# Patient Record
Sex: MALE | Race: WHITE | Employment: OTHER | URBAN - METROPOLITAN AREA
[De-identification: names, ages, dates, MRNs, and addresses within clinical notes are randomized per-mention and may not be internally consistent; named-entity substitution may affect disease eponyms.]

---

## 2017-01-15 DIAGNOSIS — E11.42 PERIPHERAL SENSORY NEUROPATHY DUE TO TYPE 2 DIABETES MELLITUS (HCC): ICD-10-CM

## 2017-01-17 RX ORDER — GABAPENTIN 300 MG/1
CAPSULE ORAL
Qty: 30 CAP | Refills: 0 | Status: SHIPPED | OUTPATIENT
Start: 2017-01-17 | End: 2017-02-14 | Stop reason: SDUPTHER

## 2017-01-24 ENCOUNTER — OFFICE VISIT (OUTPATIENT)
Dept: FAMILY MEDICINE CLINIC | Age: 82
End: 2017-01-24

## 2017-01-24 VITALS
TEMPERATURE: 97.6 F | RESPIRATION RATE: 14 BRPM | DIASTOLIC BLOOD PRESSURE: 61 MMHG | OXYGEN SATURATION: 94 % | HEIGHT: 71 IN | BODY MASS INDEX: 30.1 KG/M2 | WEIGHT: 215 LBS | HEART RATE: 81 BPM | SYSTOLIC BLOOD PRESSURE: 133 MMHG

## 2017-01-24 DIAGNOSIS — N18.30 CKD (CHRONIC KIDNEY DISEASE) STAGE 3, GFR 30-59 ML/MIN (HCC): ICD-10-CM

## 2017-01-24 DIAGNOSIS — Z79.899 ENCOUNTER FOR LONG-TERM (CURRENT) USE OF MEDICATIONS: ICD-10-CM

## 2017-01-24 DIAGNOSIS — E11.42 PERIPHERAL SENSORY NEUROPATHY DUE TO TYPE 2 DIABETES MELLITUS (HCC): ICD-10-CM

## 2017-01-24 RX ORDER — HYDROCODONE BITARTRATE AND ACETAMINOPHEN 5; 325 MG/1; MG/1
1 TABLET ORAL
Qty: 20 TAB | Refills: 0 | Status: SHIPPED | OUTPATIENT
Start: 2017-01-24 | End: 2017-03-22 | Stop reason: SDUPTHER

## 2017-01-24 RX ORDER — INSULIN LISPRO 100 [IU]/ML
INJECTION, SUSPENSION SUBCUTANEOUS
Qty: 10 PEN | Refills: 5 | Status: SHIPPED | OUTPATIENT
Start: 2017-01-24 | End: 2017-04-10 | Stop reason: SDUPTHER

## 2017-01-24 NOTE — PROGRESS NOTES
Chief Complaint   Patient presents with    Referral Follow Up     new diabetic doctor Dr. Yulissa Hinojosa

## 2017-01-24 NOTE — PROGRESS NOTES
Sarah Tobar is a 80 y.o. male, who's a pleasant gentleman. He still works as a night security,    HTN: BP at goal, continue as is. DM2: he haven't been f/u with Dr. Bradley Parsons. His A1C is 11 in September 2016. Very difficult to eat right, [de-identified] yo, unable to do much in ways of exercise. Have not done BG log for a while now. He denies hypoglycemic episode in the last few months, he can tell when his BG get low. He does get down to 130s-160s but this is usually first thing in the mornin after a night work. Currently 70/30 50u am and evening 45u. From limited info he gave me and base on his A1C I'll increase his morning 70/30 to 52. Will refer him to Dr. Mary Hyman instead, who's closer and easier for him to f/u. Diabetic neuropathy bilat feet. Will start gabapentin 300mg QHS. Chronic pain, norco 5/325mg #20 with a refill will last him 4 months. He only takes them seldomly and PRN for pain from his neuropathy. Chronic pedal edema have been prescribed bumex 2mg every day by previous doctor. Followed by Cardiology. Needed refill. Discussed option to use weight as a monitor. He's using his feet edema as indication to use bumex. Reviewed: active problem list, medication list, allergies, family history, social history, notes from last encounter, lab results    A comprehensive review of systems was negative except for that written in the HPI. No Known Allergies  Current Outpatient Prescriptions on File Prior to Visit   Medication Sig Dispense Refill    gabapentin (NEURONTIN) 300 mg capsule TAKE 1 CAPSULE BY MOUTH EVERY NIGHT 30 Cap 0    lisinopril (PRINIVIL, ZESTRIL) 20 mg tablet Take 1 Tab by mouth daily. 90 Tab 1    rosuvastatin (CRESTOR) 40 mg tablet Take 1 Tab by mouth nightly.  90 Tab 1    citalopram (CELEXA) 20 mg tablet TAKE 1 TABLET BY MOUTH EVERY MORNING AS NEEDED FOR ANXIETY DEPPRESSION 90 Tab 1    triamcinolone acetonide (KENALOG) 0.025 % topical cream       bumetanide (BUMEX) 2 mg tablet Take 1 Tab by mouth daily as needed. 90 Tab 1    omeprazole (PRILOSEC) 20 mg capsule TAKE ONE CAPSULE BY MOUTH EVERY DAY 90 Cap 5    Inulin-Sorbitol (FIBER) 2 gram chew Take 2 g by mouth daily. 90 Tab 3    desonide (DESOWEN) 0.05 % topical ointment Apply  to affected area two (2) times a day. 30 g 0    MYRBETRIQ 25 mg ER tablet       VESICARE 10 mg tablet       Blood-Glucose Meter monitoring kit Use as directed 1 Kit 0    albuterol (PROVENTIL HFA, VENTOLIN HFA) 90 mcg/actuation inhaler Take 2 puffs by inhalation every four (4) hours as needed for Wheezing. 1 Inhaler 0    albuterol (ACCUNEB) 1.25 mg/3 mL nebulizer solution Take 3 mL by inhalation every four (4) hours as needed for Wheezing (wheezing). 25 vial 0    tiotropium (SPIRIVA WITH HANDIHALER) 18 mcg inhalation capsule Take 1 Cap by inhalation daily.  glipiZIDE (GLUCOTROL) 5 mg tablet Take  by mouth two (2) times a day.  metolazone (ZAROXOLYN) 5 mg tablet Take 5 mg by mouth daily.  ezetimibe (ZETIA) 10 mg tablet Take 10 mg by mouth daily. No current facility-administered medications on file prior to visit. Patient Active Problem List   Diagnosis Code    Gynecomastia N62    IDDM (insulin dependent diabetes mellitus) ([facility] [address] E11.9, Z79.4    COPD (chronic obstructive pulmonary disease) ([facility]) J44.9    History of chronic CHF Z86.79    Constipation K59.00    CKD (chronic kidney disease) stage 3, GFR 30-59 ml/min N18.3    Uncontrolled type II diabetes mellitus ([facility]) E11.65    Essential hypertension with goal blood pressure less than 140/90 I10    Acute URI J06.9    Mild intermittent asthma with acute exacerbation J45.21       Visit Vitals    /61    Pulse 81    Temp 97.6 °F (36.4 °C) (Oral)    Resp 14    Ht 5' 11\" (1.803 m)    Wt 215 lb (97.5 kg)    SpO2 94%    BMI 29.99 kg/m2     General:  alert, cooperative, no distress, appears stated age, moderately obese.      Eyes:  conjunctivae/corneas clear. PERRL   Neck: supple, symmetrical, trachea midline, no carotid bruit and no JVD   Lung: clear to auscultation bilaterally   Heart:  regular rate and rhythm, S1, S2 normal, no murmur, click, rub or gallop   Abdomen: soft, non-tender. Extremities: extremities normal, atraumatic, no cyanosis or edema   Pulses: 2+ and symmetric   Neuro: normal without focal findings  mental status, speech normal, alert and oriented x iii       Assessment/Plans:    Yaneth Hutchins was seen today for referral follow up. Diagnoses and all orders for this visit:    IDDM (insulin dependent diabetes mellitus) ([facility] [address]  -     REFERRAL TO ENDOCRINOLOGY  -     Insulin Lisp & Lisp Prot, Hum, (HUMALOG 75-25 MIX) 100 unit/mL (75-25) inpn; 52 units in the morning, 45 units in the evening.  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 Encounter for long-term (current) use of medications  -     HEMOGLOBIN A1C W/O EAG  -     MICROALBUMIN, UR, RAND W/ MICROALBUMIN/CREA RATIO  -     METABOLIC PANEL, COMPREHENSIVE    Discussed plans, risk/benefits of treatments/observations. Through the use of shared decision making, above plans were agreed upon. Medication compliance advised. Patient verbalized understanding. Follow-up Disposition:  Return in about 4 months (around 5/24/2017) for chronic care.       Todd Maher MD  1/24/2017

## 2017-01-25 LAB
ALBUMIN SERPL-MCNC: 4 G/DL (ref 3.5–4.7)
ALBUMIN/CREAT UR: 543.6 MG/G CREAT (ref 0–30)
ALBUMIN/GLOB SERPL: 1.5 {RATIO} (ref 1.1–2.5)
ALP SERPL-CCNC: 100 IU/L (ref 39–117)
ALT SERPL-CCNC: 7 IU/L (ref 0–44)
AST SERPL-CCNC: 11 IU/L (ref 0–40)
BILIRUB SERPL-MCNC: 0.5 MG/DL (ref 0–1.2)
BUN SERPL-MCNC: 21 MG/DL (ref 8–27)
BUN/CREAT SERPL: 15 (ref 10–22)
CALCIUM SERPL-MCNC: 9.6 MG/DL (ref 8.6–10.2)
CHLORIDE SERPL-SCNC: 90 MMOL/L (ref 96–106)
CO2 SERPL-SCNC: 25 MMOL/L (ref 18–29)
CREAT SERPL-MCNC: 1.44 MG/DL (ref 0.76–1.27)
CREAT UR-MCNC: 69.5 MG/DL
GLOBULIN SER CALC-MCNC: 2.6 G/DL (ref 1.5–4.5)
GLUCOSE SERPL-MCNC: 403 MG/DL (ref 65–99)
HBA1C MFR BLD: 13.7 % (ref 4.8–5.6)
MICROALBUMIN UR-MCNC: 377.8 UG/ML
POTASSIUM SERPL-SCNC: 5.1 MMOL/L (ref 3.5–5.2)
PROT SERPL-MCNC: 6.6 G/DL (ref 6–8.5)
SODIUM SERPL-SCNC: 135 MMOL/L (ref 134–144)

## 2017-02-14 DIAGNOSIS — E11.42 PERIPHERAL SENSORY NEUROPATHY DUE TO TYPE 2 DIABETES MELLITUS (HCC): ICD-10-CM

## 2017-02-14 RX ORDER — GABAPENTIN 300 MG/1
CAPSULE ORAL
Qty: 90 CAP | Refills: 1 | Status: SHIPPED | OUTPATIENT
Start: 2017-02-14 | End: 2017-08-21 | Stop reason: SDUPTHER

## 2017-02-20 ENCOUNTER — OFFICE VISIT (OUTPATIENT)
Dept: ENDOCRINOLOGY | Age: 82
End: 2017-02-20

## 2017-02-20 VITALS
WEIGHT: 214.6 LBS | HEART RATE: 75 BPM | SYSTOLIC BLOOD PRESSURE: 122 MMHG | DIASTOLIC BLOOD PRESSURE: 68 MMHG | HEIGHT: 71 IN | BODY MASS INDEX: 30.04 KG/M2
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
## 2017-02-21 ENCOUNTER — TELEPHONE (OUTPATIENT)
Dept: FAMILY MEDICINE CLINIC | Age: 82
End: 2017-02-21

## 2017-02-21 RX ORDER — BLOOD SUGAR DIAGNOSTIC
STRIP MISCELLANEOUS
Qty: 150 STRIP | Refills: 10 | Status: SHIPPED | OUTPATIENT
Start: 2017-02-21 | End: 2020-01-01

## 2017-02-21 RX ORDER — INSULIN PUMP SYRINGE, 3 ML
EACH MISCELLANEOUS
Qty: 1 KIT | Refills: 0 | Status: SHIPPED | OUTPATIENT
Start: 2017-02-21 | End: 2020-01-01

## 2017-02-21 NOTE — TELEPHONE ENCOUNTER
----- Message from Dl Guerrero sent at 2/21/2017  1:09 PM EST -----  Regarding: Patient call  Patient is requesting a call back to discuss his meter. He can be reached at [phone]. Thank you.        Nimo Schaeffer

## 2017-02-21 NOTE — TELEPHONE ENCOUNTER
Called and spoke with patient. Patient states that he was seen yesterday 2/20/17 and was told that a new meter and strips would be sent to his pharmacy. Advised patient that Dr. Prosper Blanca sent over a meter and strips to his pharmacy this morning at 8:07 am. Patient verbalized understanding.

## 2017-02-23 RX ORDER — BLOOD-GLUCOSE METER
EACH MISCELLANEOUS
Qty: 1 EACH | Refills: 0 | Status: SHIPPED | OUTPATIENT
Start: 2017-02-23 | End: 2020-01-01

## 2017-03-16 DIAGNOSIS — R60.0 PEDAL EDEMA: ICD-10-CM

## 2017-03-16 RX ORDER — BUMETANIDE 2 MG/1
2 TABLET ORAL
Qty: 90 TAB | Refills: 1 | Status: SHIPPED | OUTPATIENT
Start: 2017-03-16 | End: 2017-09-22 | Stop reason: SDUPTHER

## 2017-03-22 DIAGNOSIS — E11.42 PERIPHERAL SENSORY NEUROPATHY DUE TO TYPE 2 DIABETES MELLITUS (HCC): ICD-10-CM

## 2017-03-22 NOTE — TELEPHONE ENCOUNTER
Pt would like a refill for:   HYDROcodone-acetaminophen (NORCO) 5-325 mg per tablet       Best number to reach him is [phone]

## 2017-03-23 ENCOUNTER — TELEPHONE (OUTPATIENT)
Dept: FAMILY MEDICINE CLINIC | Age: 82
End: 2017-03-23

## 2017-03-23 RX ORDER — HYDROCODONE BITARTRATE AND ACETAMINOPHEN 5; 325 MG/1; MG/1
1 TABLET ORAL
Qty: 20 TAB | Refills: 0 | Status: SHIPPED | OUTPATIENT
Start: 2017-03-23 | End: 2017-04-17 | Stop reason: SDUPTHER

## 2017-03-23 NOTE — TELEPHONE ENCOUNTER
Called pt to tell him his norco is ready for pickup. Ask him to call me back if he wants it faxed to pharmacy.

## 2017-04-10 ENCOUNTER — HOSPITAL ENCOUNTER (EMERGENCY)
Age: 82
Discharge: HOME | End: 2017-04-10
Attending: EMERGENCY MEDICINE | Admitting: EMERGENCY MEDICINE
Payer: MEDICARE

## 2017-04-10 VITALS
HEART RATE: 100 BPM | SYSTOLIC BLOOD PRESSURE: 163 MMHG | HEIGHT: 71 IN | WEIGHT: 228.18 LBS | OXYGEN SATURATION: 98 % | DIASTOLIC BLOOD PRESSURE: 71 MMHG | BODY MASS INDEX: 31.94 KG/M2 | RESPIRATION RATE: 16 BRPM | TEMPERATURE: 97.4 F

## 2017-04-10 DIAGNOSIS — I10 ESSENTIAL HYPERTENSION: ICD-10-CM

## 2017-04-10 DIAGNOSIS — R04.0 RIGHT-SIDED EPISTAXIS: Primary | ICD-10-CM

## 2017-04-10 LAB
BASOPHILS # BLD AUTO: 0 K/UL (ref 0–0.1)
BASOPHILS # BLD: 0 % (ref 0–1)
EOSINOPHIL # BLD: 0.2 K/UL (ref 0–0.4)
EOSINOPHIL NFR BLD: 3 % (ref 0–7)
ERYTHROCYTE [DISTWIDTH] IN BLOOD BY AUTOMATED COUNT: 13.2 % (ref 11.5–14.5)
HCT VFR BLD AUTO: 42.6 % (ref 36.6–50.3)
HGB BLD-MCNC: 14.3 G/DL (ref 12.1–17)
INR PPP: 0.9 (ref 0.9–1.1)
LYMPHOCYTES # BLD AUTO: 18 % (ref 12–49)
LYMPHOCYTES # BLD: 1.2 K/UL (ref 0.8–3.5)
MCH RBC QN AUTO: 31.1 PG (ref 26–34)
MCHC RBC AUTO-ENTMCNC: 33.6 G/DL (ref 30–36.5)
MCV RBC AUTO: 92.6 FL (ref 80–99)
MONOCYTES # BLD: 0.7 K/UL (ref 0–1)
MONOCYTES NFR BLD AUTO: 11 % (ref 5–13)
NEUTS SEG # BLD: 4.5 K/UL (ref 1.8–8)
NEUTS SEG NFR BLD AUTO: 68 % (ref 32–75)
PLATELET # BLD AUTO: 187 K/UL (ref 150–400)
PROTHROMBIN TIME: 9.4 SEC (ref 9–11.1)
RBC # BLD AUTO: 4.6 M/UL (ref 4.1–5.7)
WBC # BLD AUTO: 6.5 K/UL (ref 4.1–11.1)

## 2017-04-10 PROCEDURE — 77030011647 HC PK NSL MEROCL MEDT -A

## 2017-04-10 PROCEDURE — 85610 PROTHROMBIN TIME: CPT | Performed by: EMERGENCY MEDICINE

## 2017-04-10 PROCEDURE — 36415 COLL VENOUS BLD VENIPUNCTURE: CPT | Performed by: EMERGENCY MEDICINE

## 2017-04-10 PROCEDURE — 75810000284 HC CNTRL NASAL HEMORHRAGE SIMPLE

## 2017-04-10 PROCEDURE — 99282 EMERGENCY DEPT VISIT SF MDM: CPT

## 2017-04-10 PROCEDURE — 85025 COMPLETE CBC W/AUTO DIFF WBC: CPT | Performed by: EMERGENCY MEDICINE

## 2017-04-10 PROCEDURE — 74011250637 HC RX REV CODE- 250/637: Performed by: EMERGENCY MEDICINE

## 2017-04-10 RX ORDER — INSULIN LISPRO 100 [IU]/ML
INJECTION, SUSPENSION SUBCUTANEOUS
Qty: 10 PEN | Refills: 5 | Status: SHIPPED | OUTPATIENT
Start: 2017-04-10 | End: 2017-08-02 | Stop reason: SDUPTHER

## 2017-04-10 RX ORDER — OXYMETAZOLINE HCL 0.05 %
2 SPRAY, NON-AEROSOL (ML) NASAL
Status: COMPLETED | OUTPATIENT
Start: 2017-04-10 | End: 2017-04-10

## 2017-04-10 RX ADMIN — OXYMETAZOLINE HYDROCHLORIDE 2 SPRAY: 5 SPRAY NASAL at 07:46
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
## 2017-04-10 NOTE — DISCHARGE INSTRUCTIONS
KEEP NOSE PACKING IN PLACE. GO TO YOUR PLANNED APPOINTMENT WITH DR. Gita Barboza ON Thursday April 13TH AT 10AM. PLEASE ARRIVE AT 9:45AM.    RETURN FOR RECURRENT BLEEDING, FEVER, DIFFICULTY BREATHING OR OTHER NEW CONCERNING SYMPTOMS. FOLLOW-UP WITH YOUR REGULAR DOCTOR ABOUT YOUR ELEVATED BLOOD PRESSURE. High Blood Pressure: Care Instructions  Your Care Instructions  If your blood pressure is usually above 140/90, you have high blood pressure, or hypertension. That means the top number is 140 or higher or the bottom number is 90 or higher, or both. Despite what a lot of people think, high blood pressure usually doesn't cause headaches or make you feel dizzy or lightheaded. It usually has no symptoms. But it does increase your risk for heart attack, stroke, and kidney or eye damage. The higher your blood pressure, the more your risk increases. Your doctor will give you a goal for your blood pressure. Your goal will be based on your health and your age. An example of a goal is to keep your blood pressure below 140/90. Lifestyle changes, such as eating healthy and being active, are always important to help lower blood pressure. You might also take medicine to reach your blood pressure goal.  Follow-up care is a key part of your treatment and safety. Be sure to make and go to all appointments, and call your doctor if you are having problems. It's also a good idea to know your test results and keep a list of the medicines you take. How can you care for yourself at home? Medical treatment  · If you stop taking your medicine, your blood pressure will go back up. You may take one or more types of medicine to lower your blood pressure. Be safe with medicines. Take your medicine exactly as prescribed. Call your doctor if you think you are having a problem with your medicine. · Talk to your doctor before you start taking aspirin every day.  Aspirin can help certain people lower their risk of a heart attack or stroke. But taking aspirin isn't right for everyone, because it can cause serious bleeding. · See your doctor regularly. You may need to see the doctor more often at first or until your blood pressure comes down. · If you are taking blood pressure medicine, talk to your doctor before you take decongestants or anti-inflammatory medicine, such as ibuprofen. Some of these medicines can raise blood pressure. · Learn how to check your blood pressure at home. Lifestyle changes  · Stay at a healthy weight. This is especially important if you put on weight around the waist. Losing even 10 pounds can help you lower your blood pressure. · If your doctor recommends it, get more exercise. Walking is a good choice. Bit by bit, increase the amount you walk every day. Try for at least 30 minutes on most days of the week. You also may want to swim, bike, or do other activities. · Avoid or limit alcohol. Talk to your doctor about whether you can drink any alcohol. · Try to limit how much sodium you eat to less than 2,300 milligrams (mg) a day. Your doctor may ask you to try to eat less than 1,500 mg a day. · Eat plenty of fruits (such as bananas and oranges), vegetables, legumes, whole grains, and low-fat dairy products. · Lower the amount of saturated fat in your diet. Saturated fat is found in animal products such as milk, cheese, and meat. Limiting these foods may help you lose weight and also lower your risk for heart disease. · Do not smoke. Smoking increases your risk for heart attack and stroke. If you need help quitting, talk to your doctor about stop-smoking programs and medicines. These can increase your chances of quitting for good. When should you call for help? Call 911 anytime you think you may need emergency care. This may mean having symptoms that suggest that your blood pressure is causing a serious heart or blood vessel problem. Your blood pressure may be over 180/110.   For example, call 911 if:  · You have symptoms of a heart attack. These may include:  ¨ Chest pain or pressure, or a strange feeling in the chest.  ¨ Sweating. ¨ Shortness of breath. ¨ Nausea or vomiting. ¨ Pain, pressure, or a strange feeling in the back, neck, jaw, or upper belly or in one or both shoulders or arms. ¨ Lightheadedness or sudden weakness. ¨ A fast or irregular heartbeat. · You have symptoms of a stroke. These may include:  ¨ Sudden numbness, tingling, weakness, or loss of movement in your face, arm, or leg, especially on only one side of your body. ¨ Sudden vision changes. ¨ Sudden trouble speaking. ¨ Sudden confusion or trouble understanding simple statements. ¨ Sudden problems with walking or balance. ¨ A sudden, severe headache that is different from past headaches. · You have severe back or belly pain. Do not wait until your blood pressure comes down on its own. Get help right away. Call your doctor now or seek immediate care if:  · Your blood pressure is much higher than normal (such as 180/110 or higher), but you don't have symptoms. · You think high blood pressure is causing symptoms, such as:  ¨ Severe headache. ¨ Blurry vision. Watch closely for changes in your health, and be sure to contact your doctor if:  · Your blood pressure measures 140/90 or higher at least 2 times. That means the top number is 140 or higher or the bottom number is 90 or higher, or both. · You think you may be having side effects from your blood pressure medicine. · Your blood pressure is usually normal, but it goes above normal at least 2 times. Where can you learn more? Go to http://agnes-ca.info/. Enter J012 in the search box to learn more about \"High Blood Pressure: Care Instructions. \"  Current as of: August 8, 2016  Content Version: 11.2  © 9980-6131 [employer].  Care instructions adapted under license by [employer] (which disclaims liability or warranty for this information). If you have questions about a medical condition or this instruction, always ask your healthcare professional. [address] any warranty or liability for your use of this information. Nosebleeds: Care Instructions  Your Care Instructions    Nosebleeds are common, especially if you have colds or allergies. Many things can cause a nosebleed. Some nosebleeds stop on their own with pressure. Others need packing. Some get cauterized (sealed). If you have gauze or other packing materials in your nose, you will need to follow up with your doctor to have the packing removed. You may need more treatment if you get nosebleeds a lot. The doctor has checked you carefully, but problems can develop later. If you notice any problems or new symptoms, get medical treatment right away. Follow-up care is a key part of your treatment and safety. Be sure to make and go to all appointments, and call your doctor if you are having problems. It's also a good idea to know your test results and keep a list of the medicines you take. How can you care for yourself at home? · If you get another nosebleed:  ¨ Sit up and tilt your head slightly forward. This keeps blood from going down your throat. ¨ Use your thumb and index finger to pinch your nose shut for 10 minutes. Use a clock. Do not check to see if the bleeding has stopped before the 10 minutes are up. If the bleeding has not stopped, pinch your nose shut for another 10 minutes. ¨ When the bleeding has stopped, try not to pick, rub, or blow your nose for 12 hours. Avoiding these things helps keep your nose from bleeding again. · If your doctor prescribed antibiotics, take them as directed. Do not stop taking them just because you feel better. You need to take the full course of antibiotics. To prevent nosebleeds  · Do not blow your nose too hard. · Try not to lift or strain after a nosebleed.   · Raise your head on a pillow while you sleep.  · Put a thin layer of a saline- or water-based nasal gel, such as NasoGel, inside your nose. Put it on the septum, which divides your nostrils. This will prevent dryness that can cause nosebleeds. · Use a vaporizer or humidifier to add moisture to your bedroom. Follow the directions for cleaning the machine. · Do not use aspirin, ibuprofen (Advil, Motrin), or naproxen (Aleve) for 36 to 48 hours after a nosebleed unless your doctor tells you to. You can use acetaminophen (Tylenol) for pain relief. · Talk to your doctor about stopping any other medicines you are taking. Some medicines may make you more likely to get a nosebleed. · Do not use cold medicines or nasal sprays without first talking to your doctor. They can make your nose dry. When should you call for help? Call 911 anytime you think you may need emergency care. For example, call if:  · You passed out (lost consciousness). Call your doctor now or seek immediate medical care if:  · You get another nosebleed and your nose is still bleeding after you have applied pressure 3 times for 10 minutes each time (30 minutes total). · There is a lot of blood running down the back of your throat even after you pinch your nose and tilt your head forward. · You have a fever. · You have sinus pain. Watch closely for changes in your health, and be sure to contact your doctor if:  · You get nosebleeds often, even if they stop. · You do not get better as expected. Where can you learn more? Go to http://agnes-ca.info/. Enter S156 in the search box to learn more about \"Nosebleeds: Care Instructions. \"  Current as of: May 27, 2016  Content Version: 11.2  © 4031-5001 [employer]. Care instructions adapted under license by American Board of Addiction Medicine (ABAM) (which disclaims liability or warranty for this information).  If you have questions about a medical condition or this instruction, always ask your healthcare professional. [employer] disclaims any warranty or liability for your use of this information.

## 2017-04-10 NOTE — ED NOTES
Dr Mila Sandra bedside evaluating pt. ENT cart set up bedside along with nasal packing tray. Pt complains of nose bleed x 1.5 hours Pt reports hx of nose bleeds as child Pt states he noted headache x this am and reported feeling his BP rise. Pt denies any other complaints. Pt alert and oriented x 4 Skin warm dry intact.

## 2017-04-10 NOTE — ED NOTES
Pt discharged by Dr Na Jon. Pt provided with discharge instructions Rx and instructions on follow up care. Pt out of ED under own power with steady gait accompanied by self.

## 2017-04-10 NOTE — TELEPHONE ENCOUNTER
Patient called and asked for a prescription be called into his pharmacy please for [facility]. Patient can be reached at:  [phone].     Gina   [phone]  [facility]

## 2017-04-17 ENCOUNTER — OFFICE VISIT (OUTPATIENT)
Dept: ENDOCRINOLOGY | Age: 82
End: 2017-04-17

## 2017-04-17 ENCOUNTER — OFFICE VISIT (OUTPATIENT)
Dept: FAMILY MEDICINE CLINIC | Age: 82
End: 2017-04-17

## 2017-04-17 VITALS
TEMPERATURE: 97.7 F | DIASTOLIC BLOOD PRESSURE: 69 MMHG | SYSTOLIC BLOOD PRESSURE: 153 MMHG | OXYGEN SATURATION: 94 % | HEART RATE: 88 BPM | HEIGHT: 71 IN | BODY MASS INDEX: 31.22 KG/M2 | RESPIRATION RATE: 16 BRPM | WEIGHT: 223 LBS

## 2017-04-17 DIAGNOSIS — R04.0 BLEEDING NOSE: Primary | ICD-10-CM

## 2017-04-17 DIAGNOSIS — N17.9 AKI (ACUTE KIDNEY INJURY) (HCC): ICD-10-CM

## 2017-04-17 DIAGNOSIS — E11.42 PERIPHERAL SENSORY NEUROPATHY DUE TO TYPE 2 DIABETES MELLITUS (HCC): ICD-10-CM

## 2017-04-17 RX ORDER — HYDROCODONE BITARTRATE AND ACETAMINOPHEN 5; 325 MG/1; MG/1
1 TABLET ORAL
Qty: 20 TAB | Refills: 0 | Status: SHIPPED | OUTPATIENT
Start: 2017-04-17 | End: 2017-05-25 | Stop reason: SDUPTHER

## 2017-04-17 NOTE — MR AVS SNAPSHOT
Visit Information Date & Time Provider Department Dept. Phone Encounter #  
 4/17/2017 12:00 PM Baudilio Price MD [facility] at [address] [id number] Follow-up Instructions Return in about 3 months (around 7/17/2017) for annual exam.  
  
Your Appointments 5/1/2017  8:50 AM  
Follow Up with Scooter Koenig MD  
[facility] [address] [facility]) Appt Note: f/u           dm       morgan from        4/17/2017  
 [address] [facility] [address] [phone] [address]  
  
    
 5/25/2017  8:45 AM  
ROUTINE CARE with Baudilio Price MD  
[facility] at [facility] [address]) Appt Note: 4m fu; 4m fu  
 [facility] [address] [phone]  
[facility] Upcoming Health Maintenance Date Due  
 FOOT EXAM Q1 9/20/1945 EYE EXAM RETINAL OR DILATED Q1 9/20/1945 DTaP/Tdap/Td series (1 - Tdap) 9/20/1956 ZOSTER VACCINE AGE 60> 9/20/1995 MEDICARE YEARLY EXAM 9/20/2000 LIPID PANEL Q1 5/13/2017 HEMOGLOBIN A1C Q6M 7/24/2017 MICROALBUMIN Q1 1/24/2018 GLAUCOMA SCREENING Q2Y 3/16/2018 Allergies as of 4/17/2017  Review Complete On: 4/17/2017 By: Baudilio Price MD  
 No Known Allergies Current Immunizations  Reviewed on 5/4/2016 Name Date Influenza High Dose Vaccine PF 9/14/2016 Influenza Vaccine Whole 9/28/2010 Pneumococcal Vaccine (Unspecified Type) 11/28/2010  9:00 PM  
  
 Not reviewed this visit You Were Diagnosed With   
  
 Codes Comments Bleeding nose    -  Primary ICD-10-CM: R04.0 ICD-9-CM: 500. 7 Vitals BP Pulse Temp Resp Height(growth percentile) Weight(growth percentile) 153/69 88 97.7 °F (36.5 °C) (Oral) 16 5' 11\" (1.803 m) 223 lb (101.2 kg) SpO2 BMI Smoking Status 94% 31.1 kg/m2 Former Smoker Vitals History BMI and BSA Data Body Mass Index Body Surface Area  
 31.1 kg/m 2 2.25 m 2 Preferred Pharmacy Pharmacy Name Phone [facility] [address] [place] [address], [address] [place] [address] Drive [phone] Your Updated Medication List  
  
   
This list is accurate as of: 4/17/17 12:45 PM.  Always use your most recent med list.  
  
  
  
  
 * albuterol 90 mcg/actuation inhaler Commonly known as:  PROVENTIL HFA, VENTOLIN HFA, PROAIR HFA Take 2 puffs by inhalation every four (4) hours as needed for Wheezing. * albuterol 1.25 mg/3 mL Nebu Commonly known as:  Randolph Levy Take 3 mL by inhalation every four (4) hours as needed for Wheezing (wheezing). * Ascensia CONTOUR strip Generic drug:  glucose blood VI test strips MONITOR BLOOD SUGAR TWICE DAILY * glucose blood VI test strips strip Commonly known as:  ACCU-CHEK AGATHA Monitor blood sugar twice daily. ICD 10 Code: E11.65 * Blood-Glucose Meter monitoring kit Commonly known as:  CONTOUR METER Monitor BS twice daily * Blood-Glucose Meter Misc Commonly known as:  ACCU-CHEK AGATHA PLUS METER Monitor blood sugar twice daily. ICD 10 Code: E11.65  
  
 bumetanide 2 mg tablet Commonly known as:  Monique [place] Take 1 Tab by mouth daily as needed. desonide 0.05 % topical ointment Commonly known as:  Sergio Canon Apply  to affected area two (2) times a day.  
  
 gabapentin 300 mg capsule Commonly known as:  NEURONTIN  
TAKE 1 CAPSULE BY MOUTH EVERY NIGHT  
  
 glipiZIDE 5 mg tablet Commonly known as:  Janine  Take  by mouth two (2) times a day. HYDROcodone-acetaminophen 5-325 mg per tablet Commonly known as:  Yenny Rook Take 1 Tab by mouth daily as needed for Pain. Insulin Lisp & Lisp Prot (Hum) 100 unit/mL (75-25) Inpn Commonly known as:  HUMALOG 75-25 MIX 52 units in the morning, 45 units in the evening. Inulin-Sorbitol 2 gram Chew Commonly known as:  Fiber Take 2 g by mouth daily. lisinopril 20 mg tablet Commonly known as:  Barbarann Plunk Take 1 Tab by mouth daily. metOLazone 5 mg tablet Commonly known as:  Loetta Epp Take 5 mg by mouth daily. MYRBETRIQ 25 mg ER tablet Generic drug:  mirabegron ER Take 25 mg by mouth daily. omeprazole 20 mg capsule Commonly known as:  PRILOSEC  
TAKE ONE CAPSULE BY MOUTH EVERY DAY  
  
 rosuvastatin 40 mg tablet Commonly known as:  CRESTOR Take 1 Tab by mouth nightly. SPIRIVA WITH HANDIHALER 18 mcg inhalation capsule Generic drug:  tiotropium Take 1 Cap by inhalation daily. triamcinolone acetonide 0.025 % topical cream  
Commonly known as:  KENALOG  
  
 VESIcare 10 mg tablet Generic drug:  solifenacin Take 10 mg by mouth daily. ZETIA 10 mg tablet Generic drug:  ezetimibe Take 10 mg by mouth daily. * Notice: This list has 6 medication(s) that are the same as other medications prescribed for you. Read the directions carefully, and ask your doctor or other care provider to review them with you. Follow-up Instructions Return in about 3 months (around 7/17/2017) for annual exam.  
  
  
Introducing [facility]! [facility] introduces [facility] patient portal. Now you can access parts of your medical record, email your doctor's office, and request medication refills online. 1. In your internet browser, go to [email or web address] 2. Click on the First Time User? Click Here link in the Sign In box. You will see the New Member Sign Up page. 3. Enter your [facility] Access Code exactly as it appears below. You will not need to use this code after youve completed the sign-up process. If you do not sign up before the expiration date, you must request a new code. · [facility] Access Code: [email or web address] Expires: 4/24/2017  3:29 PM 
 
4. Enter the last four digits of your Social Security Number (xxxx) and Date of Birth (mm/dd/yyyy) as indicated and click Submit. You will be taken to the next sign-up page. 5. Create a Coub ID. This will be your Coub login ID and cannot be changed, so think of one that is secure and easy to remember. 6. Create a Coub password. You can change your password at any time. 7. Enter your Password Reset Question and Answer. This can be used at a later time if you forget your password. 8. Enter your e-mail address. You will receive e-mail notification when new information is available in [address] 9. Click Sign Up. You can now view and download portions of your medical record. 10. Click the Download Summary menu link to download a portable copy of your medical information. If you have questions, please visit the Frequently Asked Questions section of the Coub website. Remember, [facility]b is NOT to be used for urgent needs. For medical emergencies, dial 911. Now available from your iPhone and Android! Please provide this summary of care documentation to your next provider. Your primary care clinician is listed as Morales Osborne. If you have any questions after today's visit, please call [phone].

## 2017-04-17 NOTE — PROGRESS NOTES
Ketty Blanc is a 80 y.o. male, who's a pleasant gentleman. He still works as a night security,    [employer]. Last week had nosebleed \"alot\". It was packed at ED. There [employer], will recheck today. Hx of CKD stage 3B. HTN: BP at goal, continue as is. DM2: he now sees Dr. Elizabeth Frey, was there this morning, but Dr. Elizabeth Frey came in office late and he couldn't wait. He report 3 episodes of hypoglycemic b/c he misses meal when come home from night shift. I advice to try not miss meal or drink his wife's ensure or it's OK to cut down his 70/30 by half the morning before going to bed. Chronic pain and Diabetic neuropathy bilat feet, norco 5/325mg #20 with a refill will last him 4 months. He only takes them seldomly and PRN for pain from his neuropathy. Reviewed: active problem list, medication list, allergies, family history, social history, notes from last encounter, lab results    A comprehensive review of systems was negative except for that written in the HPI. No Known Allergies  Current Outpatient Prescriptions on File Prior to Visit   Medication Sig Dispense Refill    Insulin Lisp & Lisp Prot, Hum, (HUMALOG 75-25 MIX) 100 unit/mL (75-25) inpn 52 units in the morning, 45 units in the evening. 10 Pen 5    Blood-Glucose Meter (ACCU-CHEK AGATHA PLUS METER) misc Monitor blood sugar twice daily. ICD 10 Code: E11.65 1 Each 0    glucose blood VI test strips (ACCU-CHEK AGATHA) strip Monitor blood sugar twice daily. ICD 10 Code: E11.65 200 Strip 3    Blood-Glucose Meter (CONTOUR METER) monitoring kit Monitor BS twice daily 1 Kit 0    ASCENSIA CONTOUR strip MONITOR BLOOD SUGAR TWICE DAILY 150 Strip 10    gabapentin (NEURONTIN) 300 mg capsule TAKE 1 CAPSULE BY MOUTH EVERY NIGHT 90 Cap 1    lisinopril (PRINIVIL, ZESTRIL) 20 mg tablet Take 1 Tab by mouth daily. 90 Tab 1    rosuvastatin (CRESTOR) 40 mg tablet Take 1 Tab by mouth nightly.  90 Tab 1    triamcinolone acetonide (KENALOG) 0.025 % topical cream       omeprazole (PRILOSEC) 20 mg capsule TAKE ONE CAPSULE BY MOUTH EVERY DAY 90 Cap 5    VESICARE 10 mg tablet Take 10 mg by mouth daily.  albuterol (PROVENTIL HFA, VENTOLIN HFA) 90 mcg/actuation inhaler Take 2 puffs by inhalation every four (4) hours as needed for Wheezing. 1 Inhaler 0    albuterol (ACCUNEB) 1.25 mg/3 mL nebulizer solution Take 3 mL by inhalation every four (4) hours as needed for Wheezing (wheezing). 25 vial 0    glipiZIDE (GLUCOTROL) 5 mg tablet Take  by mouth two (2) times a day.  metolazone (ZAROXOLYN) 5 mg tablet Take 5 mg by mouth daily.  ezetimibe (ZETIA) 10 mg tablet Take 10 mg by mouth daily.  bumetanide (BUMEX) 2 mg tablet Take 1 Tab by mouth daily as needed. 90 Tab 1    Inulin-Sorbitol (FIBER) 2 gram chew Take 2 g by mouth daily. 90 Tab 3    desonide (DESOWEN) 0.05 % topical ointment Apply  to affected area two (2) times a day. 30 g 0    MYRBETRIQ 25 mg ER tablet Take 25 mg by mouth daily.  tiotropium (SPIRIVA WITH HANDIHALER) 18 mcg inhalation capsule Take 1 Cap by inhalation daily. No current facility-administered medications on file prior to visit. Patient Active Problem List   Diagnosis Code    Gynecomastia N62    IDDM (insulin dependent diabetes mellitus) ([facility] [address] E11.9, Z79.4    COPD (chronic obstructive pulmonary disease) ([facility]) J44.9    History of chronic CHF Z86.79    Constipation K59.00    CKD (chronic kidney disease) stage 3, GFR 30-59 ml/min N18.3    Uncontrolled type II diabetes mellitus ([facility]) E11.65    Essential hypertension with goal blood pressure less than 140/90 I10    Acute URI J06.9    Mild intermittent asthma with acute exacerbation J45.21       Visit Vitals    /69    Pulse 88    Temp 97.7 °F (36.5 °C) (Oral)    Resp 16    Ht 5' 11\" (1.803 m)    Wt 223 lb (101.2 kg)    SpO2 94%    BMI 31.1 kg/m2     General:  alert, cooperative, no distress, appears stated age, moderately obese.      Eyes: conjunctivae/corneas clear. PERRL   Neck: supple, symmetrical, trachea midline, no carotid bruit and no JVD   Lung: clear to auscultation bilaterally   Heart:  regular rate and rhythm, S1, S2 normal, no murmur, click, rub or gallop   Abdomen: soft, non-tender. Extremities: extremities normal, atraumatic, no cyanosis or edema   Pulses: 2+ and symmetric   Neuro: normal without focal findings  mental status, speech normal, alert and oriented x iii       Assessment/Plans:    Zayra Mello was seen today for bleeding/bruising. 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 Discussed plans, risk/benefits of treatments/observations. Through the use of shared decision making, above plans were agreed upon. Medication compliance advised. Patient verbalized understanding.      Follow-up Disposition:  Return in about 3 months (around 7/17/2017) for annual exam.      Pebbles Horton MD  4/17/2017

## 2017-04-17 NOTE — PROGRESS NOTES
Chief Complaint   Patient presents with    Bleeding/Bruising     nose bleeding went to ED at [facility] packed nose BP elevated   wants refill of norco.

## 2017-04-18 LAB
BUN SERPL-MCNC: 25 MG/DL (ref 8–27)
BUN/CREAT SERPL: 18 (ref 10–24)
CALCIUM SERPL-MCNC: 9 MG/DL (ref 8.6–10.2)
CHLORIDE SERPL-SCNC: 93 MMOL/L (ref 96–106)
CO2 SERPL-SCNC: 28 MMOL/L (ref 18–29)
CREAT SERPL-MCNC: 1.38 MG/DL (ref 0.76–1.27)
GLUCOSE SERPL-MCNC: 375 MG/DL (ref 65–99)
INTERPRETATION: NORMAL
POTASSIUM SERPL-SCNC: 4.7 MMOL/L (ref 3.5–5.2)
SODIUM SERPL-SCNC: 136 MMOL/L (ref 134–144)

## 2017-05-03 ENCOUNTER — TELEPHONE (OUTPATIENT)
Dept: FAMILY MEDICINE CLINIC | Age: 82
End: 2017-05-03

## 2017-05-25 ENCOUNTER — OFFICE VISIT (OUTPATIENT)
Dept: FAMILY MEDICINE CLINIC | Age: 82
End: 2017-05-25

## 2017-05-25 VITALS
RESPIRATION RATE: 18 BRPM | TEMPERATURE: 95.6 F | SYSTOLIC BLOOD PRESSURE: 148 MMHG | HEIGHT: 71 IN | BODY MASS INDEX: 30.3 KG/M2 | WEIGHT: 216.4 LBS | OXYGEN SATURATION: 94 % | HEART RATE: 92 BPM | DIASTOLIC BLOOD PRESSURE: 88 MMHG

## 2017-05-25 DIAGNOSIS — Z12.5 PROSTATE CANCER SCREENING: ICD-10-CM

## 2017-05-25 DIAGNOSIS — Z79.899 ENCOUNTER FOR LONG-TERM (CURRENT) USE OF MEDICATIONS: ICD-10-CM

## 2017-05-25 DIAGNOSIS — J41.8 MIXED SIMPLE AND MUCOPURULENT CHRONIC BRONCHITIS (HCC): ICD-10-CM

## 2017-05-25 DIAGNOSIS — E55.9 VITAMIN D DEFICIENCY: ICD-10-CM

## 2017-05-25 DIAGNOSIS — Z12.11 COLON CANCER SCREENING: ICD-10-CM

## 2017-05-25 DIAGNOSIS — Z71.89 ACP (ADVANCE CARE PLANNING): ICD-10-CM

## 2017-05-25 DIAGNOSIS — N18.30 CKD (CHRONIC KIDNEY DISEASE) STAGE 3, GFR 30-59 ML/MIN (HCC): ICD-10-CM

## 2017-05-25 DIAGNOSIS — Z79.899 ENCOUNTER FOR LONG-TERM (CURRENT) USE OF OTHER MEDICATIONS: ICD-10-CM

## 2017-05-25 DIAGNOSIS — N40.1 BENIGN PROSTATIC HYPERPLASIA WITH WEAK URINARY STREAM: ICD-10-CM

## 2017-05-25 DIAGNOSIS — R39.12 BENIGN PROSTATIC HYPERPLASIA WITH WEAK URINARY STREAM: ICD-10-CM

## 2017-05-25 DIAGNOSIS — E78.5 HYPERLIPIDEMIA, UNSPECIFIED HYPERLIPIDEMIA TYPE: ICD-10-CM

## 2017-05-25 DIAGNOSIS — Z00.00 MEDICARE ANNUAL WELLNESS VISIT, SUBSEQUENT: Primary | ICD-10-CM

## 2017-05-25 DIAGNOSIS — I10 ESSENTIAL HYPERTENSION WITH GOAL BLOOD PRESSURE LESS THAN 140/90: ICD-10-CM

## 2017-05-25 DIAGNOSIS — E11.42 PERIPHERAL SENSORY NEUROPATHY DUE TO TYPE 2 DIABETES MELLITUS (HCC): ICD-10-CM

## 2017-05-25 RX ORDER — GUAIFENESIN 100 MG/5ML
81 LIQUID (ML) ORAL DAILY
COMMUNITY
End: 2019-01-01 | Stop reason: CLARIF

## 2017-05-25 RX ORDER — HYDROCODONE BITARTRATE AND ACETAMINOPHEN 5; 325 MG/1; MG/1
1 TABLET ORAL
Qty: 20 TAB | Refills: 0 | Status: SHIPPED | OUTPATIENT
Start: 2017-05-25 | End: 2017-06-26 | Stop reason: SDUPTHER

## 2017-05-25 NOTE — MR AVS SNAPSHOT
Visit Information Date & Time Provider Department Dept. Phone Encounter #  
 5/25/2017  8:45 AM Zia Carter MD [facility] at [address] [id number] Follow-up Instructions Return in about 4 months (around 9/25/2017) for chronic care. Your Appointments 7/18/2017 10:15 AM  
COMPLETE PHYSICAL with Zia Carter MD  
[facility] at [facility]) Appt Note: [phone]  
[facility] Upcoming Health Maintenance Date Due  
 FOOT EXAM Q1 9/20/1945 EYE EXAM RETINAL OR DILATED Q1 9/20/1945 DTaP/Tdap/Td series (1 - Tdap) 9/20/1956 ZOSTER VACCINE AGE 60> 9/20/1995 HEMOGLOBIN A1C Q6M 7/24/2017 INFLUENZA AGE 9 TO ADULT 8/1/2017 MICROALBUMIN Q1 1/24/2018 GLAUCOMA SCREENING Q2Y 3/16/2018 LIPID PANEL Q1 5/25/2018 MEDICARE YEARLY EXAM 5/26/2018 Allergies as of 5/25/2017  Review Complete On: 5/25/2017 By: Zia Carter MD  
 No Known Allergies Current Immunizations  Reviewed on 5/4/2016 Name Date Influenza High Dose Vaccine PF 9/14/2016 Influenza Vaccine Whole 9/28/2010 Pneumococcal Vaccine (Unspecified Type) 11/28/2010  9:00 PM  
  
 Not reviewed this visit You Were Diagnosed With   
  
 Codes Comments Medicare annual wellness visit, subsequent    -  Primary ICD-10-CM: Z00.00 ICD-9-CM: V70.0 Uncontrolled type 2 diabetes mellitus with diabetic nephropathy, with long-term current use of insulin (HCC)     ICD-10-CM: E11.21, E11.65, Z79.4 ICD-9-CM: 250.42, 583.81, V58.67 CKD (chronic kidney disease) stage 3, GFR 30-59 ml/min     ICD-10-CM: N18.3 ICD-9-CM: 967. 3 Essential hypertension with goal blood pressure less than 140/90     ICD-10-CM: I10 
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 
ICD-9-CM: 250.60, 356.2 ACP (advance care planning)     ICD-10-CM: Z71.89 ICD-9-CM: V65.49 Vitals BP Pulse Temp Resp Height(growth percentile) Weight(growth percentile) 148/88 (BP 1 Location: Left arm, BP Patient Position: Sitting) 92 95.6 °F (35.3 °C) (Oral) 18 5' 11\" (1.803 m) 216 lb 6.4 oz (98.2 kg) SpO2 BMI Smoking Status 94% 30.18 kg/m2 Former Smoker Vitals History BMI and BSA Data Body Mass Index Body Surface Area  
 30.18 kg/m 2 2.22 m 2 Preferred Pharmacy Pharmacy Name Phone [facility] [address] [phone] Your Updated Medication List  
  
   
This list is accurate as of: 5/25/17  9:30 AM.  Always use your most recent med list.  
  
  
  
  
 * albuterol 90 mcg/actuation inhaler Commonly known as:  PROVENTIL HFA, VENTOLIN HFA, PROAIR HFA Take 2 puffs by inhalation every four (4) hours as needed for Wheezing. * albuterol 1.25 mg/3 mL Nebu Commonly known as:  Dell Euler Take 3 mL by inhalation every four (4) hours as needed for Wheezing (wheezing). * [employer] CONTOUR strip Generic drug:  glucose blood VI test strips MONITOR BLOOD SUGAR TWICE DAILY * glucose blood VI test strips strip Commonly known as:  ACCU-CHEK AGATHA Monitor blood sugar twice daily. ICD 10 Code: E11.65  
  
 aspirin 81 mg chewable tablet Take 81 mg by mouth daily. * Blood-Glucose Meter monitoring kit Commonly known as:  CONTOUR METER Monitor BS twice daily * Blood-Glucose Meter Misc Commonly known as:  ACCU-CHEK AGATHA PLUS METER Monitor blood sugar twice daily. ICD 10 Code: E11.65  
  
 bumetanide 2 mg tablet Commonly known as:  More Abide Take 1 Tab by mouth daily as needed. citalopram 20 mg tablet Commonly known as:  Jermain Moody TAKE 1 TABLET BY MOUTH EVERY MORNING AS NEEDED FOR ANXIETY DEPPRESSION  
  
 desonide 0.05 % topical ointment Commonly known as:  Julia Labrum Apply  to affected area two (2) times a day. FISH -160-1,000 mg Cap Generic drug:  omega 3-dha-epa-fish oil Take  by mouth.  
  
 gabapentin 300 mg capsule Commonly known as:  NEURONTIN  
TAKE 1 CAPSULE BY MOUTH EVERY NIGHT  
  
 glipiZIDE 5 mg tablet Commonly known as:  Merla Yasmeen Take  by mouth two (2) times a day. HYDROcodone-acetaminophen 5-325 mg per tablet Commonly known as:  Gillie Breed Take 1 Tab by mouth daily as needed for Pain. Insulin Lisp & Lisp Prot (Hum) 100 unit/mL (75-25) Inpn Commonly known as:  HUMALOG 75-25 MIX 52 units in the morning, 45 units in the evening. Inulin-Sorbitol 2 gram Chew Commonly known as:  Fiber Take 2 g by mouth daily. lisinopril 20 mg tablet Commonly known as:  Alex Leaver Take 1 Tab by mouth daily. metOLazone 5 mg tablet Commonly known as:  Norina Alamin Take 5 mg by mouth daily. MYRBETRIQ 25 mg ER tablet Generic drug:  mirabegron ER Take 25 mg by mouth daily. omeprazole 20 mg capsule Commonly known as:  PRILOSEC  
TAKE ONE CAPSULE BY MOUTH EVERY DAY  
  
 rosuvastatin 40 mg tablet Commonly known as:  CRESTOR Take 1 Tab by mouth nightly. SPIRIVA WITH HANDIHALER 18 mcg inhalation capsule Generic drug:  tiotropium Take 1 Cap by inhalation daily. triamcinolone acetonide 0.025 % topical cream  
Commonly known as:  KENALOG  
  
 VESIcare 10 mg tablet Generic drug:  solifenacin Take 10 mg by mouth daily. ZETIA 10 mg tablet Generic drug:  ezetimibe Take 10 mg by mouth daily. * Notice: This list has 6 medication(s) that are the same as other medications prescribed for you. Read the directions carefully, and ask your doctor or other care provider to review them with you. Prescriptions Printed Refills HYDROcodone-acetaminophen (NORCO) 5-325 mg per tablet 0 Sig: Take 1 Tab by mouth daily as needed for Pain. Class: Print Route: Oral  
  
We Performed the Following HEMOGLOBIN A1C W/O EAG [79510 CPT(R)] LIPID PANEL [29524 CPT(R)] MICROALBUMIN, UR, RAND W/ MICROALBUMIN/CREA RATIO [id number] CPT(R)] PSA W/ REFLX FREE PSA [40667 CPT(R)] REFERRAL TO GASTROENTEROLOGY [FBN84 Custom] Comments:  
 Please evaluate patient for colonoscopy evaluation. TSH RFX ON ABNORMAL TO FREE T4 [[id number] Custom] VITAMIN D, 25 HYDROXY [id number] CPT(R)] Follow-up Instructions Return in about 4 months (around 9/25/2017) for chronic care. Referral Information Referral ID Referred By Referred To  
  
 [id number] SHAKA, [address]   
   [id number] [place] [address], [address] [facility] Visits Status Start Date End Date 1 New Request 5/25/17 5/25/18 If your referral has a status of pending review or denied, additional information will be sent to support the outcome of this decision. Introducing [facility]! Mireille Mccrary introduces [employer] patient portal. Now you can access parts of your medical record, email your doctor's office, and request medication refills online. 1. In your internet browser, go to [email or web address]"[email or web address]. Practice Ignition/"[employer] 2. Click on the First Time User? Click Here link in the Sign In box. You will see the New Member Sign Up page. 3. Enter your [employer] Access Code exactly as it appears below. You will not need to use this code after youve completed the sign-up process. If you do not sign up before the expiration date, you must request a new code. · [employer] Access Code: [email or web address] Expires: 8/5/2017  4:21 PM 
 
4. Enter the last four digits of your Social Security Number (xxxx) and Date of Birth (mm/dd/yyyy) as indicated and click Submit. You will be taken to the next sign-up page. 5. Create a [employer] ID. This will be your [employer] login ID and cannot be changed, so think of one that is secure and easy to remember. 6. Create a [employer] password. You can change your password at any time. 7. Enter your Password Reset Question and Answer. This can be used at a later time if you forget your password. 8. Enter your e-mail address. You will receive e-mail notification when new information is available in [address] 9. Click Sign Up. You can now view and download portions of your medical record. 10. Click the Download Summary menu link to download a portable copy of your medical information. If you have questions, please visit the Frequently Asked Questions section of the [employer] website. Remember, [employer] is NOT to be used for urgent needs. For medical emergencies, dial 911. Now available from your iPhone and Android! Please provide this summary of care documentation to your next provider. Your primary care clinician is listed as Oz Santos. If you have any questions after today's visit, please call [phone].

## 2017-05-25 NOTE — PROGRESS NOTES
Chief Complaint   Patient presents with   [facility]     [address]
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
## 2017-05-26 LAB
25(OH)D3+25(OH)D2 SERPL-MCNC: 11.5 NG/ML (ref 30–100)
ALBUMIN/CREAT UR: 898.9 MG/G CREAT (ref 0–30)
CHOLEST SERPL-MCNC: 214 MG/DL (ref 100–199)
CREAT UR-MCNC: 62.2 MG/DL
HBA1C MFR BLD: 13.1 % (ref 4.8–5.6)
HDLC SERPL-MCNC: 46 MG/DL
INTERPRETATION: NORMAL
LDLC SERPL CALC-MCNC: 145 MG/DL (ref 0–99)
Lab: NORMAL
MICROALBUMIN UR-MCNC: 559.1 UG/ML
PSA SERPL-MCNC: 2.1 NG/ML (ref 0–4)
REFLEX CRITERIA: NORMAL
TRIGL SERPL-MCNC: 117 MG/DL (ref 0–149)
TSH SERPL DL<=0.005 MIU/L-ACNC: 2.99 UIU/ML (ref 0.45–4.5)
VLDLC SERPL CALC-MCNC: 23 MG/DL (ref 5–40)

## 2017-06-07 ENCOUNTER — TELEPHONE (OUTPATIENT)
Dept: FAMILY MEDICINE CLINIC | Age: 82
End: 2017-06-07

## 2017-06-07 NOTE — TELEPHONE ENCOUNTER
----- Message from Joel Emanuel sent at 6/7/2017 11:37 AM EDT -----  Regarding: Dr. Thomas Moreno pt's  with [employer] is requesting pt's most recent A1c result and Blood Pressure readings. Best contact is [phone] ext [phone]. Please leave voice mail per José Miguel Punch it is a secured voicemail.

## 2017-06-12 DIAGNOSIS — E55.9 VITAMIN D DEFICIENCY: Primary | ICD-10-CM

## 2017-06-12 RX ORDER — ERGOCALCIFEROL 1.25 MG/1
50000 CAPSULE ORAL
Qty: 20 CAP | Refills: 1 | Status: SHIPPED | OUTPATIENT
Start: 2017-06-12 | End: 2019-01-01 | Stop reason: SDUPTHER

## 2017-06-26 DIAGNOSIS — E11.42 PERIPHERAL SENSORY NEUROPATHY DUE TO TYPE 2 DIABETES MELLITUS (HCC): ICD-10-CM

## 2017-06-26 RX ORDER — HYDROCODONE BITARTRATE AND ACETAMINOPHEN 5; 325 MG/1; MG/1
1 TABLET ORAL
Qty: 20 TAB | Refills: 0 | Status: SHIPPED | OUTPATIENT
Start: 2017-06-26 | End: 2017-08-02 | Stop reason: SDUPTHER

## 2017-07-05 ENCOUNTER — TELEPHONE (OUTPATIENT)
Dept: FAMILY MEDICINE CLINIC | Age: 82
End: 2017-07-05

## 2017-07-05 NOTE — TELEPHONE ENCOUNTER
----- Message from Maggie Clemons sent at 7/5/2017  9:49 AM EDT -----  Regarding: Dr. Opal CHIANG [phone]     Pt stated, he does not wish to change his scheduled CPE on Tuesday, 07/18/17 at 10:15AM due to his work schedule. Please confirm if this appt can stand.

## 2017-08-02 ENCOUNTER — OFFICE VISIT (OUTPATIENT)
Dept: FAMILY MEDICINE CLINIC | Age: 82
End: 2017-08-02

## 2017-08-02 ENCOUNTER — HOSPITAL ENCOUNTER (OUTPATIENT)
Dept: GENERAL RADIOLOGY | Age: 82
Discharge: HOME | End: 2017-08-02
Payer: MEDICARE

## 2017-08-02 VITALS
RESPIRATION RATE: 16 BRPM | BODY MASS INDEX: 30.02 KG/M2 | HEIGHT: 71 IN | SYSTOLIC BLOOD PRESSURE: 152 MMHG | WEIGHT: 214.4 LBS | HEART RATE: 80 BPM | TEMPERATURE: 97.3 F | DIASTOLIC BLOOD PRESSURE: 68 MMHG | OXYGEN SATURATION: 93 %

## 2017-08-02 DIAGNOSIS — R05.9 COUGHING: ICD-10-CM

## 2017-08-02 DIAGNOSIS — J41.8 MIXED SIMPLE AND MUCOPURULENT CHRONIC BRONCHITIS (HCC): Primary | ICD-10-CM

## 2017-08-02 DIAGNOSIS — E11.42 PERIPHERAL SENSORY NEUROPATHY DUE TO TYPE 2 DIABETES MELLITUS (HCC): ICD-10-CM

## 2017-08-02 DIAGNOSIS — J41.8 MIXED SIMPLE AND MUCOPURULENT CHRONIC BRONCHITIS (HCC): ICD-10-CM

## 2017-08-02 PROCEDURE — 71020 XR CHEST PA LAT: CPT

## 2017-08-02 RX ORDER — INSULIN LISPRO 100 [IU]/ML
INJECTION, SUSPENSION SUBCUTANEOUS
Qty: 10 PEN | Refills: 5
Start: 2017-08-02 | End: 2018-01-30 | Stop reason: SDUPTHER

## 2017-08-02 RX ORDER — HYDROCODONE BITARTRATE AND ACETAMINOPHEN 5; 325 MG/1; MG/1
1 TABLET ORAL
Qty: 30 TAB | Refills: 0 | Status: SHIPPED | OUTPATIENT
Start: 2017-08-02 | End: 2017-09-26 | Stop reason: SDUPTHER

## 2017-08-02 RX ORDER — AZITHROMYCIN 250 MG/1
TABLET, FILM COATED ORAL
Qty: 6 TAB | Refills: 0 | Status: SHIPPED | OUTPATIENT
Start: 2017-08-02 | End: 2017-08-07

## 2017-08-02 NOTE — MR AVS SNAPSHOT
Visit Information Date & Time Provider Department Dept. Phone Encounter #  
 8/2/2017 10:30 AM Karen Vega MD [facility] at [address] [id number] Follow-up Instructions Return in about 5 days (around 8/7/2017), or if symptoms worsen or fail to improve. Your Appointments 8/2/2017 10:30 AM  
COMPLETE PHYSICAL with Karen Vega MD  
[facility] at [facility] Appt Note: CPE; r/s  
 [address] [address]  
[facility] Upcoming Health Maintenance Date Due  
 FOOT EXAM Q1 9/20/1945 EYE EXAM RETINAL OR DILATED Q1 9/20/1945 DTaP/Tdap/Td series (1 - Tdap) 9/20/1956 ZOSTER VACCINE AGE 60> 7/20/1995 INFLUENZA AGE 9 TO ADULT 8/1/2017 HEMOGLOBIN A1C Q6M 11/25/2017 GLAUCOMA SCREENING Q2Y 3/16/2018 MICROALBUMIN Q1 5/25/2018 LIPID PANEL Q1 5/25/2018 MEDICARE YEARLY EXAM 5/26/2018 Allergies as of 8/2/2017  Review Complete On: 8/2/2017 By: Gloria Mayer LPN No Known Allergies Current Immunizations  Reviewed on 5/4/2016 Name Date Influenza High Dose Vaccine PF 9/14/2016 Influenza Vaccine Whole 9/28/2010 ZZZ-RETIRED (DO NOT USE) Pneumococcal Vaccine (Unspecified Type) 11/28/2010  9:00 PM  
  
 Not reviewed this visit You Were Diagnosed With   
  
 Codes Comments Mixed simple and mucopurulent chronic bronchitis ([facility]    -  Primary ICD-10-CM: J41.8 ICD-9-CM: 491.1 Coughing     ICD-10-CM: W34 ICD-9-CM: 786.2 IDDM (insulin dependent diabetes mellitus) ([facility]     ICD-10-CM: E11.9, Z79.4 ICD-9-CM: 250.00, V58.67 Vitals BP Pulse Temp Resp Height(growth percentile) Weight(growth percentile) 152/68 80 97.3 °F (36.3 °C) (Oral) 16 5' 11\" (1.803 m) 214 lb 6.4 oz (97.3 kg) SpO2 BMI Smoking Status 93% 29.9 kg/m2 Former Smoker Vitals History BMI and BSA Data Body Mass Index Body Surface Area  
 29.9 kg/m 2 2.21 m 2 Preferred Pharmacy Pharmacy Name Phone [facility] [address], [address] [place] [address] Drive [phone] Your Updated Medication List  
  
   
This list is accurate as of: 8/2/17  9:52 AM.  Always use your most recent med list.  
  
  
  
  
 * albuterol 90 mcg/actuation inhaler Commonly known as:  PROVENTIL HFA, VENTOLIN HFA, PROAIR HFA Take 2 puffs by inhalation every four (4) hours as needed for Wheezing. * albuterol 1.25 mg/3 mL Nebu Commonly known as:  Ginny Lowing Take 3 mL by inhalation every four (4) hours as needed for Wheezing (wheezing). * Ascensia CONTOUR strip Generic drug:  glucose blood VI test strips MONITOR BLOOD SUGAR TWICE DAILY * glucose blood VI test strips strip Commonly known as:  ACCU-CHEK AGATHA Monitor blood sugar twice daily. ICD 10 Code: E11.65  
  
 aspirin 81 mg chewable tablet Take 81 mg by mouth daily. azithromycin 250 mg tablet Commonly known as:  Rhonda Ream Take 2 tablets today, then take 1 tablet daily * Blood-Glucose Meter monitoring kit Commonly known as:  CONTOUR METER Monitor BS twice daily * Blood-Glucose Meter Misc Commonly known as:  ACCU-CHEK AGATHA PLUS METER Monitor blood sugar twice daily. ICD 10 Code: E11.65  
  
 bumetanide 2 mg tablet Commonly known as:  Rashad Patron Take 1 Tab by mouth daily as needed. citalopram 20 mg tablet Commonly known as:  CELEXA  
TAKE 1 TABLET BY MOUTH EVERY MORNING AS NEEDED FOR ANXIETY DEPPRESSION  
  
 desonide 0.05 % topical ointment Commonly known as:  Panda Wilder Apply  to affected area two (2) times a day. ergocalciferol 50,000 unit capsule Commonly known as:  ERGOCALCIFEROL Take 1 Cap by mouth every seven (7) days. FISH -160-1,000 mg Cap Generic drug:  omega 3-dha-epa-fish oil Take  by mouth. fluticasone furoate 200 mcg/actuation Dsdv inhaler Commonly known as:  ARNUITY ELLIPTA Take 1 Puff by inhalation daily. gabapentin 300 mg capsule Commonly known as:  NEURONTIN  
TAKE 1 CAPSULE BY MOUTH EVERY NIGHT  
  
 glipiZIDE 5 mg tablet Commonly known as:  Randa Stade Take  by mouth two (2) times a day. HYDROcodone-acetaminophen 5-325 mg per tablet Commonly known as:  Emily Boot Take 1 Tab by mouth daily as needed for Pain. Insulin Lisp & Lisp Prot (Hum) 100 unit/mL (75-25) Inpn Commonly known as:  HUMALOG 75-25 MIX 55 units in the morning, 50 units in the evening. Inulin-Sorbitol 2 gram Chew Commonly known as:  Fiber Take 2 g by mouth daily. lisinopril 20 mg tablet Commonly known as:  Raffy Human Take 1 Tab by mouth daily. metOLazone 5 mg tablet Commonly known as:  Rene Parisian Take 5 mg by mouth daily. MYRBETRIQ 25 mg ER tablet Generic drug:  mirabegron ER Take 25 mg by mouth daily. omeprazole 20 mg capsule Commonly known as:  PRILOSEC  
TAKE ONE CAPSULE BY MOUTH EVERY DAY  
  
 rosuvastatin 40 mg tablet Commonly known as:  CRESTOR Take 1 Tab by mouth nightly. tiotropium 18 mcg inhalation capsule Commonly known as:  Susana [facility] [address] Take 1 Cap by inhalation daily. triamcinolone acetonide 0.025 % topical cream  
Commonly known as:  KENALOG  
  
 VESIcare 10 mg tablet Generic drug:  solifenacin Take 10 mg by mouth daily. ZETIA 10 mg tablet Generic drug:  ezetimibe Take 10 mg by mouth daily. * Notice: This list has 6 medication(s) that are the same as other medications prescribed for you. Read the directions carefully, and ask your doctor or other care provider to review them with you. Prescriptions Sent to Pharmacy Refills  
 tiotropium (SPIRIVA WITH HANDIHALER) 18 mcg inhalation capsule 3 Sig: Take 1 Cap by inhalation daily.   
 Class: Normal  
 Pharmacy: [facility] [address] [place] [address], [address] [place] [address] Ph #: [phone] Route: Inhalation  
 fluticasone furoate (ARNUITY ELLIPTA) 200 mcg/actuation dsdv inhaler 5 Sig: Take 1 Puff by inhalation daily. Class: Normal  
 Pharmacy: [facility] [address] [place] [address], [address] [place] [address] Ph #: [phone] Route: Inhalation  
 azithromycin (ZITHROMAX) 250 mg tablet 0 Sig: Take 2 tablets today, then take 1 tablet daily Class: Normal  
 Pharmacy: [facility] [address] [place] [address], [address] [place] [address] Ph #: [phone] Follow-up Instructions Return in about 5 days (around 8/7/2017), or if symptoms worsen or fail to improve. To-Do List   
 08/02/2017 Imaging:  XR CHEST PA LAT Introducing [facility] & HEALTH SERVICES! Ed Malin introduces [employer] patient portal. Now you can access parts of your medical record, email your doctor's office, and request medication refills online. 1. In your internet browser, go to [email or web address] 2. Click on the First Time User? Click Here link in the Sign In box. You will see the New Member Sign Up page. 3. Enter your [employer] Access Code exactly as it appears below. You will not need to use this code after youve completed the sign-up process. If you do not sign up before the expiration date, you must request a new code. · [employer] Access Code: [email or web address] Expires: 8/5/2017  4:21 PM 
 
4. Enter the last four digits of your Social Security Number (xxxx) and Date of Birth (mm/dd/yyyy) as indicated and click Submit. You will be taken to the next sign-up page. 5. Create a [employer] ID. This will be your [employer] login ID and cannot be changed, so think of one that is secure and easy to remember. 6. Create a [employer] password. You can change your password at any time. 7. Enter your Password Reset Question and Answer. This can be used at a later time if you forget your password. 8. Enter your e-mail address. You will receive e-mail notification when new information is available in [address] 9. Click Sign Up. You can now view and download portions of your medical record. 10. Click the Download Summary menu link to download a portable copy of your medical information. If you have questions, please visit the Frequently Asked Questions section of the [employer] website. Remember, [employer] is NOT to be used for urgent needs. For medical emergencies, dial 911. Now available from your iPhone and Android! Please provide this summary of care documentation to your next provider. Your primary care clinician is listed as Mattie Patel. If you have any questions after today's visit, please call [phone].

## 2017-08-02 NOTE — PROGRESS NOTES
Fernanda Correa is a 80 y.o.  male, who's a pleasant gentleman. He still works as a night security,      Coughing 3-4 months with phlegm. Denies fever or chills, SOB, CP. Chronic mild ankle swelling at end of days. Sleep on 1 pillow. Pmhx of smoker, COPD, has Spiriva and albuterol at home but he haven't use for about 6 months. Lungs end expiratory wheezes. Due to his uncontrol diabetes, we'll not be able to use oral steroid. Use inhale steroid, and tiotropium.       DM2: increase his 70/30 to 55u am and 50u pm.  D/c glypizide.       Chronic pain and Diabetic neuropathy bilat feet, norco 5/325mg #30 with a refill will last him 4 months. He only takes them seldomly and PRN for pain from his neuropathy. Discussed side effect and potential for drowsiness with norco and not to drive with it. HTN: BP is good systolic 181V, slight high but at his age won't bee too aggressive, continue as is. Reviewed: active problem list, medication list, allergies, family history, social history, notes from last encounter, lab results    A comprehensive review of systems was negative except for that written in the HPI. No Known Allergies  Current Outpatient Prescriptions on File Prior to Visit   Medication Sig Dispense Refill    ergocalciferol (ERGOCALCIFEROL) 50,000 unit capsule Take 1 Cap by mouth every seven (7) days. 20 Cap 1    omega 3-dha-epa-fish oil (FISH OIL) 100-160-1,000 mg cap Take  by mouth.  aspirin 81 mg chewable tablet Take 81 mg by mouth daily.  citalopram (CELEXA) 20 mg tablet TAKE 1 TABLET BY MOUTH EVERY MORNING AS NEEDED FOR ANXIETY DEPPRESSION 90 Tab 1    bumetanide (BUMEX) 2 mg tablet Take 1 Tab by mouth daily as needed. 90 Tab 1    Blood-Glucose Meter (ACCU-CHEK AGATHA PLUS METER) misc Monitor blood sugar twice daily. ICD 10 Code: E11.65 1 Each 0    glucose blood VI test strips (ACCU-CHEK AGATHA) strip Monitor blood sugar twice daily.  ICD 10 Code: E11.65 200 Strip 3  Blood-Glucose Meter (CONTOUR METER) monitoring kit Monitor BS twice daily 1 Kit 0    ASCENSIA CONTOUR strip MONITOR BLOOD SUGAR TWICE DAILY 150 Strip 10    gabapentin (NEURONTIN) 300 mg capsule TAKE 1 CAPSULE BY MOUTH EVERY NIGHT 90 Cap 1    lisinopril (PRINIVIL, ZESTRIL) 20 mg tablet Take 1 Tab by mouth daily. 90 Tab 1    rosuvastatin (CRESTOR) 40 mg tablet Take 1 Tab by mouth nightly. 90 Tab 1    triamcinolone acetonide (KENALOG) 0.025 % topical cream       omeprazole (PRILOSEC) 20 mg capsule TAKE ONE CAPSULE BY MOUTH EVERY DAY 90 Cap 5    Inulin-Sorbitol (FIBER) 2 gram chew Take 2 g by mouth daily. 90 Tab 3    desonide (DESOWEN) 0.05 % topical ointment Apply  to affected area two (2) times a day. 30 g 0    MYRBETRIQ 25 mg ER tablet Take 25 mg by mouth daily.  VESICARE 10 mg tablet Take 10 mg by mouth daily.  albuterol (PROVENTIL HFA, VENTOLIN HFA) 90 mcg/actuation inhaler Take 2 puffs by inhalation every four (4) hours as needed for Wheezing. 1 Inhaler 0    albuterol (ACCUNEB) 1.25 mg/3 mL nebulizer solution Take 3 mL by inhalation every four (4) hours as needed for Wheezing (wheezing). 25 vial 0    metolazone (ZAROXOLYN) 5 mg tablet Take 5 mg by mouth daily.  ezetimibe (ZETIA) 10 mg tablet Take 10 mg by mouth daily. No current facility-administered medications on file prior to visit.       Patient Active Problem List   Diagnosis Code    Gynecomastia N62    IDDM (insulin dependent diabetes mellitus) ([facility] E11.9, Z79.4    COPD (chronic obstructive pulmonary disease) ([facility]) J44.9    History of chronic CHF Z86.79    Constipation K59.00    CKD (chronic kidney disease) stage 3, GFR 30-59 ml/min N18.3    Uncontrolled type II diabetes mellitus ([facility]) E11.65    Essential hypertension with goal blood pressure less than 140/90 I10    Acute URI J06.9    Mild intermittent asthma with acute exacerbation J45.21    ACP (advance care planning) Z71.89       Visit Vitals    /68  Pulse 80    Temp 97.3 °F (36.3 °C) (Oral)    Resp 16    Ht 5' 11\" (1.803 m)    Wt 214 lb 6.4 oz (97.3 kg)    SpO2 93%    BMI 29.9 kg/m2     General:  alert, cooperative, no distress, appears stated age, moderately obese. Eyes:  conjunctivae/corneas clear. PERRL   Neck: supple, symmetrical, trachea midline, no carotid bruit and no JVD   Lung: clear to auscultation bilaterally   Heart:  regular rate and rhythm, S1, S2 normal, no murmur, click, rub or gallop   Abdomen: soft, non-tender. Extremities: extremities normal, atraumatic, no cyanosis or edema   Pulses: 2+ and symmetric   Neuro: normal without focal findings  mental status, speech normal, alert and oriented x iii       Assessment/Plans:    Diagnoses and all orders for this visit:    1. Mixed simple and mucopurulent chronic bronchitis (HCC)  -     XR CHEST PA LAT; Future  -     tiotropium (SPIRIVA WITH HANDIHALER) 18 mcg inhalation capsule; Take 1 Cap by inhalation daily. -     fluticasone furoate (ARNUITY ELLIPTA) 200 mcg/actuation dsdv inhaler; Take 1 Puff by inhalation daily. -     azithromycin (ZITHROMAX) 250 mg tablet; Take 2 tablets today, then take 1 tablet daily    2. Coughing  -     XR CHEST PA LAT; Future  -     tiotropium (SPIRIVA WITH HANDIHALER) 18 mcg inhalation capsule; Take 1 Cap by inhalation daily. -     fluticasone furoate (ARNUITY ELLIPTA) 200 mcg/actuation dsdv inhaler; Take 1 Puff by inhalation daily. -     azithromycin (ZITHROMAX) 250 mg tablet; Take 2 tablets today, then take 1 tablet daily    3. IDDM (insulin dependent diabetes mellitus) ([facility])  -     Insulin Lisp & Lisp Prot, Hum, (HUMALOG 75-25 MIX) 100 unit/mL (75-25) inpn; 55 units in the morning, 50 units in the evening. 4. 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 Discussed plans, risk/benefits of treatments/observations. Through the use of shared decision making, above plans were agreed upon. Medication compliance advised. Patient verbalized understanding. Follow-up Disposition:  Return in about 5 days (around 8/7/2017), or if symptoms worsen or fail to improve.       Tom Rollins MD  8/2/2017

## 2017-08-21 DIAGNOSIS — E11.42 PERIPHERAL SENSORY NEUROPATHY DUE TO TYPE 2 DIABETES MELLITUS (HCC): ICD-10-CM

## 2017-08-21 RX ORDER — GABAPENTIN 300 MG/1
CAPSULE ORAL
Qty: 90 CAP | Refills: 1 | Status: SHIPPED | OUTPATIENT
Start: 2017-08-21 | End: 2020-01-01

## 2017-08-30 ENCOUNTER — TELEPHONE (OUTPATIENT)
Dept: FAMILY MEDICINE CLINIC | Age: 82
End: 2017-08-30

## 2017-08-30 NOTE — TELEPHONE ENCOUNTER
----- Message from Mendez Moody sent at 8/29/2017  4:55 PM EDT -----  Regarding: Dr. Jonathan Reaves, from [employer], is requesting a diagnosis code regarding services provided on 05/25/17 (no additional information was disclosed). Best contact [phone], reference number [id number].

## 2017-09-22 DIAGNOSIS — K21.9 GASTROESOPHAGEAL REFLUX DISEASE WITHOUT ESOPHAGITIS: ICD-10-CM

## 2017-09-22 DIAGNOSIS — R60.0 PEDAL EDEMA: ICD-10-CM

## 2017-09-22 RX ORDER — OMEPRAZOLE 20 MG/1
CAPSULE, DELAYED RELEASE ORAL
Qty: 90 CAP | Refills: 1 | Status: SHIPPED | OUTPATIENT
Start: 2017-09-22

## 2017-09-22 RX ORDER — BUMETANIDE 2 MG/1
2 TABLET ORAL
Qty: 90 TAB | Refills: 1 | Status: SHIPPED | OUTPATIENT
Start: 2017-09-22 | End: 2018-08-24 | Stop reason: SDUPTHER

## 2017-09-26 ENCOUNTER — OFFICE VISIT (OUTPATIENT)
Dept: FAMILY MEDICINE CLINIC | Age: 82
End: 2017-09-26

## 2017-09-26 VITALS
HEIGHT: 71 IN | HEART RATE: 93 BPM | BODY MASS INDEX: 29.57 KG/M2 | OXYGEN SATURATION: 91 % | WEIGHT: 211.2 LBS | DIASTOLIC BLOOD PRESSURE: 73 MMHG | RESPIRATION RATE: 16 BRPM | SYSTOLIC BLOOD PRESSURE: 151 MMHG | TEMPERATURE: 96.8 F

## 2017-09-26 DIAGNOSIS — E11.42 PERIPHERAL SENSORY NEUROPATHY DUE TO TYPE 2 DIABETES MELLITUS (HCC): ICD-10-CM

## 2017-09-26 DIAGNOSIS — Z23 ENCOUNTER FOR IMMUNIZATION: Primary | ICD-10-CM

## 2017-09-26 DIAGNOSIS — N39.41 URGE INCONTINENCE OF URINE: ICD-10-CM

## 2017-09-26 LAB
BILIRUB UR QL STRIP: NEGATIVE
GLUCOSE UR-MCNC: NORMAL MG/DL
KETONES P FAST UR STRIP-MCNC: NEGATIVE MG/DL
PH UR STRIP: 5.5 [PH] (ref 4.6–8)
PROT UR QL STRIP: NORMAL MG/DL
SP GR UR STRIP: 1.02 (ref 1–1.03)
UA UROBILINOGEN AMB POC: NORMAL (ref 0.2–1)
URINALYSIS CLARITY POC: CLEAR
URINALYSIS COLOR POC: YELLOW
URINE BLOOD POC: NORMAL
URINE LEUKOCYTES POC: NEGATIVE
URINE NITRITES POC: NEGATIVE

## 2017-09-26 RX ORDER — HYDROCODONE BITARTRATE AND ACETAMINOPHEN 5; 325 MG/1; MG/1
1 TABLET ORAL
Qty: 25 TAB | Refills: 0 | Status: SHIPPED | OUTPATIENT
Start: 2017-09-26 | End: 2018-01-30 | Stop reason: SDUPTHER

## 2017-09-26 NOTE — MR AVS SNAPSHOT
Visit Information Date & Time Provider Department Dept. Phone Encounter #  
 9/26/2017 10:00 AM Lori Dang MD [facility] at [address] [id number] Follow-up Instructions Return in about 2 weeks (around 10/10/2017), or if symptoms worsen or fail to improve. Your Appointments 11/1/2017 10:00 AM  
ROUTINE CARE with Lori Dang MD  
[facility] at [facility] Appt Note: 3mo fu  
 [address] [phone]  
[facility] Upcoming Health Maintenance Date Due  
 FOOT EXAM Q1 9/20/1945 EYE EXAM RETINAL OR DILATED Q1 9/20/1945 DTaP/Tdap/Td series (1 - Tdap) 9/20/1956 ZOSTER VACCINE AGE 60> 7/20/1995 INFLUENZA AGE 9 TO ADULT 8/1/2017 HEMOGLOBIN A1C Q6M 11/25/2017 GLAUCOMA SCREENING Q2Y 3/16/2018 MICROALBUMIN Q1 5/25/2018 LIPID PANEL Q1 5/25/2018 MEDICARE YEARLY EXAM 5/26/2018 Allergies as of 9/26/2017  Review Complete On: 9/26/2017 By: Jorge Gomes LPN No Known Allergies Current Immunizations  Reviewed on 5/4/2016 Name Date Influenza High Dose Vaccine PF  Incomplete, 9/14/2016 Influenza Vaccine Whole 9/28/2010 ZZZ-RETIRED (DO NOT USE) Pneumococcal Vaccine (Unspecified Type) 11/28/2010  9:00 PM  
  
 Not reviewed this visit You Were Diagnosed With   
  
 Codes Comments Encounter for immunization    -  Primary ICD-10-CM: D51 ICD-9-CM: V03.89 Urge incontinence of urine     ICD-10-CM: N39.41 
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 
ICD-9-CM: 250.60, 356.2 Vitals BP Pulse Temp Resp Height(growth percentile) Weight(growth percentile) 151/73 93 96.8 °F (36 °C) (Oral) 16 5' 11\" (1.803 m) 211 lb 3.2 oz (95.8 kg) SpO2 BMI Smoking Status 91% 29.46 kg/m2 Former Smoker Vitals History BMI and BSA Data Body Mass Index Body Surface Area  
 29.46 kg/m 2 2.19 m 2 Preferred Pharmacy Pharmacy Name Phone [facility] [address] [phone] Your Updated Medication List  
  
   
This list is accurate as of: 9/26/17 10:37 AM.  Always use your most recent med list.  
  
  
  
  
 * albuterol 90 mcg/actuation inhaler Commonly known as:  PROVENTIL HFA, VENTOLIN HFA, PROAIR HFA Take 2 puffs by inhalation every four (4) hours as needed for Wheezing. * albuterol 1.25 mg/3 mL Nebu Commonly known as:  Axel Rowels Take 3 mL by inhalation every four (4) hours as needed for Wheezing (wheezing). * Ascensia CONTOUR strip Generic drug:  glucose blood VI test strips MONITOR BLOOD SUGAR TWICE DAILY * glucose blood VI test strips strip Commonly known as:  ACCU-CHEK AGATHA Monitor blood sugar twice daily. ICD 10 Code: E11.65  
  
 aspirin 81 mg chewable tablet Take 81 mg by mouth daily. * Blood-Glucose Meter monitoring kit Commonly known as:  CONTOUR METER Monitor BS twice daily * Blood-Glucose Meter Misc Commonly known as:  ACCU-CHEK AGATHA PLUS METER Monitor blood sugar twice daily. ICD 10 Code: E11.65  
  
 bumetanide 2 mg tablet Commonly known as:  Ole Paulo Take 1 Tab by mouth daily as needed. citalopram 20 mg tablet Commonly known as:  CELEXA  
TAKE 1 TABLET BY MOUTH EVERY MORNING AS NEEDED FOR ANXIETY DEPPRESSION  
  
 desonide 0.05 % topical ointment Commonly known as:  Jose R Piqua Apply  to affected area two (2) times a day. ergocalciferol 50,000 unit capsule Commonly known as:  ERGOCALCIFEROL Take 1 Cap by mouth every seven (7) days. FISH -160-1,000 mg Cap Generic drug:  omega 3-dha-epa-fish oil Take  by mouth. fluticasone furoate 200 mcg/actuation Dsdv inhaler Commonly known as:  ARNUITY ELLIPTA Take 1 Puff by inhalation daily. gabapentin 300 mg capsule Commonly known as:  NEURONTIN  
TAKE 1 CAPSULE BY MOUTH EVERY NIGHT HYDROcodone-acetaminophen 5-325 mg per tablet Commonly known as:  Karen Laity Take 1 Tab by mouth daily as needed for Pain. Insulin Lisp & Lisp Prot (Hum) 100 unit/mL (75-25) Inpn Commonly known as:  HUMALOG 75-25 MIX 55 units in the morning, 50 units in the evening. Inulin-Sorbitol 2 gram Chew Commonly known as:  Fiber Take 2 g by mouth daily. lisinopril 20 mg tablet Commonly known as:  Pecola Cypher Take 1 Tab by mouth daily. metOLazone 5 mg tablet Commonly known as:  Deisi Hamburger Take 5 mg by mouth daily. MYRBETRIQ 25 mg ER tablet Generic drug:  mirabegron ER Take 25 mg by mouth daily. omeprazole 20 mg capsule Commonly known as:  PRILOSEC  
TAKE ONE CAPSULE BY MOUTH EVERY DAY  
  
 rosuvastatin 40 mg tablet Commonly known as:  CRESTOR Take 1 Tab by mouth nightly. tiotropium 18 mcg inhalation capsule Commonly known as:  [address] [place] [address] Drive Take 1 Cap by inhalation daily. triamcinolone acetonide 0.025 % topical cream  
Commonly known as:  KENALOG  
  
 VESIcare 10 mg tablet Generic drug:  solifenacin Take 10 mg by mouth daily. ZETIA 10 mg tablet Generic drug:  ezetimibe Take 10 mg by mouth daily. * Notice: This list has 6 medication(s) that are the same as other medications prescribed for you. Read the directions carefully, and ask your doctor or other care provider to review them with you. Prescriptions Printed Refills HYDROcodone-acetaminophen (NORCO) 5-325 mg per tablet 0 Sig: Take 1 Tab by mouth daily as needed for Pain. Class: Print Route: Oral  
  
We Performed the Following INFLUENZA VIRUS VACCINE, HIGH DOSE SEASONAL, PRESERVATIVE FREE [76238 CPT(R)] GA IMMUNIZ ADMIN,1 SINGLE/COMB VAC/TOXOID [id number] CPT(R)] Follow-up Instructions Return in about 2 weeks (around 10/10/2017), or if symptoms worsen or fail to improve. To-Do List   
 09/26/2017 Imaging:  US PELV NON OBS  LTD Introducing [facility] & HEALTH SERVICES! [address] introduces [employer] patient portal. Now you can access parts of your medical record, email your doctor's office, and request medication refills online. 1. In your internet browser, go to [email or web address] 2. Click on the First Time User? Click Here link in the Sign In box. You will see the New Member Sign Up page. 3. Enter your [employer] Access Code exactly as it appears below. You will not need to use this code after youve completed the sign-up process. If you do not sign up before the expiration date, you must request a new code. · [employer] Access Code: [email or web address] Expires: 12/25/2017 10:36 AM 
 
4. Enter the last four digits of your Social Security Number (xxxx) and Date of Birth (mm/dd/yyyy) as indicated and click Submit. You will be taken to the next sign-up page. 5. Create a [employer] ID. This will be your [employer] login ID and cannot be changed, so think of one that is secure and easy to remember. 6. Create a [employer] password. You can change your password at any time. 7. Enter your Password Reset Question and Answer. This can be used at a later time if you forget your password. 8. Enter your e-mail address. You will receive e-mail notification when new information is available in [address] 9. Click Sign Up. You can now view and download portions of your medical record. 10. Click the Download Summary menu link to download a portable copy of your medical information. If you have questions, please visit the Frequently Asked Questions section of the [employer] website. Remember, [employer] is NOT to be used for urgent needs. For medical emergencies, dial 911. Now available from your iPhone and Android! Please provide this summary of care documentation to your next provider. Your primary care clinician is listed as Shravan Elizabeth. If you have any questions after today's visit, please call [phone].

## 2017-09-26 NOTE — PROGRESS NOTES
Sd Velasquez is a 80 y.o. male who presents for routine immunizations. He denies any symptoms , reactions or allergies that would exclude them from being immunized today. Risks and adverse reactions were discussed and the VIS was given to them. All questions were addressed. He was observed for 15  min post injection. There were no reactions observed. Minor Hunger, LPN      Chief Complaint   Patient presents with    Nocturia     x 2/3 weeks dribbling during the days   reports he took fasting BS this am it was 148.

## 2017-09-27 NOTE — PROGRESS NOTES
Alejandro Elias is a 80 y.o.  male, who's a pleasant gentleman. Urinary urgency. This has been more recent past 10 days. At night he wakes up 3 times to use the toilets. Recently he dribbles and almost can't make it to the toilet. Noted have been drinking a lot of mountain Dew recently. PSA was normal in May 25/17. UA POC here unimpressive. Likely irritation of bladder from sodas. Advice to cut it down. Scheduled alarm toilet time. kegel exercise.      Chronic pain and Diabetic neuropathy bilat feet, norco 5/325mg #30 with a refill will last him 4 months. He only takes them seldomly and PRN for pain from his neuropathy. Discussed side effect and potential for drowsiness with norco and not to drive with it. HTN: BP is good systolic 176Z, slight high but at his age won't bee too aggressive, continue as is. Reviewed: active problem list, medication list, allergies, family history, social history, notes from last encounter, lab results    A comprehensive review of systems was negative except for that written in the HPI. No Known Allergies  Current Outpatient Prescriptions on File Prior to Visit   Medication Sig Dispense Refill    omeprazole (PRILOSEC) 20 mg capsule TAKE ONE CAPSULE BY MOUTH EVERY DAY 90 Cap 1    bumetanide (BUMEX) 2 mg tablet Take 1 Tab by mouth daily as needed. 90 Tab 1    gabapentin (NEURONTIN) 300 mg capsule TAKE 1 CAPSULE BY MOUTH EVERY NIGHT 90 Cap 1    tiotropium (SPIRIVA WITH HANDIHALER) 18 mcg inhalation capsule Take 1 Cap by inhalation daily. 30 Cap 3    fluticasone furoate (ARNUITY ELLIPTA) 200 mcg/actuation dsdv inhaler Take 1 Puff by inhalation daily. 1 Inhaler 5    Insulin Lisp & Lisp Prot, Hum, (HUMALOG 75-25 MIX) 100 unit/mL (75-25) inpn 55 units in the morning, 50 units in the evening. 10 Pen 5    ergocalciferol (ERGOCALCIFEROL) 50,000 unit capsule Take 1 Cap by mouth every seven (7) days.  20 Cap 1    omega 3-dha-epa-fish oil (FISH OIL) 100-160-1,000 mg cap Take  by mouth.  aspirin 81 mg chewable tablet Take 81 mg by mouth daily.  citalopram (CELEXA) 20 mg tablet TAKE 1 TABLET BY MOUTH EVERY MORNING AS NEEDED FOR ANXIETY DEPPRESSION 90 Tab 1    Blood-Glucose Meter (ACCU-CHEK AGATHA PLUS METER) misc Monitor blood sugar twice daily. ICD 10 Code: E11.65 1 Each 0    glucose blood VI test strips (ACCU-CHEK AGATHA) strip Monitor blood sugar twice daily. ICD 10 Code: E11.65 200 Strip 3    Blood-Glucose Meter (CONTOUR METER) monitoring kit Monitor BS twice daily 1 Kit 0    ASCENSIA CONTOUR strip MONITOR BLOOD SUGAR TWICE DAILY 150 Strip 10    lisinopril (PRINIVIL, ZESTRIL) 20 mg tablet Take 1 Tab by mouth daily. 90 Tab 1    rosuvastatin (CRESTOR) 40 mg tablet Take 1 Tab by mouth nightly. 90 Tab 1    triamcinolone acetonide (KENALOG) 0.025 % topical cream       Inulin-Sorbitol (FIBER) 2 gram chew Take 2 g by mouth daily. 90 Tab 3    desonide (DESOWEN) 0.05 % topical ointment Apply  to affected area two (2) times a day. 30 g 0    MYRBETRIQ 25 mg ER tablet Take 25 mg by mouth daily.  VESICARE 10 mg tablet Take 10 mg by mouth daily.  albuterol (PROVENTIL HFA, VENTOLIN HFA) 90 mcg/actuation inhaler Take 2 puffs by inhalation every four (4) hours as needed for Wheezing. 1 Inhaler 0    albuterol (ACCUNEB) 1.25 mg/3 mL nebulizer solution Take 3 mL by inhalation every four (4) hours as needed for Wheezing (wheezing). 25 vial 0    metolazone (ZAROXOLYN) 5 mg tablet Take 5 mg by mouth daily.  ezetimibe (ZETIA) 10 mg tablet Take 10 mg by mouth daily. No current facility-administered medications on file prior to visit.       Patient Active Problem List   Diagnosis Code    Gynecomastia N62    IDDM (insulin dependent diabetes mellitus) ([facility] [address] E11.9, Z79.4    COPD (chronic obstructive pulmonary disease) (formerly [facility]) J44.9    History of chronic CHF Z86.79    Constipation K59.00    CKD (chronic kidney disease) stage 3, GFR 30-59 ml/min N18.3    Uncontrolled type II diabetes mellitus ([facility] [address] E11.65    Essential hypertension with goal blood pressure less than 140/90 I10    Acute URI J06.9    Mild intermittent asthma with acute exacerbation J45.21    ACP (advance care planning) Z71.89       Visit Vitals    /73    Pulse 93    Temp 96.8 °F (36 °C) (Oral)    Resp 16    Ht 5' 11\" (1.803 m)    Wt 211 lb 3.2 oz (95.8 kg)    SpO2 91%    BMI 29.46 kg/m2     General:  alert, cooperative, no distress, appears stated age, moderately obese. Eyes:  conjunctivae/corneas clear. PERRL   Neck: supple, symmetrical, trachea midline, no carotid bruit and no JVD   Lung: clear to auscultation bilaterally   Heart:  regular rate and rhythm, S1, S2 normal, no murmur, click, rub or gallop   Abdomen: soft, non-tender. Extremities: extremities normal, atraumatic, no cyanosis or edema   Pulses: 2+ and symmetric   Neuro: normal without focal findings  mental status, speech normal, alert and oriented x iii     Results for orders placed or performed in visit on 09/26/17   AMB POC URINALYSIS DIP STICK AUTO W/O MICRO   Result Value Ref Range    Color (UA POC) Yellow     Clarity (UA POC) Clear     Glucose (UA POC) 2+ Negative    Bilirubin (UA POC) Negative Negative    Ketones (UA POC) Negative Negative    Specific gravity (UA POC) 1.020 1.001 - 1.035    Blood (UA POC) Trace Negative    pH (UA POC) 5.5 4.6 - 8.0    Protein (UA POC) 2+ Negative mg/dL    Urobilinogen (UA POC) 0.2 mg/dL 0.2 - 1    Nitrites (UA POC) Negative Negative    Leukocyte esterase (UA POC) Negative Negative       Assessment/Plans:    Diagnoses and all orders for this visit:    1. Encounter for immunization  -     Influenza virus vaccine (FLUZONE HIGH-DOSE) 65 years and older  -     AL IMMUNIZ ADMIN,1 SINGLE/COMB VAC/TOXOID  -     AMB POC URINALYSIS DIP STICK AUTO W/O MICRO    2. Urge incontinence of urine  -     US PELV NON OBS  LTD; Future    3.  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 Discussed plans, risk/benefits of treatments/observations. Through the use of shared decision making, above plans were agreed upon. Medication compliance advised. Patient verbalized understanding. Follow-up Disposition:  Return in about 2 weeks (around 10/10/2017), or if symptoms worsen or fail to improve.       Avtar Calles MD  9/27/2017

## 2017-10-12 ENCOUNTER — OFFICE VISIT (OUTPATIENT)
Dept: FAMILY MEDICINE CLINIC | Age: 82
End: 2017-10-12

## 2017-10-12 VITALS
RESPIRATION RATE: 16 BRPM | BODY MASS INDEX: 29.99 KG/M2 | TEMPERATURE: 97 F | OXYGEN SATURATION: 96 % | SYSTOLIC BLOOD PRESSURE: 162 MMHG | HEIGHT: 71 IN | DIASTOLIC BLOOD PRESSURE: 79 MMHG | HEART RATE: 85 BPM | WEIGHT: 214.2 LBS

## 2017-10-12 DIAGNOSIS — N18.30 CKD (CHRONIC KIDNEY DISEASE) STAGE 3, GFR 30-59 ML/MIN (HCC): ICD-10-CM

## 2017-10-12 DIAGNOSIS — Z79.899 ENCOUNTER FOR LONG-TERM (CURRENT) USE OF MEDICATIONS: ICD-10-CM

## 2017-10-12 DIAGNOSIS — N39.498 OTHER URINARY INCONTINENCE: Primary | ICD-10-CM

## 2017-10-12 DIAGNOSIS — I10 ESSENTIAL HYPERTENSION WITH GOAL BLOOD PRESSURE LESS THAN 140/90: ICD-10-CM

## 2017-10-12 NOTE — PROGRESS NOTES
Waynette Duane is a 80 y.o.  male, who's a pleasant gentleman. Urinary urgency. Has resolved since stopping sodas. dm2 insulin dependent, uncontrolled  CKD monitor by nephrology. HTN: BP slight higher today, but usually 140s-150s. Reviewed: active problem list, medication list, allergies, family history, social history, notes from last encounter, lab results    A comprehensive review of systems was negative except for that written in the HPI. No Known Allergies  Current Outpatient Prescriptions on File Prior to Visit   Medication Sig Dispense Refill    HYDROcodone-acetaminophen (NORCO) 5-325 mg per tablet Take 1 Tab by mouth daily as needed for Pain. 25 Tab 0    omeprazole (PRILOSEC) 20 mg capsule TAKE ONE CAPSULE BY MOUTH EVERY DAY 90 Cap 1    bumetanide (BUMEX) 2 mg tablet Take 1 Tab by mouth daily as needed. 90 Tab 1    gabapentin (NEURONTIN) 300 mg capsule TAKE 1 CAPSULE BY MOUTH EVERY NIGHT 90 Cap 1    tiotropium (SPIRIVA WITH HANDIHALER) 18 mcg inhalation capsule Take 1 Cap by inhalation daily. 30 Cap 3    fluticasone furoate (ARNUITY ELLIPTA) 200 mcg/actuation dsdv inhaler Take 1 Puff by inhalation daily. 1 Inhaler 5    Insulin Lisp & Lisp Prot, Hum, (HUMALOG 75-25 MIX) 100 unit/mL (75-25) inpn 55 units in the morning, 50 units in the evening. 10 Pen 5    ergocalciferol (ERGOCALCIFEROL) 50,000 unit capsule Take 1 Cap by mouth every seven (7) days. 20 Cap 1    omega 3-dha-epa-fish oil (FISH OIL) 100-160-1,000 mg cap Take  by mouth.  aspirin 81 mg chewable tablet Take 81 mg by mouth daily.  citalopram (CELEXA) 20 mg tablet TAKE 1 TABLET BY MOUTH EVERY MORNING AS NEEDED FOR ANXIETY DEPPRESSION 90 Tab 1    Blood-Glucose Meter (ACCU-CHEK AGATHA PLUS METER) misc Monitor blood sugar twice daily. ICD 10 Code: E11.65 1 Each 0    glucose blood VI test strips (ACCU-CHEK AGATHA) strip Monitor blood sugar twice daily.  ICD 10 Code: E11.65 200 Strip 3    Blood-Glucose Meter (CONTOUR METER) monitoring kit Monitor BS twice daily 1 Kit 0    ASCENSIA CONTOUR strip MONITOR BLOOD SUGAR TWICE DAILY 150 Strip 10    lisinopril (PRINIVIL, ZESTRIL) 20 mg tablet Take 1 Tab by mouth daily. 90 Tab 1    rosuvastatin (CRESTOR) 40 mg tablet Take 1 Tab by mouth nightly. 90 Tab 1    triamcinolone acetonide (KENALOG) 0.025 % topical cream       Inulin-Sorbitol (FIBER) 2 gram chew Take 2 g by mouth daily. 90 Tab 3    desonide (DESOWEN) 0.05 % topical ointment Apply  to affected area two (2) times a day. 30 g 0    MYRBETRIQ 25 mg ER tablet Take 25 mg by mouth daily.  VESICARE 10 mg tablet Take 10 mg by mouth daily.  albuterol (PROVENTIL HFA, VENTOLIN HFA) 90 mcg/actuation inhaler Take 2 puffs by inhalation every four (4) hours as needed for Wheezing. 1 Inhaler 0    albuterol (ACCUNEB) 1.25 mg/3 mL nebulizer solution Take 3 mL by inhalation every four (4) hours as needed for Wheezing (wheezing). 25 vial 0    metolazone (ZAROXOLYN) 5 mg tablet Take 5 mg by mouth daily.  ezetimibe (ZETIA) 10 mg tablet Take 10 mg by mouth daily. No current facility-administered medications on file prior to visit.       Patient Active Problem List   Diagnosis Code    Gynecomastia N62    IDDM (insulin dependent diabetes mellitus) ([facility] [address] E11.9, Z79.4    COPD (chronic obstructive pulmonary disease) ([facility]) J44.9    History of chronic CHF Z86.79    Constipation K59.00    CKD (chronic kidney disease) stage 3, GFR 30-59 ml/min N18.3    Uncontrolled type II diabetes mellitus ([facility]) E11.65    Essential hypertension with goal blood pressure less than 140/90 I10    Acute URI J06.9    Mild intermittent asthma with acute exacerbation J45.21    ACP (advance care planning) Z71.89       Visit Vitals    /79    Pulse 85    Temp 97 °F (36.1 °C) (Oral)    Resp 16    Ht 5' 11\" (1.803 m)    Wt 214 lb 3.2 oz (97.2 kg)    SpO2 96%    BMI 29.87 kg/m2     General:  alert, cooperative, no distress, appears stated age, moderately obese. Eyes:  conjunctivae/corneas clear. PERRL   Neck: supple, symmetrical, trachea midline, no carotid bruit and no JVD   Lung: clear to auscultation bilaterally   Heart:  regular rate and rhythm, S1, S2 normal, no murmur, click, rub or gallop   Abdomen: soft, non-tender. Extremities: extremities normal, atraumatic, no cyanosis or edema   Pulses: 2+ and symmetric   Neuro: normal without focal findings  mental status, speech normal, alert and oriented x iii       Assessment/Plans:    Diagnoses and all orders for this visit:    1. Other urinary incontinence    2. Uncontrolled type 2 diabetes mellitus with diabetic nephropathy, with long-term current use of insulin (HCC)  -     HEMOGLOBIN A1C W/O EAG  -     METABOLIC PANEL, COMPREHENSIVE  -     HGB & HCT    3. CKD (chronic kidney disease) stage 3, GFR 30-59 ml/min  -     HEMOGLOBIN A1C W/O EAG  -     METABOLIC PANEL, COMPREHENSIVE  -     HGB & HCT    4. Essential hypertension with goal blood pressure less than 140/90  -     HEMOGLOBIN A1C W/O EAG  -     METABOLIC PANEL, COMPREHENSIVE  -     HGB & HCT    5. Encounter for long-term (current) use of medications  -     HEMOGLOBIN A1C W/O EAG  -     METABOLIC PANEL, COMPREHENSIVE  -     HGB & HCT    Discussed plans, risk/benefits of treatments/observations. Through the use of shared decision making, above plans were agreed upon. Medication compliance advised. Patient verbalized understanding.      Follow-up Disposition:  Return in about 3 months (around 1/12/2018) for chronic care, sooner if labs abnormal.      Shahida Hernandez MD  10/12/2017

## 2017-10-12 NOTE — PROGRESS NOTES
Chief Complaint   Patient presents with    Incontinence     better   2 week f/u no c/o burning upon urinationation, no more incontientence.

## 2017-10-12 NOTE — MR AVS SNAPSHOT
Visit Information Date & Time Provider Department Dept. Phone Encounter #  
 10/12/2017 10:20 AM Jaya Bergman MD [facility] at [address] [id number] Follow-up Instructions Return in about 3 months (around 1/12/2018) for chronic care, sooner if labs abnormal. Upcoming Health Maintenance Date Due  
 FOOT EXAM Q1 9/20/1945 EYE EXAM RETINAL OR DILATED Q1 9/20/1945 DTaP/Tdap/Td series (1 - Tdap) 9/20/1956 ZOSTER VACCINE AGE 60> 7/20/1995 HEMOGLOBIN A1C Q6M 11/25/2017 GLAUCOMA SCREENING Q2Y 3/16/2018 MICROALBUMIN Q1 5/25/2018 LIPID PANEL Q1 5/25/2018 MEDICARE YEARLY EXAM 5/26/2018 Allergies as of 10/12/2017  Review Complete On: 10/12/2017 By: Leopold Arabian, LPN No Known Allergies Current Immunizations  Reviewed on 9/26/2017 Name Date Influenza High Dose Vaccine PF 9/26/2017, 9/14/2016 Influenza Vaccine Whole 9/28/2010 ZZZ-RETIRED (DO NOT USE) Pneumococcal Vaccine (Unspecified Type) 11/28/2010  9:00 PM  
  
 Not reviewed this visit You Were Diagnosed With   
  
 Codes Comments Encounter for long-term (current) use of medications    -  Primary ICD-10-CM: Y97.860 ICD-9-CM: V58.69 CKD (chronic kidney disease) stage 3, GFR 30-59 ml/min     ICD-10-CM: N18.3 ICD-9-CM: 416. 3 Uncontrolled type 2 diabetes mellitus with diabetic nephropathy, with long-term current use of insulin (HCC)     ICD-10-CM: E11.21, E11.65, Z79.4 ICD-9-CM: 250.42, 583.81, V58.67 Essential hypertension with goal blood pressure less than 140/90     ICD-10-CM: I10 
ICD-9-CM: 401.9 Other urinary incontinence     ICD-10-CM: Q94.291 ICD-9-CM: 788.39 Vitals BP Pulse Temp Resp Height(growth percentile) Weight(growth percentile) 162/79 85 97 °F (36.1 °C) (Oral) 16 5' 11\" (1.803 m) 214 lb 3.2 oz (97.2 kg) SpO2 BMI Smoking Status 96% 29.87 kg/m2 Former Smoker Vitals History BMI and BSA Data Body Mass Index Body Surface Area  
 29.87 kg/m 2 2.21 m 2 Preferred Pharmacy Pharmacy Name Phone [facility] [address] [phone] Your Updated Medication List  
  
   
This list is accurate as of: 10/12/17 10:45 AM.  Always use your most recent med list.  
  
  
  
  
 * albuterol 90 mcg/actuation inhaler Commonly known as:  PROVENTIL HFA, VENTOLIN HFA, PROAIR HFA Take 2 puffs by inhalation every four (4) hours as needed for Wheezing. * albuterol 1.25 mg/3 mL Nebu Commonly known as:  Lyric Rakers Take 3 mL by inhalation every four (4) hours as needed for Wheezing (wheezing). * Ascensia CONTOUR strip Generic drug:  glucose blood VI test strips MONITOR BLOOD SUGAR TWICE DAILY * glucose blood VI test strips strip Commonly known as:  ACCU-CHEK AGATHA Monitor blood sugar twice daily. ICD 10 Code: E11.65  
  
 aspirin 81 mg chewable tablet Take 81 mg by mouth daily. * Blood-Glucose Meter monitoring kit Commonly known as:  CONTOUR METER Monitor BS twice daily * Blood-Glucose Meter Misc Commonly known as:  ACCU-CHEK AGATHA PLUS METER Monitor blood sugar twice daily. ICD 10 Code: E11.65  
  
 bumetanide 2 mg tablet Commonly known as:  Eleni Barber Take 1 Tab by mouth daily as needed. citalopram 20 mg tablet Commonly known as:  CELEXA  
TAKE 1 TABLET BY MOUTH EVERY MORNING AS NEEDED FOR ANXIETY DEPPRESSION  
  
 desonide 0.05 % topical ointment Commonly known as:  Viona Libman Apply  to affected area two (2) times a day. ergocalciferol 50,000 unit capsule Commonly known as:  ERGOCALCIFEROL Take 1 Cap by mouth every seven (7) days. FISH -160-1,000 mg Cap Generic drug:  omega 3-dha-epa-fish oil Take  by mouth. fluticasone furoate 200 mcg/actuation Dsdv inhaler Commonly known as:  ARNUITY ELLIPTA Take 1 Puff by inhalation daily. gabapentin 300 mg capsule Commonly known as:  NEURONTIN  
TAKE 1 CAPSULE BY MOUTH EVERY NIGHT HYDROcodone-acetaminophen 5-325 mg per tablet Commonly known as:  Mary Carmen Vickers Take 1 Tab by mouth daily as needed for Pain. Insulin Lisp & Lisp Prot (Hum) 100 unit/mL (75-25) Inpn Commonly known as:  HUMALOG 75-25 MIX 55 units in the morning, 50 units in the evening. Inulin-Sorbitol 2 gram Chew Commonly known as:  Fiber Take 2 g by mouth daily. lisinopril 20 mg tablet Commonly known as:  Alexandro Alexa Take 1 Tab by mouth daily. metOLazone 5 mg tablet Commonly known as:  Sally Vidales Take 5 mg by mouth daily. MYRBETRIQ 25 mg ER tablet Generic drug:  mirabegron ER Take 25 mg by mouth daily. omeprazole 20 mg capsule Commonly known as:  PRILOSEC  
TAKE ONE CAPSULE BY MOUTH EVERY DAY  
  
 rosuvastatin 40 mg tablet Commonly known as:  CRESTOR Take 1 Tab by mouth nightly. tiotropium 18 mcg inhalation capsule Commonly known as:  [facility] [address] Take 1 Cap by inhalation daily. triamcinolone acetonide 0.025 % topical cream  
Commonly known as:  KENALOG  
  
 VESIcare 10 mg tablet Generic drug:  solifenacin Take 10 mg by mouth daily. ZETIA 10 mg tablet Generic drug:  ezetimibe Take 10 mg by mouth daily. * Notice: This list has 6 medication(s) that are the same as other medications prescribed for you. Read the directions carefully, and ask your doctor or other care provider to review them with you. We Performed the Following HEMOGLOBIN A1C W/O EAG [69288 CPT(R)] HGB & HCT [27304 CPT(R)] METABOLIC PANEL, COMPREHENSIVE [64630 CPT(R)] Follow-up Instructions Return in about 3 months (around 1/12/2018) for chronic care, sooner if labs abnormal.  
  
  
Introducing [facility]!    
 Alexandra Villagran introduces [employer] patient portal. Now you can access parts of your medical record, email your doctor's office, and request medication refills online. 1. In your internet browser, go to [email or web address] 2. Click on the First Time User? Click Here link in the Sign In box. You will see the New Member Sign Up page. 3. Enter your [employer]Â® Access Code exactly as it appears below. You will not need to use this code after youve completed the sign-up process. If you do not sign up before the expiration date, you must request a new code. · [employer] Access Code: [email or web address] Expires: 12/25/2017 10:36 AM 
 
4. Enter the last four digits of your Social Security Number (xxxx) and Date of Birth (mm/dd/yyyy) as indicated and click Submit. You will be taken to the next sign-up page. 5. Create a [employer]Â® ID. This will be your [employer] login ID and cannot be changed, so think of one that is secure and easy to remember. 6. Create a [employer]® password. You can change your password at any time. 7. Enter your Password Reset Question and Answer. This can be used at a later time if you forget your password. 8. Enter your e-mail address. You will receive e-mail notification when new information is available in [address] 9. Click Sign Up. You can now view and download portions of your medical record. 10. Click the Download Summary menu link to download a portable copy of your medical information. If you have questions, please visit the Frequently Asked Questions section of the [employer] website. Remember, [employer] is NOT to be used for urgent needs. For medical emergencies, dial 911. Now available from your iPhone and Android! Please provide this summary of care documentation to your next provider. Your primary care clinician is listed as Mitesh Vazquez. If you have any questions after today's visit, please call [phone].

## 2017-10-13 LAB
ALBUMIN SERPL-MCNC: 4 G/DL (ref 3.5–4.7)
ALBUMIN/GLOB SERPL: 1.7 {RATIO} (ref 1.2–2.2)
ALP SERPL-CCNC: 77 IU/L (ref 39–117)
ALT SERPL-CCNC: 11 IU/L (ref 0–44)
AST SERPL-CCNC: 14 IU/L (ref 0–40)
BILIRUB SERPL-MCNC: 0.3 MG/DL (ref 0–1.2)
BUN SERPL-MCNC: 23 MG/DL (ref 8–27)
BUN/CREAT SERPL: 17 (ref 10–24)
CALCIUM SERPL-MCNC: 10.1 MG/DL (ref 8.6–10.2)
CHLORIDE SERPL-SCNC: 97 MMOL/L (ref 96–106)
CO2 SERPL-SCNC: 29 MMOL/L (ref 18–29)
CREAT SERPL-MCNC: 1.38 MG/DL (ref 0.76–1.27)
GLOBULIN SER CALC-MCNC: 2.4 G/DL (ref 1.5–4.5)
GLUCOSE SERPL-MCNC: 205 MG/DL (ref 65–99)
HBA1C MFR BLD: 14.2 % (ref 4.8–5.6)
HCT VFR BLD AUTO: 46.2 % (ref 37.5–51)
HGB BLD-MCNC: 15.7 G/DL (ref 12.6–17.7)
INTERPRETATION: NORMAL
Lab: NORMAL
POTASSIUM SERPL-SCNC: 4.9 MMOL/L (ref 3.5–5.2)
PROT SERPL-MCNC: 6.4 G/DL (ref 6–8.5)
SODIUM SERPL-SCNC: 140 MMOL/L (ref 134–144)

## 2017-10-18 ENCOUNTER — HOSPITAL ENCOUNTER (EMERGENCY)
Age: 82
Discharge: HOME | End: 2017-10-18
Attending: EMERGENCY MEDICINE
Payer: MEDICARE

## 2017-10-18 ENCOUNTER — APPOINTMENT (OUTPATIENT)
Dept: GENERAL RADIOLOGY | Age: 82
End: 2017-10-18
Attending: EMERGENCY MEDICINE
Payer: MEDICARE

## 2017-10-18 VITALS
TEMPERATURE: 97.5 F | WEIGHT: 215.83 LBS | RESPIRATION RATE: 15 BRPM | OXYGEN SATURATION: 95 % | SYSTOLIC BLOOD PRESSURE: 175 MMHG | HEIGHT: 71 IN | BODY MASS INDEX: 30.22 KG/M2 | DIASTOLIC BLOOD PRESSURE: 77 MMHG | HEART RATE: 101 BPM

## 2017-10-18 DIAGNOSIS — S43.401A SPRAIN OF RIGHT SHOULDER, UNSPECIFIED SHOULDER SPRAIN TYPE, INITIAL ENCOUNTER: ICD-10-CM

## 2017-10-18 DIAGNOSIS — S20.211A CONTUSION OF RIB ON RIGHT SIDE, INITIAL ENCOUNTER: ICD-10-CM
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
## 2017-10-18 PROCEDURE — 71111 X-RAY EXAM RIBS/CHEST4/> VWS: CPT

## 2017-10-18 PROCEDURE — 74011250637 HC RX REV CODE- 250/637: Performed by: EMERGENCY MEDICINE

## 2017-10-18 PROCEDURE — 99283 EMERGENCY DEPT VISIT LOW MDM: CPT

## 2017-10-18 PROCEDURE — 73060 X-RAY EXAM OF HUMERUS: CPT

## 2017-10-18 PROCEDURE — 73030 X-RAY EXAM OF SHOULDER: CPT

## 2017-10-18 RX ORDER — OXYCODONE AND ACETAMINOPHEN 5; 325 MG/1; MG/1
2 TABLET ORAL
Status: COMPLETED | OUTPATIENT
Start: 2017-10-18 | End: 2017-10-18

## 2017-10-18 RX ORDER — OXYCODONE AND ACETAMINOPHEN 5; 325 MG/1; MG/1
1 TABLET ORAL
Qty: 10 TAB | Refills: 0 | Status: SHIPPED | OUTPATIENT
Start: 2017-10-18 | End: 2017-10-18

## 2017-10-18 RX ORDER — NAPROXEN 250 MG/1
250 TABLET ORAL 2 TIMES DAILY WITH MEALS
Qty: 20 TAB | Refills: 0 | Status: SHIPPED | OUTPATIENT
Start: 2017-10-18 | End: 2019-01-01

## 2017-10-18 RX ORDER — METHOCARBAMOL 750 MG/1
750 TABLET, FILM COATED ORAL 3 TIMES DAILY
Qty: 20 TAB | Refills: 0 | Status: SHIPPED | OUTPATIENT
Start: 2017-10-18 | End: 2018-12-04

## 2017-10-18 RX ORDER — OXYCODONE AND ACETAMINOPHEN 5; 325 MG/1; MG/1
1 TABLET ORAL
Qty: 10 TAB | Refills: 0 | Status: SHIPPED | OUTPATIENT
Start: 2017-10-18 | End: 2018-04-03 | Stop reason: ALTCHOICE

## 2017-10-18 RX ORDER — NAPROXEN 250 MG/1
250 TABLET ORAL 2 TIMES DAILY WITH MEALS
Qty: 20 TAB | Refills: 0 | Status: SHIPPED | OUTPATIENT
Start: 2017-10-18 | End: 2017-10-18

## 2017-10-18 RX ORDER — METHOCARBAMOL 750 MG/1
750 TABLET, FILM COATED ORAL 3 TIMES DAILY
Qty: 20 TAB | Refills: 0 | Status: SHIPPED | OUTPATIENT
Start: 2017-10-18 | End: 2017-10-18

## 2017-10-18 RX ORDER — IBUPROFEN 600 MG/1
600 TABLET ORAL ONCE
Status: COMPLETED | OUTPATIENT
Start: 2017-10-18 | End: 2017-10-18

## 2017-10-18 RX ADMIN — OXYCODONE HYDROCHLORIDE AND ACETAMINOPHEN 2 TABLET: 5; 325 TABLET ORAL at 16:15

## 2017-10-18 RX ADMIN — IBUPROFEN 600 MG: 600 TABLET, FILM COATED ORAL at 16:16

## 2017-10-18 NOTE — DISCHARGE INSTRUCTIONS
Preventing Falls: Care Instructions  Your Care Instructions  Getting around your home safely can be a challenge if you have injuries or health problems that make it easy for you to fall. Loose rugs and furniture in walkways are among the dangers for many older people who have problems walking or who have poor eyesight. People who have conditions such as arthritis, osteoporosis, or dementia also have to be careful not to fall. You can make your home safer with a few simple measures. Follow-up care is a key part of your treatment and safety. Be sure to make and go to all appointments, and call your doctor if you are having problems. It's also a good idea to know your test results and keep a list of the medicines you take. How can you care for yourself at home? Taking care of yourself  · You may get dizzy if you do not drink enough water. To prevent dehydration, drink plenty of fluids, enough so that your urine is light yellow or clear like water. Choose water and other caffeine-free clear liquids. If you have kidney, heart, or liver disease and have to limit fluids, talk with your doctor before you increase the amount of fluids you drink. · Exercise regularly to improve your strength, muscle tone, and balance. Walk if you can. Swimming may be a good choice if you cannot walk easily. · Have your vision and hearing checked each year or any time you notice a change. If you have trouble seeing and hearing, you might not be able to avoid objects and could lose your balance. · Know the side effects of the medicines you take. Ask your doctor or pharmacist whether the medicines you take can affect your balance. Sleeping pills or sedatives can affect your balance. · Limit the amount of alcohol you drink. Alcohol can impair your balance and other senses. · Ask your doctor whether calluses or corns on your feet need to be removed.  If you wear loose-fitting shoes because of calluses or corns, you can lose your balance and fall. · Talk to your doctor if you have numbness in your feet. Preventing falls at home  · Remove raised doorway thresholds, throw rugs, and clutter. Repair loose carpet or raised areas in the floor. · Move furniture and electrical cords to keep them out of walking paths. · Use nonskid floor wax, and wipe up spills right away, especially on ceramic tile floors. · If you use a walker or cane, put rubber tips on it. If you use crutches, clean the bottoms of them regularly with an abrasive pad, such as steel wool. · Keep your house well lit, especially [place], and outside walkways. Use night-lights in areas such as hallways and bathrooms. Add extra light switches or use remote switches (such as switches that go on or off when you clap your hands) to make it easier to turn lights on if you have to get up during the night. · Install sturdy handrails on stairways. · Move items in your cabinets so that the things you use a lot are on the lower shelves (about waist level). · Keep a cordless phone and a flashlight with new batteries by your bed. If possible, put a phone in each of the main rooms of your house, or carry a cell phone in case you fall and cannot reach a phone. Or, you can wear a device around your neck or wrist. You push a button that sends a signal for help. · Wear low-heeled shoes that fit well and give your feet good support. Use footwear with nonskid soles. Check the heels and soles of your shoes for wear. Repair or replace worn heels or soles. · Do not wear socks without shoes on wood floors. · Walk on the grass when the sidewalks are slippery. If you live in an area that gets snow and ice in the winter, sprinkle salt on slippery steps and sidewalks. Preventing falls in the bath  · Install grab bars and nonskid mats inside and outside your shower or tub and near the toilet and sinks. · Use shower chairs and bath benches.   · Use a hand-held shower head that will allow you to sit while showering. · Get into a tub or shower by putting the weaker leg in first. Get out of a tub or shower with your strong side first.  · Repair loose toilet seats and consider installing a raised toilet seat to make getting on and off the toilet easier. · Keep your bathroom door unlocked while you are in the shower. Where can you learn more? Go to http://agnes-ca.info/. Enter [id number] in the search box to learn more about \"Preventing Falls: Care Instructions. \"  Current as of: August 4, 2016  Content Version: 11.3  © 4311-1869 [employer]. Care instructions adapted under license by "[employer] (which disclaims liability or warranty for this information). If you have questions about a medical condition or this instruction, always ask your healthcare professional. [place] [address] any warranty or liability for your use of this information. Chest Contusion: Care Instructions  Your Care Instructions  A chest contusion, or bruise, is caused by a fall or direct blow to the chest. Car crashes, falls, getting punched, and injury from bicycle handlebars are common causes of chest contusions. A very forceful blow to the chest can injure the heart or blood vessels in the chest, the lungs, the airway, the liver, or the spleen. Pain may be caused by an injury to muscles, cartilage, or ribs. Deep breathing, coughing, or sneezing can increase your pain. Lying on the injured area also can cause pain. Follow-up care is a key part of your treatment and safety. Be sure to make and go to all appointments, and call your doctor if you are having problems. It's also a good idea to know your test results and keep a list of the medicines you take. How can you care for yourself at home? · Rest and protect the injured or sore area. Stop, change, or take a break from any activity that may be causing your pain.   · Put ice or a cold pack on the area for 10 to 20 minutes at a time. Put a thin cloth between the ice and your skin. · After 2 or 3 days, if your swelling is gone, apply a heating pad set on low or a warm cloth to your chest. Some doctors suggest that you go back and forth between hot and cold. Put a thin cloth between the heating pad and your skin. · Do not wrap or tape your ribs for support. This may cause you to take smaller breaths, which could increase your risk of pneumonia and lung collapse. · Ask your doctor if you can take an over-the-counter pain medicine, such as acetaminophen (Tylenol), ibuprofen (Advil, Motrin), or naproxen (Aleve). Be safe with medicines. Read and follow all instructions on the label. · Take your medicines exactly as prescribed. Call your doctor if you think you are having a problem with your medicine. · Gentle stretching and massage may help you feel better after a few days of rest. Stretch slowly to the point just before discomfort begins, then hold the stretch for at least 15 to 30 seconds. Do this 3 or 4 times per day. · As your pain gets better, slowly return to your normal activities. Be patient, because chest bruises can take weeks or months to heal. Any increased pain may be a sign that you need to rest a while longer. When should you call for help? Call 911 anytime you think you may need emergency care. For example, call if:  · You have severe trouble breathing. · You cough up blood. Call your doctor now or seek immediate medical care if:  · You have belly pain. · You are dizzy or lightheaded, or you feel like you may faint. · You develop new symptoms with the chest pain. · Your chest pain gets worse. · You have a fever. · You have some shortness of breath. · You have a cough that brings up mucus from the lungs. Watch closely for changes in your health, and be sure to contact your doctor if:  · Your chest pain is not improving after 1 week. Where can you learn more?   Go to http://agnes-ca.info/. Enter I174 in the search box to learn more about \"Chest Contusion: Care Instructions. \"  Current as of: March 20, 2017  Content Version: 11.3  © 9264-0880 [employer]. Care instructions adapted under license by [employer] (which disclaims liability or warranty for this information). If you have questions about a medical condition or this instruction, always ask your healthcare professional. [place] [address] any warranty or liability for your use of this information. Shoulder Sprain: Care Instructions  Your Care Instructions    A shoulder sprain occurs when you stretch or tear a ligament in your shoulder. Ligaments are tough tissues that connect one bone to another. A sprain can happen during sports, a fall, or projects around the house. Shoulder sprains usually get better with treatment at home. Follow-up care is a key part of your treatment and safety. Be sure to make and go to all appointments, and call your doctor if you are having problems. It's also a good idea to know your test results and keep a list of the medicines you take. How can you care for yourself at home? · Rest and protect your shoulder. Try to stop or reduce any action that causes pain. · If your doctor gave you a sling or immobilizer, wear it as directed. A sling or immobilizer supports your shoulder and may make you more comfortable. · Put ice or a cold pack on your shoulder for 10 to 20 minutes at a time. Try to do this every 1 to 2 hours for the next 3 days (when you are awake) or until the swelling goes down. Put a thin cloth between the ice and your skin. Some doctors suggest alternating between hot and cold. · Be safe with medicines. Read and follow all instructions on the label. ¨ If the doctor gave you a prescription medicine for pain, take it as prescribed.   ¨ If you are not taking a prescription pain medicine, ask your doctor if you can take an over-the-counter medicine. · For the first day or two after an injury, avoid things that might increase swelling, such as hot showers, hot tubs, or hot packs. · After 2 or 3 days, if your swelling is gone, apply a heating pad set on low or a warm cloth to your shoulder. This helps keep your shoulder flexible. Some doctors suggest that you go back and forth between hot and cold. Put a thin cloth between the heating pad and your skin. · Follow your doctor's or physical therapist's directions for exercises. · Return to your usual level of activity slowly. When should you call for help? Call your doctor now or seek immediate medical care if:  · Your pain is worse. · You cannot move your shoulder. · Your arm is cool or pale or changes color below the shoulder. · You have tingling, weakness, or numbness in your arm. Watch closely for changes in your health, and be sure to contact your doctor if:  · You do not get better as expected. Where can you learn more? Go to http://agnes-ca.info/. Enter Q056 in the search box to learn more about \"Shoulder Sprain: Care Instructions. \"  Current as of: March 21, 2017  Content Version: 11.3  © 5192-8266 [employer]. Care instructions adapted under license by [employer] (which disclaims liability or warranty for this information). If you have questions about a medical condition or this instruction, always ask your healthcare professional. [place] [address] any warranty or liability for your use of this information.
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
## 2018-01-02 PROBLEM — F33.9 RECURRENT DEPRESSION (HCC): Status: ACTIVE | Noted: 2018-01-02

## 2018-01-02 PROBLEM — E11.21 TYPE 2 DIABETES MELLITUS WITH NEPHROPATHY (HCC): Status: ACTIVE | Noted: 2018-01-02

## 2018-01-02 PROBLEM — E55.9 VITAMIN D DEFICIENCY: Status: ACTIVE | Noted: 2018-01-02

## 2018-01-03 ENCOUNTER — TELEPHONE (OUTPATIENT)
Dept: FAMILY MEDICINE CLINIC | Age: 83
End: 2018-01-03

## 2018-01-03 NOTE — TELEPHONE ENCOUNTER
Corrie Schuster from [employer] a  called, stating she needs to verify the diagnosis of Heart failure for him, so that she can send him a scale to his home    Please call [phone]  Ext [phone]

## 2018-01-17 ENCOUNTER — DOCUMENTATION ONLY (OUTPATIENT)
Dept: FAMILY MEDICINE CLINIC | Age: 83
End: 2018-01-17

## 2018-01-17 NOTE — PROGRESS NOTES
Written prescription for Miri Romero dated 3/23/2017 destroyed due to never being picked up by patient.

## 2018-01-30 DIAGNOSIS — E11.42 PERIPHERAL SENSORY NEUROPATHY DUE TO TYPE 2 DIABETES MELLITUS (HCC): ICD-10-CM

## 2018-01-30 RX ORDER — HYDROCODONE BITARTRATE AND ACETAMINOPHEN 5; 325 MG/1; MG/1
1 TABLET ORAL
Qty: 25 TAB | Refills: 0 | Status: SHIPPED | OUTPATIENT
Start: 2018-01-30 | End: 2018-04-03 | Stop reason: SDUPTHER

## 2018-01-30 RX ORDER — INSULIN LISPRO 100 [IU]/ML
INJECTION, SUSPENSION SUBCUTANEOUS
Qty: 10 PEN | Refills: 5 | Status: SHIPPED | OUTPATIENT
Start: 2018-01-30 | End: 2018-05-21 | Stop reason: ALTCHOICE

## 2018-01-30 NOTE — TELEPHONE ENCOUNTER
----- Message from Alex Renteria sent at 1/30/2018 12:31 PM EST -----  Regarding: Dr. Esther Bright  Pt is requesting refill on \"Insulin Pen 75/25\" and \"Hydrocodone\" to be picked up.     Best contact [phone]

## 2018-03-16 ENCOUNTER — DOCUMENTATION ONLY (OUTPATIENT)
Dept: FAMILY MEDICINE CLINIC | Age: 83
End: 2018-03-16

## 2018-04-03 ENCOUNTER — OFFICE VISIT (OUTPATIENT)
Dept: FAMILY MEDICINE CLINIC | Age: 83
End: 2018-04-03

## 2018-04-03 VITALS
OXYGEN SATURATION: 92 % | TEMPERATURE: 96.6 F | HEIGHT: 71 IN | DIASTOLIC BLOOD PRESSURE: 69 MMHG | SYSTOLIC BLOOD PRESSURE: 130 MMHG | RESPIRATION RATE: 20 BRPM | HEART RATE: 84 BPM | WEIGHT: 213.8 LBS | BODY MASS INDEX: 29.93 KG/M2

## 2018-04-03 DIAGNOSIS — N18.30 CKD (CHRONIC KIDNEY DISEASE) STAGE 3, GFR 30-59 ML/MIN (HCC): ICD-10-CM

## 2018-04-03 DIAGNOSIS — L02.91 ABSCESS: ICD-10-CM

## 2018-04-03 DIAGNOSIS — I10 ESSENTIAL HYPERTENSION WITH GOAL BLOOD PRESSURE LESS THAN 140/90: ICD-10-CM

## 2018-04-03 DIAGNOSIS — E11.42 PERIPHERAL SENSORY NEUROPATHY DUE TO TYPE 2 DIABETES MELLITUS (HCC): ICD-10-CM

## 2018-04-03 DIAGNOSIS — E11.21 TYPE 2 DIABETES MELLITUS WITH NEPHROPATHY (HCC): ICD-10-CM

## 2018-04-03 PROBLEM — R32 URINARY INCONTINENCE: Status: ACTIVE | Noted: 2018-04-03

## 2018-04-03 RX ORDER — CEPHALEXIN 500 MG/1
500 CAPSULE ORAL 2 TIMES DAILY
Qty: 14 CAP | Refills: 0 | Status: SHIPPED | OUTPATIENT
Start: 2018-04-03 | End: 2018-04-10

## 2018-04-03 RX ORDER — TRIAMCINOLONE ACETONIDE 1 MG/G
CREAM TOPICAL
Refills: 1 | COMMUNITY
Start: 2018-01-19 | End: 2018-04-03 | Stop reason: SDUPTHER

## 2018-04-03 RX ORDER — HYDROCODONE BITARTRATE AND ACETAMINOPHEN 5; 325 MG/1; MG/1
1 TABLET ORAL
Qty: 25 TAB | Refills: 0 | Status: SHIPPED | OUTPATIENT
Start: 2018-04-03 | End: 2018-06-21 | Stop reason: SDUPTHER

## 2018-04-03 NOTE — PROGRESS NOTES
Bree Burgess is a 80 y.o. male     has a past medical history of Abnormal prostate exam; ACP (advance care planning) (5/25/2017); Arthritis; Asbestosis(501); CAD (coronary artery disease); Calculus of kidney; CKD (chronic kidney disease) stage 3, GFR 30-59 ml/min (6/6/2016); CKD (chronic kidney disease) stage 3, GFR 30-59 ml/min (6/6/2016); Congestive heart failure ([facility] COPD; Depression; Essential hypertension with goal blood pressure less than 140/90 (6/6/2016); GERD (gastroesophageal reflux disease); [facility] Mild intermittent asthma with acute exacerbation (6/6/2016); Other \"heavy-for-dates\" infants; Other ill-defined conditions(799.89); PUD (peptic ulcer disease); Uncontrolled type II diabetes mellitus ([facility] (6/6/2016); and Urinary incontinence (4/3/2018). His wife is currently in the hospital for O2 desat. 1 month ago had a abscess front chest, he got some pus out but it's still draining. Denies fever or chills. DM2: Last A1C 6 months ago at 14%. He didn't f/u with his endo. He doesn't routinely check his BG or keep logs book. We'll do another refer to endo. 70/30 to 55u am and 50u pm.        Chronic pain and Diabetic neuropathy bilat feet, norco 5/325mg #25 with a refill will last him 4 months. He only takes them seldomly and PRN for pain from his neuropathy. Discussed side effect and potential for drowsiness with norco and not to drive with it.       HTN: BP is good systolic 035G, slight high but at his age won't bee too aggressive, continue as is. Reviewed: active problem list, medication list, allergies, notes from last encounter, lab results    A comprehensive review of systems was negative except for that written in the HPI. No Known Allergies  Current Outpatient Prescriptions on File Prior to Visit   Medication Sig Dispense Refill    Insulin Lisp & Lisp Prot, Hum, (HUMALOG 75-25 MIX) 100 unit/mL (75-25) inpn 55 units in the morning, 50 units in the evening.  10 Pen 5    methocarbamol (ROBAXIN-750) 750 mg tablet Take 1 Tab by mouth three (3) times daily. 20 Tab 0    omeprazole (PRILOSEC) 20 mg capsule TAKE ONE CAPSULE BY MOUTH EVERY DAY 90 Cap 1    bumetanide (BUMEX) 2 mg tablet Take 1 Tab by mouth daily as needed. 90 Tab 1    gabapentin (NEURONTIN) 300 mg capsule TAKE 1 CAPSULE BY MOUTH EVERY NIGHT 90 Cap 1    fluticasone furoate (ARNUITY ELLIPTA) 200 mcg/actuation dsdv inhaler Take 1 Puff by inhalation daily. 1 Inhaler 5    ergocalciferol (ERGOCALCIFEROL) 50,000 unit capsule Take 1 Cap by mouth every seven (7) days. 20 Cap 1    omega 3-dha-epa-fish oil (FISH OIL) 100-160-1,000 mg cap Take  by mouth.  aspirin 81 mg chewable tablet Take 81 mg by mouth daily.  citalopram (CELEXA) 20 mg tablet TAKE 1 TABLET BY MOUTH EVERY MORNING AS NEEDED FOR ANXIETY DEPPRESSION 90 Tab 1    Blood-Glucose Meter (ACCU-CHEK AGATHA PLUS METER) misc Monitor blood sugar twice daily. ICD 10 Code: E11.65 1 Each 0    glucose blood VI test strips (ACCU-CHEK AGATHA) strip Monitor blood sugar twice daily. ICD 10 Code: E11.65 200 Strip 3    Blood-Glucose Meter (CONTOUR METER) monitoring kit Monitor BS twice daily 1 Kit 0    [employer] CONTOUR strip MONITOR BLOOD SUGAR TWICE DAILY 150 Strip 10    lisinopril (PRINIVIL, ZESTRIL) 20 mg tablet Take 1 Tab by mouth daily. 90 Tab 1    rosuvastatin (CRESTOR) 40 mg tablet Take 1 Tab by mouth nightly. 90 Tab 1    triamcinolone acetonide (KENALOG) 0.025 % topical cream       Inulin-Sorbitol (FIBER) 2 gram chew Take 2 g by mouth daily. 90 Tab 3    desonide (DESOWEN) 0.05 % topical ointment Apply  to affected area two (2) times a day. 30 g 0    MYRBETRIQ 25 mg ER tablet Take 25 mg by mouth daily.  VESICARE 10 mg tablet Take 10 mg by mouth daily.  albuterol (PROVENTIL HFA, VENTOLIN HFA) 90 mcg/actuation inhaler Take 2 puffs by inhalation every four (4) hours as needed for Wheezing.  1 Inhaler 0    albuterol (ACCUNEB) 1.25 mg/3 mL nebulizer solution Take 3 mL by inhalation every four (4) hours as needed for Wheezing (wheezing). 25 vial 0    metolazone (ZAROXOLYN) 5 mg tablet Take 5 mg by mouth daily.  ezetimibe (ZETIA) 10 mg tablet Take 10 mg by mouth daily.  naproxen (NAPROSYN) 250 mg tablet Take 1 Tab by mouth two (2) times daily (with meals). 20 Tab 0    tiotropium (SPIRIVA WITH HANDIHALER) 18 mcg inhalation capsule Take 1 Cap by inhalation daily. 30 Cap 3     No current facility-administered medications on file prior to visit. Patient Active Problem List   Diagnosis Code    Gynecomastia N62    [facility] mellitus) [facility] E11.9, Z79.4    COPD (chronic obstructive pulmonary disease) ([facility]) J44.9    History of chronic CHF Z86.79    Constipation K59.00    CKD (chronic kidney disease) stage 3, GFR 30-59 ml/min N18.3    Uncontrolled type II diabetes mellitus ([facility]) E11.65    Essential hypertension with goal blood pressure less than 140/90 I10    Mild intermittent asthma with acute exacerbation J45.21    ACP (advance care planning) Z71.89    Vitamin D deficiency E55.9    Type 2 diabetes mellitus with nephropathy ([facility]) E11.21    Recurrent depression ([facility]) F33.9    Urinary incontinence R32       Visit Vitals    /69 (BP 1 Location: Left arm, BP Patient Position: Sitting)    Pulse 84    Temp 96.6 °F (35.9 °C) (Oral)    Resp 20    Ht 5' 11\" (1.803 m)    Wt 213 lb 12.8 oz (97 kg)    SpO2 92%    BMI 29.82 kg/m2       General appearance: alert, cooperative, no distress, appears stated age  Neurologic: Alert and oriented X 3, normal strength and tone, symmetric. Normal without focal findings. Cranial nerves 2-12 intact. Normal coordination and gait. Mental status: Alert, oriented, thought content appropriate, affect: stable, mood-congruent. Head: Normocephalic, without obvious abnormality, atraumatic  Eyes: conjunctivae/corneas clear. PERRL, EOM's intact.    Neck: supple, symmetrical, trachea midline, no JVD  Lungs: clear to auscultation bilaterally  Heart: regular rate and rhythm  Abdomen: soft, non-tender. Extremities: extremities normal, atraumatic, no cyanosis or edema    Wound right mid chest about 1.5X1cm opening, scab. Minor whitish discharge. No redness or pain, no induration. Assessment/Plans:    Diagnoses and all orders for this visit:    1. IDDM (insulin dependent diabetes mellitus) ([facility])  -     METABOLIC PANEL, COMPREHENSIVE  -     HEMOGLOBIN A1C W/O EAG    2. Uncontrolled type 2 diabetes mellitus with diabetic nephropathy, with long-term current use of insulin ([facility]  -     [facility]  -     METABOLIC PANEL, COMPREHENSIVE  -     HEMOGLOBIN A1C W/O EAG    3. Type 2 diabetes mellitus with nephropathy ([facility])  -     METABOLIC PANEL, COMPREHENSIVE  -     HEMOGLOBIN A1C W/O EAG    4. CKD (chronic kidney disease) stage 3, GFR 30-59 ml/min  -     METABOLIC PANEL, COMPREHENSIVE  -     HEMOGLOBIN A1C W/O EAG    5. Essential hypertension with goal blood pressure less than 559/26  -     METABOLIC PANEL, COMPREHENSIVE    6. 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 7. Abscess  -     cephALEXin (KEFLEX) 500 mg capsule; Take 1 Cap by mouth two (2) times a day for 7 days. Discussed plans, risk/benefits of treatments/observations. Through the use of shared decision making, above plans were agreed upon. Medication compliance advised. Patient verbalized understanding.      Follow-up Disposition:  Return in about 4 months (around 8/3/2018) for diabetes, htn, CKD, meds, labs sooner if labs abnormal.      Demetrice Amezcua MD  4/3/2018

## 2018-04-03 NOTE — MR AVS SNAPSHOT
[address] [facility] 
[phone] Patient: Brenda Walters MRN: [id number] VSD:9/30/8720 Visit Information Date & Time Provider Department Dept. Phone Encounter #  
 4/3/2018 10:00 AM Avtar Calles MD [facility] at [address] [id number] Follow-up Instructions Return in about 4 months (around 8/3/2018) for diabetes, htn, CKD, meds, labs sooner if labs abnormal. Upcoming Health Maintenance Date Due  
 FOOT EXAM Q1 9/20/1945 EYE EXAM RETINAL OR DILATED Q1 9/20/1945 DTaP/Tdap/Td series (1 - Tdap) 9/20/1956 ZOSTER VACCINE AGE 60> 7/20/1995 GLAUCOMA SCREENING Q2Y 3/16/2018 HEMOGLOBIN A1C Q6M 4/12/2018 MICROALBUMIN Q1 5/25/2018 LIPID PANEL Q1 5/25/2018 MEDICARE YEARLY EXAM 5/26/2018 Allergies as of 4/3/2018  Review Complete On: 4/3/2018 By: Avtar Calles MD  
 No Known Allergies Current Immunizations  Reviewed on 9/26/2017 Name Date Influenza High Dose Vaccine PF 9/26/2017, 9/14/2016 Influenza Vaccine Whole 9/28/2010 ZZZ-RETIRED (DO NOT USE) Pneumococcal Vaccine (Unspecified Type) 11/28/2010  9:00 PM  
  
 Not reviewed this visit You Were Diagnosed With   
  
 Codes Comments IDDM (insulin dependent diabetes mellitus) ([facility]    -  Primary ICD-10-CM: E11.9, Z79.4 ICD-9-CM: 250.00, V58.67 Uncontrolled type 2 diabetes mellitus with diabetic nephropathy, with long-term current use of insulin (HCC)     ICD-10-CM: E11.21, E11.65, Z79.4 ICD-9-CM: 250.42, 583.81, V58.67 Type 2 diabetes mellitus with nephropathy (HCC)     ICD-10-CM: E11.21 
ICD-9-CM: 250.40, 583.81   
 CKD (chronic kidney disease) stage 3, GFR 30-59 ml/min     ICD-10-CM: N18.3 ICD-9-CM: 719. 3 Essential hypertension with goal blood pressure less than 140/90     ICD-10-CM: I10 
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 
ICD-9-CM: 250.60, 356.2 Abscess     ICD-10-CM: L02.91 
ICD-9-CM: 778. 9 Vitals BP Pulse Temp Resp Height(growth percentile) Weight(growth percentile) 130/69 (BP 1 Location: Left arm, BP Patient Position: Sitting) 84 96.6 °F (35.9 °C) (Oral) 20 5' 11\" (1.803 m) 213 lb 12.8 oz (97 kg) SpO2 BMI Smoking Status 92% 29.82 kg/m2 Former Smoker Vitals History BMI and BSA Data Body Mass Index Body Surface Area  
 29.82 kg/m 2 2.2 m 2 Preferred Pharmacy Pharmacy Name Phone [facility]/PHARMACY #[id number]- [place], [place] - [address] [phone] Your Updated Medication List  
  
   
This list is accurate as of 4/3/18 10:17 AM.  Always use your most recent med list.  
  
  
  
  
 * albuterol 90 mcg/actuation inhaler Commonly known as:  PROVENTIL HFA, VENTOLIN HFA, PROAIR HFA Take 2 puffs by inhalation every four (4) hours as needed for Wheezing. * albuterol 1.25 mg/3 mL Nebu Commonly known as:  Consuelo Goods Take 3 mL by inhalation every four (4) hours as needed for Wheezing (wheezing). * Ascensia CONTOUR strip Generic drug:  glucose blood VI test strips MONITOR BLOOD SUGAR TWICE DAILY * glucose blood VI test strips strip Commonly known as:  ACCU-CHEK AGATHA Monitor blood sugar twice daily. ICD 10 Code: E11.65  
  
 aspirin 81 mg chewable tablet Take 81 mg by mouth daily. * Blood-Glucose Meter monitoring kit Commonly known as:  CONTOUR METER Monitor BS twice daily * Blood-Glucose Meter Misc Commonly known as:  ACCU-CHEK AGATHA PLUS METER Monitor blood sugar twice daily. ICD 10 Code: E11.65  
  
 bumetanide 2 mg tablet Commonly known as:  Lisa Lathe Take 1 Tab by mouth daily as needed. cephALEXin 500 mg capsule Commonly known as:  Kita Hahildaer Take 1 Cap by mouth two (2) times a day for 7 days. citalopram 20 mg tablet Commonly known as:  Porter Limon TAKE 1 TABLET BY MOUTH EVERY MORNING AS NEEDED FOR ANXIETY DEPPRESSION  
  
 desonide 0.05 % topical ointment Commonly known as:  Leigh Patel Apply  to affected area two (2) times a day. ergocalciferol 50,000 unit capsule Commonly known as:  ERGOCALCIFEROL Take 1 Cap by mouth every seven (7) days. FISH -160-1,000 mg Cap Generic drug:  omega 3-dha-epa-fish oil Take  by mouth. fluticasone furoate 200 mcg/actuation Dsdv inhaler Commonly known as:  ARNUITY ELLIPTA Take 1 Puff by inhalation daily. gabapentin 300 mg capsule Commonly known as:  NEURONTIN  
TAKE 1 CAPSULE BY MOUTH EVERY NIGHT HYDROcodone-acetaminophen 5-325 mg per tablet Commonly known as:  Kelly Leyland Take 1 Tab by mouth daily as needed for Pain. insulin lispro  & lisp protamine (human) 100 unit/mL (75-25) Inpn Commonly known as:  HUMALOG 75-25 MIX 55 units in the morning, 50 units in the evening. Inulin-Sorbitol 2 gram Chew Commonly known as:  Fiber Take 2 g by mouth daily. lisinopril 20 mg tablet Commonly known as:  Mily Husbands Take 1 Tab by mouth daily. methocarbamol 750 mg tablet Commonly known as:  [id number] Take 1 Tab by mouth three (3) times daily. metOLazone 5 mg tablet Commonly known as:  Rhea Eric Take 5 mg by mouth daily. MYRBETRIQ 25 mg ER tablet Generic drug:  mirabegron ER Take 25 mg by mouth daily. naproxen 250 mg tablet Commonly known as:  NAPROSYN Take 1 Tab by mouth two (2) times daily (with meals). omeprazole 20 mg capsule Commonly known as:  PRILOSEC  
TAKE ONE CAPSULE BY MOUTH EVERY DAY  
  
 rosuvastatin 40 mg tablet Commonly known as:  CRESTOR Take 1 Tab by mouth nightly. tiotropium 18 mcg inhalation capsule Commonly known as:  [address] Take 1 Cap by inhalation daily. triamcinolone acetonide 0.025 % topical cream  
Commonly known as:  KENALOG VESIcare 10 mg tablet Generic drug:  solifenacin Take 10 mg by mouth daily. ZETIA 10 mg tablet Generic drug:  ezetimibe Take 10 mg by mouth daily. * Notice: This list has 6 medication(s) that are the same as other medications prescribed for you. Read the directions carefully, and ask your doctor or other care provider to review them with you. Prescriptions Printed Refills HYDROcodone-acetaminophen (NORCO) 5-325 mg per tablet 0 Sig: Take 1 Tab by mouth daily as needed for Pain. Class: Print Route: Oral  
  
Prescriptions Sent to Pharmacy Refills  
 cephALEXin (KEFLEX) 500 mg capsule 0 Sig: Take 1 Cap by mouth two (2) times a day for 7 days. Class: Normal  
 Pharmacy: [facility]/pharmacy [address] [facility] [address]  #: [phone] Route: Oral  
  
We Performed the Following HEMOGLOBIN A1C W/O EAG [27398 CPT(R)] METABOLIC PANEL, COMPREHENSIVE [05973 CPT(R)] REFERRAL TO ENDOCRINOLOGY [XBZ97 Custom] Comments:  
 Uncontrolled diabetes. Follow-up Instructions Return in about 4 months (around 8/3/2018) for diabetes, htn, CKD, meds, labs sooner if labs abnormal.  
  
  
Referral Information Referral ID Referred By Referred To  
  
 [id number] [facility], [address] Lori Sherman MD   
   [address] [place] [address], [facility] [place] [facility] Phone: [phone] Fax: [phone] Visits Status Start Date End Date 1 New Request 4/3/18 4/3/19 If your referral has a status of pending review or denied, additional information will be sent to support the outcome of this decision. Introducing [facility]! Angelina Dumas introduces [employer] patient portal. Now you can access parts of your medical record, email your doctor's office, and request medication refills online. 1. In your internet browser, go to [email or web address] 2. Click on the First Time User? Click Here link in the Sign In box. You will see the New Member Sign Up page. 3. Enter your [employer] Access Code exactly as it appears below. You will not need to use this code after youve completed the sign-up process. If you do not sign up before the expiration date, you must request a new code. · [employer] Access Code: [email or web address] Expires: 7/2/2018 10:17 AM 
 
4. Enter the last four digits of your Social Security Number (xxxx) and Date of Birth (mm/dd/yyyy) as indicated and click Submit. You will be taken to the next sign-up page. 5. Create a [employer] ID. This will be your [employer] login ID and cannot be changed, so think of one that is secure and easy to remember. 6. Create a [employer] password. You can change your password at any time. 7. Enter your Password Reset Question and Answer. This can be used at a later time if you forget your password. 8. Enter your e-mail address. You will receive e-mail notification when new information is available in [address] 9. Click Sign Up. You can now view and download portions of your medical record. 10. Click the Download Summary menu link to download a portable copy of your medical information. If you have questions, please visit the Frequently Asked Questions section of the [employer] website. Remember, [employer] is NOT to be used for urgent needs. For medical emergencies, dial 911. Now available from your iPhone and Android! Please provide this summary of care documentation to your next provider. Your primary care clinician is listed as Lulu Batres. If you have any questions after today's visit, please call [phone].

## 2018-04-03 NOTE — PROGRESS NOTES
Chief Complaint   Patient presents with    Diabetes    Hypertension    Skin Problem     4 mo check. Place on chest, bump, draining. 1. Have you been to the ER, urgent care clinic since your last visit? Hospitalized since your last visit? no    2. Have you seen or consulted any other health care providers outside of the [address] [place] [address] since your last visit? Include any pap smears or colon screening. no    Patient going to make appt to see Milena Begum 9/2017, form filled out to get report.

## 2018-04-04 LAB
ALBUMIN SERPL-MCNC: 3.8 G/DL (ref 3.5–4.7)
ALBUMIN/GLOB SERPL: 1.6 {RATIO} (ref 1.2–2.2)
ALP SERPL-CCNC: 81 IU/L (ref 39–117)
ALT SERPL-CCNC: 11 IU/L (ref 0–44)
AST SERPL-CCNC: 11 IU/L (ref 0–40)
BILIRUB SERPL-MCNC: 0.4 MG/DL (ref 0–1.2)
BUN SERPL-MCNC: 21 MG/DL (ref 8–27)
BUN/CREAT SERPL: 17 (ref 10–24)
CALCIUM SERPL-MCNC: 9.5 MG/DL (ref 8.6–10.2)
CHLORIDE SERPL-SCNC: 96 MMOL/L (ref 96–106)
CO2 SERPL-SCNC: 30 MMOL/L (ref 18–29)
CREAT SERPL-MCNC: 1.25 MG/DL (ref 0.76–1.27)
GFR SERPLBLD CREATININE-BSD FMLA CKD-EPI: 53 ML/MIN/1.73
GFR SERPLBLD CREATININE-BSD FMLA CKD-EPI: 62 ML/MIN/1.73
GLOBULIN SER CALC-MCNC: 2.4 G/DL (ref 1.5–4.5)
GLUCOSE SERPL-MCNC: 363 MG/DL (ref 65–99)
HBA1C MFR BLD: 12.9 % (ref 4.8–5.6)
INTERPRETATION: NORMAL
Lab: NORMAL
POTASSIUM SERPL-SCNC: 5.1 MMOL/L (ref 3.5–5.2)
PROT SERPL-MCNC: 6.2 G/DL (ref 6–8.5)
SODIUM SERPL-SCNC: 138 MMOL/L (ref 134–144)

## 2018-05-21 ENCOUNTER — OFFICE VISIT (OUTPATIENT)
Dept: ENDOCRINOLOGY | Age: 83
End: 2018-05-21

## 2018-05-21 VITALS
HEART RATE: 91 BPM | WEIGHT: 212.2 LBS | BODY MASS INDEX: 29.71 KG/M2 | HEIGHT: 71 IN | SYSTOLIC BLOOD PRESSURE: 148 MMHG | DIASTOLIC BLOOD PRESSURE: 78 MMHG

## 2018-05-21 PROBLEM — E11.40 TYPE 2 DIABETES MELLITUS WITH DIABETIC NEUROPATHY (HCC): Status: ACTIVE | Noted: 2018-05-21

## 2018-05-21 NOTE — PROGRESS NOTES
Tamera Villalobos is a 80 y.o. male      Chief Complaint   Patient presents with    Diabetes     Hemoglobin A1c 12.9 % (4/3/18)    Follow-up       1. Have you been to the ER, urgent care clinic since your last visit? Hospitalized since your last visit? No    2. Have you seen or consulted any other health care providers outside of the [address] [place] [address] since your last visit? Include any pap smears or colon screening.  No

## 2018-05-21 NOTE — PATIENT INSTRUCTIONS
PLAN:    1. Take your insulin doses in the following way:    A. Take 50 units of current insulin (75/25) with evening meal (330pm)  B. Take 50 units of new insulin (NPH) in the morning (7am)    2. Check blood sugar in the morning    3. Return to clinic in 5 weeks!

## 2018-05-21 NOTE — PROGRESS NOTES
Chief Complaint   Patient presents with    Diabetes     Hemoglobin A1c 12.9 % (4/3/18)    Follow-up       HPI  This is an 44-year-old white gentleman that we met once in February 2017 who has type 2 diabetes mellitus with poor blood sugar control. At that time he was taking 75/25 insulin twice daily. All of his A1c's have been greater than 12% for at least the last 2 years. He did not make it back for follow-up because lots of things have intervened. His wife is mostly bedbound due to multiple CVAs. She requires 24 7 care. 3 of his great-grandchildren were killed in [place] a month ago. His granddaughter was injured but not killed. He continues to work as a  from 3:30-11:30 PM 5-6 days a week. He says he takes his 75/25 insulin twice daily. Generally he takes the first dose at 9 AM and the second dose at 11:30 PM after work. He eats 1 meal only most days. This is usually before he goes off to work. This can be a cheeseburger and chips. He eats peanut butter crackers and a diet Mountain Dew in the morning and usually in the evening as well. Last night because he did not work, he had steak and baked potato at dinner. He says he took his evening dose of insulin at that time. He did not take a dose this morning and his blood sugar in our office today was 346. ROS  He denies chest pain, shortness of breath, constipation or diarrhea. He does complain of peripheral edema.   Family History   Problem Relation Age of Onset    Heart Disease Mother     Heart Disease Sister      2 Sisters    Diabetes Sister      2 Sisters    Cancer Sister      Lung Cancer    Heart Disease Brother     Diabetes Brother         Social History     Social History    Marital status:      Spouse name: N/A    Number of children: N/A    Years of education: N/A     Social History Main Topics    Smoking status: Former Smoker     Packs/day: 1.50     Years: 62.00     Quit date: 1/27/1999    Smokeless tobacco: Current User    Alcohol use No    Drug use: No    Sexual activity: Yes     Partners: Female      Comment: has chronic martines     Other Topics Concern    None     Social History Narrative        Vitals:    05/21/18 0841   BP: 148/78   Pulse: 91   Weight: 212 lb 3.2 oz (96.3 kg)   Height: 5' 11\" (1.803 m)   PainSc:   0 - No pain        Physical Examination: General appearance - alert, well appearing, and in no distress  Mental status - alert, oriented to person, place, and time  Neck - supple, no significant adenopathy, thyroid exam: thyroid is normal in size without nodules or tenderness  Chest - clear to auscultation, no wheezes, rales or rhonchi, symmetric air entry  Heart - normal rate, regular rhythm, normal S1, S2, no murmurs, rubs, clicks or gallops  Abdomen - soft, nontender, nondistended, no masses or organomegaly  Extremities - pedal edema 3 +, intact peripheral pulses    Diabetic foot exam:     Left: Reflexes 4+     Vibratory sensation diminished    Filament test reduced sensation with micro filament   Pulse DP: 2+ (normal)   Pulse PT: 2+ (normal)   Deformities: None  Right: Reflexes 4+   Vibratory sensation diminished   Filament test reduced sensation with micro filament   Pulse DP: 2+ (normal)   Pulse PT: 2+ (normal)   Deformities: None      ASSESSMENT & PLAN  This gentleman has poorly controlled type 2 diabetes mellitus of long-standing duration on 75/25 insulin taken intermittently. He eats generally at 230 to 3:00 in the afternoon most days. Our plan would be to provide a basal insulin on a more consistent basis. For that reason we have chosen to switch him from 75/25 insulin to NPH insulin twice daily. He does have 7 bands of the 75/25 left so we have asked him to take 50 units of the 75/25 insulin in the evening prior to his evening meal and to take the NPH insulin 50 units in the morning. Our goal is roughly 12 hours apart. I stressed that on many occasions.   He is agreed to come back in 1 month and we can review that. We also encouraged him to check blood sugars. He says there is no battery in the meter and we encouraged him to get a battery. We spent more than 25 mintutes face to face, more than 50% was in counseling regarding diet, exercise and carbohydrate intake.

## 2018-05-21 NOTE — MR AVS SNAPSHOT
[address] [address] [phone] Patient: Naman Arellano MRN: [id number] LCI:8/02/7126 Visit Information Date & Time Provider Department Dept. Phone Encounter #  
 5/21/2018  8:00 AM Annamaria Hood MD [facility] [phone] [id number] Your Appointments 8/3/2018  9:00 AM  
ROUTINE CARE with Soha Kahn MD  
[facility] at [address]) Appt Note: 4m fu  
 [address] [id number]  
[facility] Upcoming Health Maintenance Date Due  
 FOOT EXAM Q1 9/20/1945 EYE EXAM RETINAL OR DILATED Q1 9/20/1945 DTaP/Tdap/Td series (1 - Tdap) 9/20/1956 ZOSTER VACCINE AGE 60> 7/20/1995 GLAUCOMA SCREENING Q2Y 3/16/2018 MICROALBUMIN Q1 5/25/2018 LIPID PANEL Q1 5/25/2018 MEDICARE YEARLY EXAM 5/26/2018 Influenza Age 5 to Adult 8/1/2018 HEMOGLOBIN A1C Q6M 10/3/2018 Allergies as of 5/21/2018  Review Complete On: 5/21/2018 By: Marco A Calhoun No Known Allergies Current Immunizations  Reviewed on 9/26/2017 Name Date Influenza High Dose Vaccine PF 9/26/2017, 9/14/2016 Influenza Vaccine Whole 9/28/2010 ZZZ-RETIRED (DO NOT USE) Pneumococcal Vaccine (Unspecified Type) 11/28/2010  9:00 PM  
  
 Not reviewed this visit You Were Diagnosed With   
  
 Codes Comments Uncontrolled type 2 diabetes mellitus with diabetic polyneuropathy, with long-term current use of insulin (HCC)    -  Primary ICD-10-CM: E11.42, Z79.4, E11.65 ICD-9-CM: 250.62, 357.2, V58.67 Vitals BP Pulse Height(growth percentile) Weight(growth percentile) BMI Smoking Status 148/78 (BP 1 Location: Right arm, BP Patient Position: Sitting) 91 5' 11\" (1.803 m) 212 lb 3.2 oz (96.3 kg) 29.6 kg/m2 Former Smoker Vitals History BMI and BSA Data Body Mass Index Body Surface Area  
 29.6 kg/m 2 2.2 m 2 Preferred Pharmacy Pharmacy Name Phone [id number]- [place], [place] - [address] [phone] Your Updated Medication List  
  
   
This list is accurate as of 5/21/18  9:20 AM.  Always use your most recent med list.  
  
  
  
  
 * albuterol 90 mcg/actuation inhaler Commonly known as:  PROVENTIL HFA, VENTOLIN HFA, PROAIR HFA Take 2 puffs by inhalation every four (4) hours as needed for Wheezing. * albuterol 1.25 mg/3 mL Nebu Commonly known as:  Garon Herndon Take 3 mL by inhalation every four (4) hours as needed for Wheezing (wheezing). * Ascensia CONTOUR strip Generic drug:  glucose blood VI test strips MONITOR BLOOD SUGAR TWICE DAILY * glucose blood VI test strips strip Commonly known as:  ACCU-CHEK AGATHA Monitor blood sugar twice daily. ICD 10 Code: E11.65  
  
 aspirin 81 mg chewable tablet Take 81 mg by mouth daily. * Blood-Glucose Meter monitoring kit Commonly known as:  CONTOUR METER Monitor BS twice daily * Blood-Glucose Meter Misc Commonly known as:  ACCU-CHEK AGATHA PLUS METER Monitor blood sugar twice daily. ICD 10 Code: E11.65  
  
 bumetanide 2 mg tablet Commonly known as:  Tura Rico Take 1 Tab by mouth daily as needed. citalopram 20 mg tablet Commonly known as:  CELEXA  
TAKE 1 TABLET BY MOUTH EVERY MORNING AS NEEDED FOR ANXIETY DEPPRESSION  
  
 desonide 0.05 % topical ointment Commonly known as:  Marnell Moan Apply  to affected area two (2) times a day. ergocalciferol 50,000 unit capsule Commonly known as:  ERGOCALCIFEROL Take 1 Cap by mouth every seven (7) days. FISH -160-1,000 mg Cap Generic drug:  omega 3-dha-epa-fish oil Take  by mouth. fluticasone furoate 200 mcg/actuation Dsdv inhaler Commonly known as:  ARNUITY ELLIPTA Take 1 Puff by inhalation daily. gabapentin 300 mg capsule Commonly known as:  NEURONTIN  
TAKE 1 CAPSULE BY MOUTH EVERY NIGHT HYDROcodone-acetaminophen 5-325 mg per tablet Commonly known as:  Ruffus Homme Take 1 Tab by mouth daily as needed for Pain. insulin  unit/mL (3 mL) Inpn Commonly known as:  HumuLIN N NPH Insulin KwikPen 50 Units by SubCUTAneous route two (2) times a day. Inulin-Sorbitol 2 gram Chew Commonly known as:  Fiber Take 2 g by mouth daily. lisinopril 20 mg tablet Commonly known as:  Burma Fairy Take 1 Tab by mouth daily. methocarbamol 750 mg tablet Commonly known as:  [id number] Take 1 Tab by mouth three (3) times daily. metOLazone 5 mg tablet Commonly known as:  [place] Take 5 mg by mouth daily. MYRBETRIQ 25 mg ER tablet Generic drug:  mirabegron ER Take 25 mg by mouth daily. naproxen 250 mg tablet Commonly known as:  NAPROSYN Take 1 Tab by mouth two (2) times daily (with meals). omeprazole 20 mg capsule Commonly known as:  PRILOSEC  
TAKE ONE CAPSULE BY MOUTH EVERY DAY  
  
 rosuvastatin 40 mg tablet Commonly known as:  CRESTOR Take 1 Tab by mouth nightly. tiotropium 18 mcg inhalation capsule Commonly known as:  [address] Take 1 Cap by inhalation daily. triamcinolone acetonide 0.025 % topical cream  
Commonly known as:  KENALOG  
  
 VESIcare 10 mg tablet Generic drug:  solifenacin Take 10 mg by mouth daily. ZETIA 10 mg tablet Generic drug:  ezetimibe Take 10 mg by mouth daily. * Notice: This list has 6 medication(s) that are the same as other medications prescribed for you. Read the directions carefully, and ask your doctor or other care provider to review them with you. Prescriptions Sent to Pharmacy Refills  
 insulin NPH (HUMULIN N NPH INSULIN KWIKPEN) 100 unit/mL (3 mL) inpn 10 Si Units by SubCUTAneous route two (2) times a day. Class: Normal  
 Pharmacy: [facility] [address] [facility] [address]  #: [phone] Route: SubCUTAneous We Performed the Following  DIABETES FOOT EXAM [[facility] Custom] Patient Instructions PLAN: 
 
1. Take your insulin doses in the following way: A. Take 50 units of current insulin (75/25) with evening meal (330pm) B. Take 50 units of new insulin (NPH) in the morning (7am) 2. Check blood sugar in the morning 3. Return to clinic in 5 weeks! Introducing [facility] & HEALTH SERVICES! Lior Ramos introduces [employer] patient portal. Now you can access parts of your medical record, email your doctor's office, and request medication refills online. 1. In your internet browser, go to [email or web address] 2. Click on the First Time User? Click Here link in the Sign In box. You will see the New Member Sign Up page. 3. Enter your [employer] Access Code exactly as it appears below. You will not need to use this code after youve completed the sign-up process. If you do not sign up before the expiration date, you must request a new code. · [employer] Access Code: [email or web address] Expires: 7/2/2018 10:17 AM 
 
4. Enter the last four digits of your Social Security Number (xxxx) and Date of Birth (mm/dd/yyyy) as indicated and click Submit. You will be taken to the next sign-up page. 5. Create a [employer] ID. This will be your [employer] login ID and cannot be changed, so think of one that is secure and easy to remember. 6. Create a [employer] password. You can change your password at any time. 7. Enter your Password Reset Question and Answer. This can be used at a later time if you forget your password. 8. Enter your e-mail address. You will receive e-mail notification when new information is available in [address] 9. Click Sign Up. You can now view and download portions of your medical record. 10. Click the Download Summary menu link to download a portable copy of your medical information. If you have questions, please visit the Frequently Asked Questions section of the [employer] website. Remember, [employer] is NOT to be used for urgent needs. For medical emergencies, dial 911. Now available from your iPhone and Android! Please provide this summary of care documentation to your next provider. Your primary care clinician is listed as Castro Gilliam. If you have any questions after today's visit, please call [phone].

## 2018-05-23 ENCOUNTER — TELEPHONE (OUTPATIENT)
Dept: ENDOCRINOLOGY | Age: 83
End: 2018-05-23

## 2018-05-23 NOTE — TELEPHONE ENCOUNTER
Patient called to say that he would be coming by  tomorrow morning to  a One Touch 2 Meter. His meter stopped working few days ago. He can be reached at:  [phone].

## 2018-06-21 DIAGNOSIS — E11.42 PERIPHERAL SENSORY NEUROPATHY DUE TO TYPE 2 DIABETES MELLITUS (HCC): ICD-10-CM

## 2018-06-21 RX ORDER — HYDROCODONE BITARTRATE AND ACETAMINOPHEN 5; 325 MG/1; MG/1
1 TABLET ORAL
Qty: 25 TAB | Refills: 0 | Status: SHIPPED | OUTPATIENT
Start: 2018-06-21 | End: 2018-08-16 | Stop reason: SDUPTHER

## 2018-06-21 NOTE — TELEPHONE ENCOUNTER
Pt would like a refill: HYDROcodone-acetaminophen (NORCO) 5-325 mg per tablet     For back pain     Best number to reach him is [phone]

## 2018-07-10 ENCOUNTER — TELEPHONE (OUTPATIENT)
Dept: FAMILY MEDICINE CLINIC | Age: 83
End: 2018-07-10

## 2018-07-10 NOTE — TELEPHONE ENCOUNTER
Chandler Devries w/[facility] would like pt injection fraction       Best number to reach her is [phone]  Ext [phone] - confidential VM, a message can be left if she doesn't answer

## 2018-08-06 DIAGNOSIS — E11.42 PERIPHERAL SENSORY NEUROPATHY DUE TO TYPE 2 DIABETES MELLITUS (HCC): ICD-10-CM

## 2018-08-06 RX ORDER — HYDROCODONE BITARTRATE AND ACETAMINOPHEN 5; 325 MG/1; MG/1
1 TABLET ORAL
Qty: 25 TAB | Refills: 0 | OUTPATIENT
Start: 2018-08-06

## 2018-08-06 NOTE — TELEPHONE ENCOUNTER
Jaimie, we did this extra one for him last month. He missed an appointment with us last week.       It's supposed to be q3mths, it's been > 4    Controlled substance needs document that he's seen    aleciax    Huma Russell MD  8/6/2018

## 2018-08-06 NOTE — TELEPHONE ENCOUNTER
Patient called, stating he needs a new rx for hydrocodone  5/325    Please call pt when ready for     [phone]

## 2018-08-16 ENCOUNTER — OFFICE VISIT (OUTPATIENT)
Dept: FAMILY MEDICINE CLINIC | Age: 83
End: 2018-08-16

## 2018-08-16 VITALS
SYSTOLIC BLOOD PRESSURE: 131 MMHG | HEART RATE: 85 BPM | BODY MASS INDEX: 30.02 KG/M2 | DIASTOLIC BLOOD PRESSURE: 71 MMHG | WEIGHT: 214.4 LBS | TEMPERATURE: 97.5 F | HEIGHT: 71 IN | RESPIRATION RATE: 18 BRPM | OXYGEN SATURATION: 92 %

## 2018-08-16 DIAGNOSIS — R39.9 LOWER URINARY TRACT SYMPTOMS (LUTS): ICD-10-CM

## 2018-08-16 DIAGNOSIS — N18.30 CKD (CHRONIC KIDNEY DISEASE) STAGE 3, GFR 30-59 ML/MIN (HCC): ICD-10-CM

## 2018-08-16 DIAGNOSIS — G89.4 CHRONIC PAIN SYNDROME: ICD-10-CM

## 2018-08-16 DIAGNOSIS — E11.42 PERIPHERAL SENSORY NEUROPATHY DUE TO TYPE 2 DIABETES MELLITUS (HCC): ICD-10-CM

## 2018-08-16 DIAGNOSIS — Z79.899 ENCOUNTER FOR LONG-TERM CURRENT USE OF MEDICATION: ICD-10-CM

## 2018-08-16 DIAGNOSIS — I10 ESSENTIAL HYPERTENSION WITH GOAL BLOOD PRESSURE LESS THAN 140/90: ICD-10-CM

## 2018-08-16 DIAGNOSIS — Z00.00 MEDICARE ANNUAL WELLNESS VISIT, SUBSEQUENT: Primary | ICD-10-CM

## 2018-08-16 DIAGNOSIS — Z91.81 RISK FOR FALLS: ICD-10-CM

## 2018-08-16 RX ORDER — HYDROCODONE BITARTRATE AND ACETAMINOPHEN 5; 325 MG/1; MG/1
1 TABLET ORAL
Qty: 30 TAB | Refills: 0 | Status: SHIPPED | OUTPATIENT
Start: 2018-08-16 | End: 2018-12-04 | Stop reason: SDUPTHER

## 2018-08-16 RX ORDER — HYDROCODONE BITARTRATE AND ACETAMINOPHEN 5; 325 MG/1; MG/1
1 TABLET ORAL
Qty: 25 TAB | Refills: 0 | Status: SHIPPED | OUTPATIENT
Start: 2018-08-16 | End: 2018-08-16 | Stop reason: SDUPTHER

## 2018-08-16 RX ORDER — TOBRAMYCIN AND DEXAMETHASONE 3; 1 MG/ML; MG/ML
SUSPENSION/ DROPS OPHTHALMIC
Refills: 0 | COMMUNITY
Start: 2018-08-10 | End: 2019-01-01

## 2018-08-16 NOTE — MR AVS SNAPSHOT
[address] [facility] [address] [facility] 
[phone] Patient: Oliva Franklin MRN: [id number] RML:4/99/1940 Visit Information Date & Time Provider Department Dept. Phone Encounter #  
 8/16/2018  8:40 AM Yudith Boykin MD [facility] at [address] [place] [address] Road [id number] Follow-up Instructions Return in about 3 months (around 11/16/2018) for meds refills, pain. Upcoming Health Maintenance Date Due DTaP/Tdap/Td series (1 - Tdap) 9/20/1956 ZOSTER VACCINE AGE 60> 7/20/1995 MICROALBUMIN Q1 5/25/2018 LIPID PANEL Q1 5/25/2018 MEDICARE YEARLY EXAM 5/26/2018 Influenza Age 5 to Adult 8/1/2018 HEMOGLOBIN A1C Q6M 10/3/2018 EYE EXAM RETINAL OR DILATED Q1 10/27/2018 FOOT EXAM Q1 5/21/2019 GLAUCOMA SCREENING Q2Y 10/27/2019 Allergies as of 8/16/2018  Review Complete On: 8/16/2018 By: Yudith Boykin MD  
 No Known Allergies Current Immunizations  Reviewed on 9/26/2017 Name Date Influenza High Dose Vaccine PF 9/26/2017, 9/14/2016 Influenza Vaccine Whole 9/28/2010 ZZZ-RETIRED (DO NOT USE) Pneumococcal Vaccine (Unspecified Type) 11/28/2010  9:00 PM  
  
 Not reviewed this visit You Were Diagnosed With   
  
 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 
ICD-9-CM: 250.60, 356.2 Chronic pain syndrome     ICD-10-CM: G89.4 ICD-9-CM: 338.4 Essential hypertension with goal blood pressure less than 140/90     ICD-10-CM: I10 
ICD-9-CM: 401.9 CKD (chronic kidney disease) stage 3, GFR 30-59 ml/min     ICD-10-CM: N18.3 ICD-9-CM: 272. 3 Lower urinary tract symptoms (LUTS)     ICD-10-CM: R39.9 ICD-9-CM: 788.99  Uncontrolled type 2 diabetes mellitus with diabetic nephropathy, with long-term current use of insulin (HCC)     ICD-10-CM: E11.21, E11.65, Z79.4 ICD-9-CM: 250.42, 583.81, V58.67 Encounter for long-term current use of medication     ICD-10-CM: Z79.899 ICD-9-CM: V58.69 Risk for falls     ICD-10-CM: Z91.81 
ICD-9-CM: V15.88 Vitals BP Pulse Temp Resp Height(growth percentile) Weight(growth percentile) 131/71 (BP 1 Location: Left arm, BP Patient Position: Sitting) 85 97.5 °F (36.4 °C) (Oral) 18 5' 11\" (1.803 m) 214 lb 6.4 oz (97.3 kg) SpO2 BMI Smoking Status 92% 29.9 kg/m2 Former Smoker Vitals History BMI and BSA Data Body Mass Index Body Surface Area  
 29.9 kg/m 2 2.21 m 2 Preferred Pharmacy Pharmacy Name Phone [id number]- [place], [place] - [address] [phone] Your Updated Medication List  
  
   
This list is accurate as of 8/16/18 12:21 PM.  Always use your most recent med list.  
  
  
  
  
 * albuterol 90 mcg/actuation inhaler Commonly known as:  PROVENTIL HFA, VENTOLIN HFA, PROAIR HFA Take 2 puffs by inhalation every four (4) hours as needed for Wheezing. * albuterol 1.25 mg/3 mL Nebu Commonly known as:  Marvin Diss Take 3 mL by inhalation every four (4) hours as needed for Wheezing (wheezing). * Ascensia CONTOUR strip Generic drug:  glucose blood VI test strips MONITOR BLOOD SUGAR TWICE DAILY * glucose blood VI test strips strip Commonly known as:  ACCU-CHEK AGATHA Monitor blood sugar twice daily. ICD 10 Code: E11.65  
  
 aspirin 81 mg chewable tablet Take 81 mg by mouth daily. * Blood-Glucose Meter monitoring kit Commonly known as:  CONTOUR METER Monitor BS twice daily * Blood-Glucose Meter Misc Commonly known as:  ACCU-CHEK AGATHA PLUS METER Monitor blood sugar twice daily. ICD 10 Code: E11.65  
  
 bumetanide 2 mg tablet Commonly known as:  Nadeem Hy Take 1 Tab by mouth daily as needed. citalopram 20 mg tablet Commonly known as:  CELEXA  
TAKE 1 TABLET BY MOUTH EVERY MORNING AS NEEDED FOR ANXIETY DEPPRESSION  
  
 desonide 0.05 % topical ointment Commonly known as:  Irl Idler Apply  to affected area two (2) times a day. ergocalciferol 50,000 unit capsule Commonly known as:  ERGOCALCIFEROL Take 1 Cap by mouth every seven (7) days. FISH -160-1,000 mg Cap Generic drug:  omega 3-dha-epa-fish oil Take  by mouth. fluticasone furoate 200 mcg/actuation Dsdv inhaler Commonly known as:  ARNUITY ELLIPTA Take 1 Puff by inhalation daily. gabapentin 300 mg capsule Commonly known as:  NEURONTIN  
TAKE 1 CAPSULE BY MOUTH EVERY NIGHT HYDROcodone-acetaminophen 5-325 mg per tablet Commonly known as:  Mary Carmen Vickers Take 1 Tab by mouth daily as needed for Pain. insulin  unit/mL (3 mL) Inpn Commonly known as:  HumuLIN N NPH Insulin KwikPen 50 Units by SubCUTAneous route two (2) times a day. Inulin-Sorbitol 2 gram Chew Commonly known as:  Fiber Take 2 g by mouth daily. lisinopril 20 mg tablet Commonly known as:  Alexandro Alexa Take 1 Tab by mouth daily. methocarbamol 750 mg tablet Commonly known as:  CIZRNJX-355 Take 1 Tab by mouth three (3) times daily. metOLazone 5 mg tablet Commonly known as:  Antonioian Flash Take 5 mg by mouth daily. MYRBETRIQ 25 mg ER tablet Generic drug:  mirabegron ER Take 25 mg by mouth daily. naproxen 250 mg tablet Commonly known as:  NAPROSYN Take 1 Tab by mouth two (2) times daily (with meals). omeprazole 20 mg capsule Commonly known as:  PRILOSEC  
TAKE ONE CAPSULE BY MOUTH EVERY DAY  
  
 rosuvastatin 40 mg tablet Commonly known as:  CRESTOR Take 1 Tab by mouth nightly. tiotropium 18 mcg inhalation capsule Commonly known as:  [address] [place] [address] Drive Take 1 Cap by inhalation daily. tobramycin-dexamethasone ophthalmic suspension Commonly known as:  Kim Vazquez INSTILL 1 DROP IN BOTH EYES 4 TIMES A DAY FOR 5 DAYS  
  
 triamcinolone acetonide 0.025 % topical cream  
Commonly known as:  KENALOG  
  
 VESIcare 10 mg tablet Generic drug:  solifenacin Take 10 mg by mouth daily. ZETIA 10 mg tablet Generic drug:  ezetimibe Take 10 mg by mouth daily. * Notice: This list has 6 medication(s) that are the same as other medications prescribed for you. Read the directions carefully, and ask your doctor or other care provider to review them with you. Prescriptions Printed Refills HYDROcodone-acetaminophen (NORCO) 5-325 mg per tablet 0 Sig: Take 1 Tab by mouth daily as needed for Pain. Class: Print Route: Oral  
  
We Performed the Following CBC W/O DIFF [73059 CPT(R)] HEMOGLOBIN A1C W/O EAG [46364 CPT(R)] LIPID PANEL [37752 CPT(R)] METABOLIC PANEL, COMPREHENSIVE [61935 CPT(R)] MICROALBUMIN, UR, RAND W/ MICROALB/CREAT RATIO [id number] CPT(R)] REFERRAL TO PHYSICAL THERAPY [TJI42 Custom] Comments:  
 Pt request near where he lives. Risk for fall, balance REFERRAL TO UROLOGY [EVB149 Custom] TSH 3RD GENERATION [10838 CPT(R)] Follow-up Instructions Return in about 3 months (around 11/16/2018) for meds refills, pain. Referral Information Referral ID Referred By Referred To  
  
 [id number] Philip Howell MD   
   [address], [facility] Phone: [phone] Fax: [phone] Visits Status Start Date End Date 1 New Request 8/16/18 8/16/19 If your referral has a status of pending review or denied, additional information will be sent to support the outcome of this decision. Referral ID Referred By Referred To  
 [id number] Shell Countess Not Available Visits Status Start Date End Date 1 New Request 8/16/18 8/16/19 If your referral has a status of pending review or denied, additional information will be sent to support the outcome of this decision. Introducing [facility]! [facility] introduces [employer] patient portal. Now you can access parts of your medical record, email your doctor's office, and request medication refills online. 1. In your internet browser, go to [email or web address] 2. Click on the First Time User? Click Here link in the Sign In box. You will see the New Member Sign Up page. 3. Enter your [employer] Access Code exactly as it appears below. You will not need to use this code after youve completed the sign-up process. If you do not sign up before the expiration date, you must request a new code. · [employer]akan Access Code: [email or web address] Expires: 11/14/2018 11:08 AM 
 
4. Enter the last four digits of your Social Security Number (xxxx) and Date of Birth (mm/dd/yyyy) as indicated and click Submit. You will be taken to the next sign-up page. 5. Create a [employer]akan ID. This will be your [employer]akan login ID and cannot be changed, so think of one that is secure and easy to remember. 6. Create a [employer]akan password. You can change your password at any time. 7. Enter your Password Reset Question and Answer. This can be used at a later time if you forget your password. 8. Enter your e-mail address. You will receive e-mail notification when new information is available in [address]. 9. Click Sign Up. You can now view and download portions of your medical record. 10. Click the Download Summary menu link to download a portable copy of your medical information. If you have questions, please visit the Frequently Asked Questions section of the [employer] website. Remember, [employer]akan is NOT to be used for urgent needs. For medical emergencies, dial 911. Now available from your iPhone and Android! Please provide this summary of care documentation to your next provider. Your primary care clinician is listed as Guerline Hayden. If you have any questions after today's visit, please call [phone].
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
## 2018-08-17 LAB
ALBUMIN SERPL-MCNC: 4 G/DL (ref 3.5–4.7)
ALBUMIN/CREAT UR: 2480.4 MG/G CREAT (ref 0–30)
ALBUMIN/GLOB SERPL: 1.6 {RATIO} (ref 1.2–2.2)
ALP SERPL-CCNC: 98 IU/L (ref 39–117)
ALT SERPL-CCNC: 10 IU/L (ref 0–44)
AST SERPL-CCNC: 12 IU/L (ref 0–40)
BILIRUB SERPL-MCNC: 0.3 MG/DL (ref 0–1.2)
BUN SERPL-MCNC: 22 MG/DL (ref 8–27)
BUN/CREAT SERPL: 17 (ref 10–24)
CALCIUM SERPL-MCNC: 9.5 MG/DL (ref 8.6–10.2)
CHLORIDE SERPL-SCNC: 94 MMOL/L (ref 96–106)
CHOLEST SERPL-MCNC: 301 MG/DL (ref 100–199)
CO2 SERPL-SCNC: 25 MMOL/L (ref 20–29)
CREAT SERPL-MCNC: 1.3 MG/DL (ref 0.76–1.27)
CREAT UR-MCNC: 40.8 MG/DL
ERYTHROCYTE [DISTWIDTH] IN BLOOD BY AUTOMATED COUNT: 13.4 % (ref 12.3–15.4)
GLOBULIN SER CALC-MCNC: 2.5 G/DL (ref 1.5–4.5)
GLUCOSE SERPL-MCNC: 400 MG/DL (ref 65–99)
HBA1C MFR BLD: 13.5 % (ref 4.8–5.6)
HCT VFR BLD AUTO: 45.6 % (ref 37.5–51)
HDLC SERPL-MCNC: 47 MG/DL
HGB BLD-MCNC: 15.3 G/DL (ref 13–17.7)
INTERPRETATION: NORMAL
LDLC SERPL CALC-MCNC: 226 MG/DL (ref 0–99)
Lab: NORMAL
MCH RBC QN AUTO: 31.1 PG (ref 26.6–33)
MCHC RBC AUTO-ENTMCNC: 33.6 G/DL (ref 31.5–35.7)
MCV RBC AUTO: 93 FL (ref 79–97)
MICROALBUMIN UR-MCNC: 1012 UG/ML
PLATELET # BLD AUTO: 277 X10E3/UL (ref 150–379)
POTASSIUM SERPL-SCNC: 5.5 MMOL/L (ref 3.5–5.2)
PROT SERPL-MCNC: 6.5 G/DL (ref 6–8.5)
RBC # BLD AUTO: 4.92 X10E6/UL (ref 4.14–5.8)
SODIUM SERPL-SCNC: 136 MMOL/L (ref 134–144)
TRIGL SERPL-MCNC: 142 MG/DL (ref 0–149)
TSH SERPL DL<=0.005 MIU/L-ACNC: 2.98 UIU/ML (ref 0.45–4.5)
VLDLC SERPL CALC-MCNC: 28 MG/DL (ref 5–40)
WBC # BLD AUTO: 9.2 X10E3/UL (ref 3.4–10.8)

## 2018-08-24 DIAGNOSIS — R60.0 PEDAL EDEMA: ICD-10-CM

## 2018-08-24 RX ORDER — BUMETANIDE 2 MG/1
2 TABLET ORAL
Qty: 90 TAB | Refills: 1 | Status: SHIPPED | OUTPATIENT
Start: 2018-08-24

## 2018-08-24 NOTE — TELEPHONE ENCOUNTER
Pt wants a refill for fluid medication   He could not remember the name     Best number to reach him is [phone]

## 2018-12-04 ENCOUNTER — OFFICE VISIT (OUTPATIENT)
Dept: FAMILY MEDICINE CLINIC | Age: 83
End: 2018-12-04

## 2018-12-04 VITALS
TEMPERATURE: 97.8 F | WEIGHT: 213 LBS | HEART RATE: 90 BPM | RESPIRATION RATE: 18 BRPM | SYSTOLIC BLOOD PRESSURE: 147 MMHG | HEIGHT: 71 IN | DIASTOLIC BLOOD PRESSURE: 74 MMHG | OXYGEN SATURATION: 92 % | BODY MASS INDEX: 29.82 KG/M2

## 2018-12-04 DIAGNOSIS — Z79.899 ENCOUNTER FOR LONG-TERM CURRENT USE OF MEDICATION: ICD-10-CM

## 2018-12-04 DIAGNOSIS — I10 ESSENTIAL HYPERTENSION WITH GOAL BLOOD PRESSURE LESS THAN 140/90: ICD-10-CM

## 2018-12-04 DIAGNOSIS — Z23 ENCOUNTER FOR IMMUNIZATION: Primary | ICD-10-CM

## 2018-12-04 DIAGNOSIS — N18.30 CKD (CHRONIC KIDNEY DISEASE) STAGE 3, GFR 30-59 ML/MIN (HCC): ICD-10-CM

## 2018-12-04 DIAGNOSIS — E11.21 TYPE 2 DIABETES MELLITUS WITH NEPHROPATHY (HCC): ICD-10-CM

## 2018-12-04 DIAGNOSIS — E11.42 PERIPHERAL SENSORY NEUROPATHY DUE TO TYPE 2 DIABETES MELLITUS (HCC): ICD-10-CM

## 2018-12-04 RX ORDER — HYDROCODONE BITARTRATE AND ACETAMINOPHEN 5; 325 MG/1; MG/1
1 TABLET ORAL
Qty: 30 TAB | Refills: 0 | Status: SHIPPED | OUTPATIENT
Start: 2018-12-04 | End: 2019-01-01 | Stop reason: SDUPTHER

## 2018-12-04 NOTE — PROGRESS NOTES
Fluad 0.5 ml, IM given, per Order from Dr. Kell Gaona on 12/4/2018 due to need for immunization. Monica Maya is a 80 y.o. male who presents for routine immunizations. He denies any symptoms , reactions or allergies that would exclude them from being immunized today. Risks and adverse reactions were discussed and the VIS was given to them. All questions were addressed. He was observed for 15 min post injection. There were no reactions observed.  
 
Skinny Schultz LPN

## 2018-12-04 NOTE — PATIENT INSTRUCTIONS
Vaccine Information Statement Influenza (Flu) Vaccine (Inactivated or Recombinant): What you need to know Many Vaccine Information Statements are available in Welsh and other languages. See www.immunize.org/vis Hojas de Información Sobre Vacunas están disponibles en Español y en muchos otros idiomas. Visite www.immunize.org/vis 1. Why get vaccinated? Influenza (flu) is a contagious disease that spreads around the [place] every year, usually between October and May. Flu is caused by influenza viruses, and is spread mainly by coughing, sneezing, and close contact. Anyone can get flu. Flu strikes suddenly and can last several days. Symptoms vary by age, but can include: 
 fever/chills  sore throat  muscle aches  fatigue  cough  headache  runny or stuffy nose Flu can also lead to pneumonia and blood infections, and cause diarrhea and seizures in children. If you have a medical condition, such as heart or lung disease, flu can make it worse. Flu is more dangerous for some people. Infants and young children, people 72years of age and older, pregnant women, and people with certain health conditions or a weakened immune system are at greatest risk. Each year thousands of people in the [facility] die from flu, and many more are hospitalized. Flu vaccine can: 
 keep you from getting flu, 
 make flu less severe if you do get it, and 
 keep you from spreading flu to your family and other people. 2. Inactivated and recombinant flu vaccines A dose of flu vaccine is recommended every flu season. Children 6 months through 6years of age may need two doses during the same flu season. Everyone else needs only one dose each flu season.   
 
 
Some inactivated flu vaccines contain a very small amount of a mercury-based preservative called thimerosal. Studies have not shown thimerosal in vaccines to be harmful, but flu vaccines that do not contain thimerosal are available. There is no live flu virus in flu shots. They cannot cause the flu. There are many flu viruses, and they are always changing. Each year a new flu vaccine is made to protect against three or four viruses that are likely to cause disease in the upcoming flu season. But even when the vaccine doesnt exactly match these viruses, it may still provide some protection Flu vaccine cannot prevent: 
 flu that is caused by a virus not covered by the vaccine, or 
 illnesses that look like flu but are not. It takes about 2 weeks for protection to develop after vaccination, and protection lasts through the flu season. 3. Some people should not get this vaccine Tell the person who is giving you the vaccine:  If you have any severe, life-threatening allergies. If you ever had a life-threatening allergic reaction after a dose of flu vaccine, or have a severe allergy to any part of this vaccine, you may be advised not to get vaccinated. Most, but not all, types of flu vaccine contain a small amount of egg protein.  If you ever had Guillain-Barré Syndrome (also called GBS). Some people with a history of GBS should not get this vaccine. This should be discussed with your doctor.  If you are not feeling well. It is usually okay to get flu vaccine when you have a mild illness, but you might be asked to come back when you feel better. 4. Risks of a vaccine reaction With any medicine, including vaccines, there is a chance of reactions. These are usually mild and go away on their own, but serious reactions are also possible. Most people who get a flu shot do not have any problems with it. Minor problems following a flu shot include:  
 soreness, redness, or swelling where the shot was given  hoarseness  sore, red or itchy eyes  cough  fever  aches  headache  itching  fatigue If these problems occur, they usually begin soon after the shot and last 1 or 2 days. More serious problems following a flu shot can include the following:  There may be a small increased risk of Guillain-Barré Syndrome (GBS) after inactivated flu vaccine. This risk has been estimated at 1 or 2 additional cases per million people vaccinated. This is much lower than the risk of severe complications from flu, which can be prevented by flu vaccine.  Young children who get the flu shot along with pneumococcal vaccine (PCV13) and/or DTaP vaccine at the same time might be slightly more likely to have a seizure caused by fever. Ask your doctor for more information. Tell your doctor if a child who is getting flu vaccine has ever had a seizure. Problems that could happen after any injected vaccine:  People sometimes faint after a medical procedure, including vaccination. Sitting or lying down for about 15 minutes can help prevent fainting, and injuries caused by a fall. Tell your doctor if you feel dizzy, or have vision changes or ringing in the ears.  Some people get severe pain in the shoulder and have difficulty moving the arm where a shot was given. This happens very rarely.  Any medication can cause a severe allergic reaction. Such reactions from a vaccine are very rare, estimated at about 1 in a million doses, and would happen within a few minutes to a few hours after the vaccination. As with any medicine, there is a very remote chance of a vaccine causing a serious injury or death. The safety of vaccines is always being monitored. For more information, visit: www.cdc.gov/vaccinesafety/ 
 
5. What if there is a serious reaction? What should I look for?  Look for anything that concerns you, such as signs of a severe allergic reaction, very high fever, or unusual behavior.  
 
Signs of a severe allergic reaction can include hives, swelling of the face and throat, difficulty breathing, a fast heartbeat, dizziness, and weakness  usually within a few minutes to a few hours after the vaccination. What should I do?  If you think it is a severe allergic reaction or other emergency that cant wait, call 9-1-1 and get the person to the nearest hospital. Otherwise, call your doctor.  Reactions should be reported to the Vaccine Adverse Event Reporting System (VAERS). Your doctor should file this report, or you can do it yourself through  the VAERS web site at www.vaers. [facility].gov, or by calling 5-153.204.9355. VAERS does not give medical advice. 6. The National Vaccine Injury Compensation Program 
 
The [facility] Vaccine Injury Compensation Program (VICP) is a federal program that was created to compensate people who may have been injured by certain vaccines. Persons who believe they may have been injured by a vaccine can learn about the program and about filing a claim by calling 7-185.667.5328 or visiting the [facility] website at www.[facility].gov/vaccinecompensation. There is a time limit to file a claim for compensation. 7. How can I learn more?  Ask your healthcare provider. He or she can give you the vaccine package insert or suggest other sources of information.  Call your local or state health department.  Contact the Centers for Disease Control and Prevention (CDC): 
- Call 5-826.371.2481 (1-800-CDC-INFO) or 
- Visit CDCs website at www.cdc.gov/flu Vaccine Information Statement Inactivated Influenza Vaccine 8/7/2015 
42 LEORA Russell [id number] Department of [facility] and [facility] Centers for Disease Control and Prevention Office Use Only

## 2018-12-04 NOTE — PROGRESS NOTES
Derick Westfall is a 80 y.o. male 
 
 has a past medical history of Abnormal prostate exam, ACP (advance care planning), Arthritis, Asbestosis(501), CAD (coronary artery disease), Calculus of kidney, Chronic pain syndrome, CKD (chronic kidney disease) stage 3, GFR 30-59 ml/min (HCC), CKD (chronic kidney disease) stage 3, GFR 30-59 ml/min (HCC), Congestive heart failure ([facility] COPD, Depression, Essential hypertension with goal blood pressure less than 140/90, GERD (gastroesophageal reflux disease), Heart failure ([facility] Mild intermittent asthma with acute exacerbation, Other \"heavy-for-dates\" infants, Other ill-defined conditions(799.89), PUD (peptic ulcer disease), Uncontrolled type II diabetes mellitus ([facility] and Urinary incontinence. His wife passed away on Thanks Giving. She had broken her leg before that, then s/p surgery, then heart failure while in rehab. His children are out of state. He lives alone. Risk for fall talks about his work. 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 He says they are around him. He haven't fallen. He have been too busy for PT. We'll do another referral, told him to go down stairs today to get in program.  
  
DM2: he needs to f/u with dr. Meenu Pantoja again. A1C 08/2018 13.5%.  He doesn't routinely check his BG or keep logs book. We'll do another refer to endo.  70/30 to 55u am and 50u pm.   
Current NPH 50u am and 55u dinner.  
   
CKD with nephropathy. Will get US renal. Refer to Nephro. I suspect due to his prolonged uncontrolled DM2. Chronic pain and Diabetic neuropathy bilat feet, norco 5/325mg #25 with a refill will last him 4 months. He only takes them seldomly and PRN for pain from his neuropathy. Discussed side effect and potential for drowsiness with norco and not to drive with it.  
   
HTN: BP is at goal today, continue as is. Reviewed: active problem list, medication list, allergies, notes from last encounter, lab results A comprehensive review of systems was negative except for that written in the HPI. No Known Allergies Current Outpatient Medications on File Prior to Visit Medication Sig Dispense Refill  bumetanide (BUMEX) 2 mg tablet Take 1 Tab by mouth daily as needed. 90 Tab 1  
 tobramycin-dexamethasone (TOBRADEX) ophthalmic suspension INSTILL 1 DROP IN BOTH EYES 4 TIMES A DAY FOR 5 DAYS  0  
 naproxen (NAPROSYN) 250 mg tablet Take 1 Tab by mouth two (2) times daily (with meals). 20 Tab 0  
 omeprazole (PRILOSEC) 20 mg capsule TAKE ONE CAPSULE BY MOUTH EVERY DAY 90 Cap 1  
 gabapentin (NEURONTIN) 300 mg capsule TAKE 1 CAPSULE BY MOUTH EVERY NIGHT 90 Cap 1  
 tiotropium (SPIRIVA WITH HANDIHALER) 18 mcg inhalation capsule Take 1 Cap by inhalation daily. 30 Cap 3  
 ergocalciferol (ERGOCALCIFEROL) 50,000 unit capsule Take 1 Cap by mouth every seven (7) days. 20 Cap 1  
 omega 3-dha-epa-fish oil (FISH OIL) 100-160-1,000 mg cap Take  by mouth.  aspirin 81 mg chewable tablet Take 81 mg by mouth daily.  citalopram (CELEXA) 20 mg tablet TAKE 1 TABLET BY MOUTH EVERY MORNING AS NEEDED FOR ANXIETY DEPPRESSION 90 Tab 1  Blood-Glucose Meter (ACCU-CHEK AGATHA PLUS METER) misc Monitor blood sugar twice daily. ICD 10 Code: E11.65 1 Each 0  
 glucose blood VI test strips (ACCU-CHEK AGATHA) strip Monitor blood sugar twice daily. ICD 10 Code: E11.65 200 Strip 3  Blood-Glucose Meter (CONTOUR METER) monitoring kit Monitor BS twice daily 1 Kit 0  
 ASCENSIA CONTOUR strip MONITOR BLOOD SUGAR TWICE DAILY 150 Strip 10  
 lisinopril (PRINIVIL, ZESTRIL) 20 mg tablet Take 1 Tab by mouth daily. 90 Tab 1  
 rosuvastatin (CRESTOR) 40 mg tablet Take 1 Tab by mouth nightly.  90 Tab 1  
 triamcinolone acetonide (KENALOG) 0.025 % topical cream     
 desonide (DESOWEN) 0.05 % topical ointment Apply  to affected area two (2) times a day. 30 g 0  
 MYRBETRIQ 25 mg ER tablet Take 25 mg by mouth daily.  VESICARE 10 mg tablet Take 10 mg by mouth daily.  albuterol (PROVENTIL HFA, VENTOLIN HFA) 90 mcg/actuation inhaler Take 2 puffs by inhalation every four (4) hours as needed for Wheezing. 1 Inhaler 0  
 albuterol (ACCUNEB) 1.25 mg/3 mL nebulizer solution Take 3 mL by inhalation every four (4) hours as needed for Wheezing (wheezing). 25 vial 0  
 metolazone (ZAROXOLYN) 5 mg tablet Take 5 mg by mouth daily.  ezetimibe (ZETIA) 10 mg tablet Take 10 mg by mouth daily.  fluticasone furoate (ARNUITY ELLIPTA) 200 mcg/actuation dsdv inhaler Take 1 Puff by inhalation daily. 1 Inhaler 5  
 Inulin-Sorbitol (FIBER) 2 gram chew Take 2 g by mouth daily. 90 Tab 3 No current facility-administered medications on file prior to visit. Patient Active Problem List  
Diagnosis Code  Gynecomastia N62  
 IDDM (insulin dependent diabetes mellitus) ([facility]) E11.9, Z79.4  COPD (chronic obstructive pulmonary disease) ([facility]) J44.9  History of chronic CHF Z86.79  
 Constipation K59.00  
 CKD (chronic kidney disease) stage 3, GFR 30-59 ml/min ([facility]) N18.3  
 [facility]controlled type II diabetes mellitus [facility] E11.65  
 Essential hypertension with goal blood pressure less than 140/90 I10  Mild intermittent asthma with acute exacerbation J45.21  
 ACP (advance care planning) Z71.89  Vitamin D deficiency E55.9  Type 2 diabetes mellitus with nephropathy ([facility]) E11.21  
 Recurrent depression ([facility] F33.9  Urinary incontinence R32  Type 2 diabetes mellitus with diabetic neuropathy ([facility]) E11.40  Chronic pain syndrome G89.4 Visit Vitals /74 (BP 1 Location: Left arm, BP Patient Position: At rest) Pulse 90 Temp 97.8 °F (36.6 °C) (Oral) Resp 18 Ht 5' 11\" (1.803 m) Wt 213 lb (96.6 kg) SpO2 92% BMI 29.71 kg/m² General appearance: alert, cooperative, no distress, appears stated age Neurologic: Alert and oriented X 3, normal strength and tone, symmetric. Normal without focal findings. Cranial nerves 2-12 intact. Normal coordination and gait. Mental status: Alert, oriented, thought content appropriate, affect: stable, mood-congruent. Head: Normocephalic, without obvious abnormality, atraumatic Eyes: conjunctivae/corneas clear. PERRL, EOM's intact. Neck: supple, symmetrical, trachea midline, no JVD Lungs: clear to auscultation bilaterally Heart: regular rate and rhythm, S1, S2 normal, no murmur, click, rub or gallop Abdomen: soft, non-tender. Extremities: extremities normal, atraumatic, no cyanosis or edema Assessment/Plans: 
 
Diagnoses and all orders for this visit: 1. Encounter for immunization 
-     INFLUENZA VACCINE INACTIVATED (IIV), SUBUNIT, ADJUVANTED, IM 
-     ADMIN INFLUENZA VIRUS VAC 2. Essential hypertension with goal blood pressure less than 155/95 
-     METABOLIC PANEL, COMPREHENSIVE 3. CKD (chronic kidney disease) stage 3, GFR 30-59 ml/min ([facility]) 
-     REFERRAL TO NEPHROLOGY 
-     US RETROPERITONEUM COMP; Future -     METABOLIC PANEL, COMPREHENSIVE 4. Uncontrolled type 2 diabetes mellitus with diabetic nephropathy, with long-term current use of insulin ([facility]) 
-     HEMOGLOBIN A1C W/O EAG 
-     METABOLIC PANEL, COMPREHENSIVE 
-     insulin NPH (HUMULIN N NPH INSULIN KWIKPEN) 100 unit/mL (3 mL) inpn; 50u with breakfast and 55u with dinner 5. Encounter for long-term current use of medication 
-     HEMOGLOBIN A1C W/O EAG 
-     METABOLIC PANEL, COMPREHENSIVE 6. Type 2 diabetes mellitus with nephropathy ([facility]) 
-     REFERRAL TO NEPHROLOGY 
-     US RETROPERITONEUM COMP; Future 
-     insulin NPH (HUMULIN N NPH INSULIN KWIKPEN) 100 unit/mL (3 mL) inpn; 50u with breakfast and 55u with dinner 7. 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 
-     HYDROcodone-acetaminophen (NORCO) 5-325 mg per tablet;  Take 1 Tab by mouth daily as needed for Pain. 8. Uncontrolled type 2 diabetes mellitus with diabetic polyneuropathy, with long-term current use of insulin ([facility] Discussed plans, risk/benefits of treatments/observations. Through the use of shared decision making, above plans were agreed upon. Medication compliance advised. Patient verbalized understanding. Follow-up Disposition: 
Return in about 3 months (around 3/4/2019) for HTN, DM2, meds, labs. Hardeep Byers MD 
12/4/2018

## 2018-12-04 NOTE — PROGRESS NOTES
Chief Complaint Patient presents with  Medication Refill  
  3 month follow up 1. Have you been to the ER, urgent care clinic since your last visit? Hospitalized since your last visit? no 
 
2. Have you seen or consulted any other health care providers outside of the [address] [place] [address] since your last visit? Include any pap smears or colon screening.  no

## 2018-12-05 ENCOUNTER — TELEPHONE (OUTPATIENT)
Dept: FAMILY MEDICINE CLINIC | Age: 83
End: 2018-12-05

## 2018-12-05 NOTE — TELEPHONE ENCOUNTER
Pt referred by Dr. Elliott Nunez for diabetes. Called pt to book appt. He is able to come in on 12/14 and will bring his medications. Not checking his BG much; was taking care of wife who passed away over Thanksgiving. Pt works 3:30 - 11:30 as a  at [employer] M-[employer]. Sits a great deal.   Will mail appt reminder to patient. Patient verbalized understanding of information presented. Answered all of the patient's questions.     Breanna Danielle, PHARMD, CDE

## 2018-12-14 ENCOUNTER — TELEPHONE (OUTPATIENT)
Dept: FAMILY MEDICINE CLINIC | Age: 83
End: 2018-12-14

## 2018-12-14 NOTE — TELEPHONE ENCOUNTER
Pt no show for diabetes visit. Called to discuss and try to reschedule. Mailbox is full. Noticed pt missed another appointment recently. Will try to reach again next week.   Hector Paredes, JESSICAD, CDE

## 2019-01-01 ENCOUNTER — APPOINTMENT (OUTPATIENT)
Dept: CT IMAGING | Age: 84
DRG: 065 | End: 2019-01-01
Attending: NURSE PRACTITIONER
Payer: MEDICARE

## 2019-01-01 ENCOUNTER — HOSPITAL ENCOUNTER (OUTPATIENT)
Dept: ULTRASOUND IMAGING | Age: 84
Discharge: HOME | End: 2019-04-16
Attending: FAMILY MEDICINE
Payer: MEDICARE

## 2019-01-01 ENCOUNTER — OFFICE VISIT (OUTPATIENT)
Dept: FAMILY MEDICINE CLINIC | Age: 84
End: 2019-01-01

## 2019-01-01 ENCOUNTER — TELEPHONE (OUTPATIENT)
Dept: FAMILY MEDICINE CLINIC | Age: 84
End: 2019-01-01

## 2019-01-01 ENCOUNTER — APPOINTMENT (OUTPATIENT)
Dept: GENERAL RADIOLOGY | Age: 84
DRG: 065 | End: 2019-01-01
Attending: EMERGENCY MEDICINE
Payer: MEDICARE

## 2019-01-01 ENCOUNTER — CLINICAL SUPPORT (OUTPATIENT)
Dept: CARDIOLOGY CLINIC | Age: 84
End: 2019-01-01

## 2019-01-01 ENCOUNTER — APPOINTMENT (OUTPATIENT)
Dept: VASCULAR SURGERY | Age: 84
DRG: 065 | End: 2019-01-01
Attending: PSYCHIATRY & NEUROLOGY
Payer: MEDICARE

## 2019-01-01 ENCOUNTER — HOME CARE VISIT (OUTPATIENT)
Dept: SCHEDULING | Facility: HOME HEALTH | Age: 84
End: 2019-01-01
Payer: MEDICARE

## 2019-01-01 ENCOUNTER — HOSPITAL ENCOUNTER (OUTPATIENT)
Dept: GENERAL RADIOLOGY | Age: 84
Discharge: HOME | End: 2019-06-12
Payer: MEDICARE

## 2019-01-01 ENCOUNTER — HOSPITAL ENCOUNTER (INPATIENT)
Age: 84
LOS: 2 days | Discharge: HOME | DRG: 065 | End: 2019-07-20
Attending: EMERGENCY MEDICINE | Admitting: INTERNAL MEDICINE
Payer: MEDICARE

## 2019-01-01 ENCOUNTER — HOSPITAL ENCOUNTER (EMERGENCY)
Age: 84
Discharge: NURSING, REHAB OR LONG-TERM CARE | End: 2019-12-23
Attending: EMERGENCY MEDICINE
Payer: MEDICARE

## 2019-01-01 ENCOUNTER — TELEPHONE (OUTPATIENT)
Dept: NEUROLOGY | Age: 84
End: 2019-01-01

## 2019-01-01 ENCOUNTER — APPOINTMENT (OUTPATIENT)
Dept: CT IMAGING | Age: 84
DRG: 065 | End: 2019-01-01
Attending: EMERGENCY MEDICINE
Payer: MEDICARE

## 2019-01-01 ENCOUNTER — HOSPITAL ENCOUNTER (INPATIENT)
Age: 84
LOS: 1 days | Discharge: HOME HEALTH | DRG: 065 | End: 2019-07-03
Attending: EMERGENCY MEDICINE | Admitting: INTERNAL MEDICINE
Payer: MEDICARE

## 2019-01-01 ENCOUNTER — PATIENT OUTREACH (OUTPATIENT)
Dept: FAMILY MEDICINE CLINIC | Age: 84
End: 2019-01-01

## 2019-01-01 ENCOUNTER — APPOINTMENT (OUTPATIENT)
Dept: GENERAL RADIOLOGY | Age: 84
End: 2019-01-01
Attending: PHYSICAL MEDICINE & REHABILITATION

## 2019-01-01 ENCOUNTER — DOCUMENTATION ONLY (OUTPATIENT)
Dept: NEUROLOGY | Age: 84
End: 2019-01-01

## 2019-01-01 ENCOUNTER — HOSPITAL ENCOUNTER (OUTPATIENT)
Dept: GENERAL RADIOLOGY | Age: 84
Discharge: HOME | End: 2019-04-08
Payer: MEDICARE

## 2019-01-01 ENCOUNTER — APPOINTMENT (OUTPATIENT)
Dept: NON INVASIVE DIAGNOSTICS | Age: 84
DRG: 065 | End: 2019-01-01
Attending: HOSPITALIST
Payer: MEDICARE

## 2019-01-01 ENCOUNTER — TELEPHONE (OUTPATIENT)
Dept: ENDOCRINOLOGY | Age: 84
End: 2019-01-01

## 2019-01-01 ENCOUNTER — HOME HEALTH ADMISSION (OUTPATIENT)
Dept: HOME HEALTH SERVICES | Facility: HOME HEALTH | Age: 84
End: 2019-01-01

## 2019-01-01 ENCOUNTER — APPOINTMENT (OUTPATIENT)
Dept: MRI IMAGING | Age: 84
DRG: 065 | End: 2019-01-01
Attending: INTERNAL MEDICINE
Payer: MEDICARE

## 2019-01-01 ENCOUNTER — APPOINTMENT (OUTPATIENT)
Dept: MRI IMAGING | Age: 84
DRG: 065 | End: 2019-01-01
Attending: HOSPITALIST
Payer: MEDICARE

## 2019-01-01 ENCOUNTER — DOCUMENTATION ONLY (OUTPATIENT)
Dept: FAMILY MEDICINE CLINIC | Age: 84
End: 2019-01-01

## 2019-01-01 ENCOUNTER — HOME CARE VISIT (OUTPATIENT)
Dept: HOME HEALTH SERVICES | Facility: HOME HEALTH | Age: 84
End: 2019-01-01

## 2019-01-01 ENCOUNTER — OFFICE VISIT (OUTPATIENT)
Dept: ENDOCRINOLOGY | Age: 84
End: 2019-01-01

## 2019-01-01 ENCOUNTER — HOSPITAL ENCOUNTER (OUTPATIENT)
Dept: REHABILITATION | Age: 84
End: 2020-01-07
Attending: PHYSICAL MEDICINE & REHABILITATION | Admitting: PHYSICAL MEDICINE & REHABILITATION

## 2019-01-01 ENCOUNTER — TELEPHONE (OUTPATIENT)
Dept: CARDIOLOGY CLINIC | Age: 84
End: 2019-01-01

## 2019-01-01 ENCOUNTER — APPOINTMENT (OUTPATIENT)
Dept: NON INVASIVE DIAGNOSTICS | Age: 84
DRG: 065 | End: 2019-01-01
Attending: NURSE PRACTITIONER
Payer: MEDICARE

## 2019-01-01 ENCOUNTER — OFFICE VISIT (OUTPATIENT)
Dept: NEUROLOGY | Age: 84
End: 2019-01-01

## 2019-01-01 ENCOUNTER — APPOINTMENT (OUTPATIENT)
Dept: CT IMAGING | Age: 84
DRG: 065 | End: 2019-01-01
Attending: PSYCHIATRY & NEUROLOGY
Payer: MEDICARE

## 2019-01-01 ENCOUNTER — APPOINTMENT (OUTPATIENT)
Dept: CT IMAGING | Age: 84
End: 2019-01-01
Attending: PHYSICAL MEDICINE & REHABILITATION

## 2019-01-01 ENCOUNTER — HOSPITAL ENCOUNTER (OUTPATIENT)
Age: 84
Discharge: HOME | End: 2019-07-29
Attending: INTERNAL MEDICINE | Admitting: INTERNAL MEDICINE
Payer: MEDICARE

## 2019-01-01 ENCOUNTER — HOME HEALTH ADMISSION (OUTPATIENT)
Dept: HOME HEALTH SERVICES | Facility: HOME HEALTH | Age: 84
End: 2019-01-01
Payer: MEDICARE

## 2019-01-01 ENCOUNTER — HOME CARE VISIT (OUTPATIENT)
Dept: HOME HEALTH SERVICES | Facility: HOME HEALTH | Age: 84
End: 2019-01-01
Payer: MEDICARE

## 2019-01-01 ENCOUNTER — APPOINTMENT (OUTPATIENT)
Dept: CT IMAGING | Age: 84
End: 2019-01-01
Attending: EMERGENCY MEDICINE
Payer: MEDICARE

## 2019-01-01 ENCOUNTER — HOME CARE VISIT (OUTPATIENT)
Dept: SCHEDULING | Facility: HOME HEALTH | Age: 84
End: 2019-01-01

## 2019-01-01 VITALS
TEMPERATURE: 98.2 F | DIASTOLIC BLOOD PRESSURE: 81 MMHG | HEART RATE: 110 BPM | OXYGEN SATURATION: 94 % | SYSTOLIC BLOOD PRESSURE: 179 MMHG | RESPIRATION RATE: 18 BRPM | BODY MASS INDEX: 29.54 KG/M2 | HEIGHT: 71 IN | WEIGHT: 211 LBS

## 2019-01-01 VITALS
BODY MASS INDEX: 28.14 KG/M2 | WEIGHT: 201 LBS | DIASTOLIC BLOOD PRESSURE: 67 MMHG | OXYGEN SATURATION: 94 % | HEART RATE: 81 BPM | RESPIRATION RATE: 17 BRPM | SYSTOLIC BLOOD PRESSURE: 198 MMHG | HEIGHT: 71 IN | TEMPERATURE: 97.8 F

## 2019-01-01 VITALS
OXYGEN SATURATION: 92 % | TEMPERATURE: 96.7 F | BODY MASS INDEX: 29.2 KG/M2 | RESPIRATION RATE: 18 BRPM | HEIGHT: 71 IN | DIASTOLIC BLOOD PRESSURE: 52 MMHG | HEART RATE: 82 BPM | WEIGHT: 208.6 LBS | SYSTOLIC BLOOD PRESSURE: 134 MMHG

## 2019-01-01 VITALS
OXYGEN SATURATION: 94 % | TEMPERATURE: 97.5 F | DIASTOLIC BLOOD PRESSURE: 55 MMHG | WEIGHT: 201 LBS | BODY MASS INDEX: 28.14 KG/M2 | RESPIRATION RATE: 18 BRPM | HEART RATE: 87 BPM | HEIGHT: 71 IN | SYSTOLIC BLOOD PRESSURE: 142 MMHG

## 2019-01-01 VITALS
DIASTOLIC BLOOD PRESSURE: 70 MMHG | OXYGEN SATURATION: 97 % | RESPIRATION RATE: 18 BRPM | HEART RATE: 93 BPM | SYSTOLIC BLOOD PRESSURE: 130 MMHG | TEMPERATURE: 99 F

## 2019-01-01 VITALS
RESPIRATION RATE: 17 BRPM | TEMPERATURE: 96.5 F | SYSTOLIC BLOOD PRESSURE: 135 MMHG | DIASTOLIC BLOOD PRESSURE: 52 MMHG | OXYGEN SATURATION: 95 % | BODY MASS INDEX: 29.6 KG/M2 | HEIGHT: 71 IN | HEART RATE: 95 BPM | WEIGHT: 211.4 LBS

## 2019-01-01 VITALS
TEMPERATURE: 96.5 F | BODY MASS INDEX: 29.57 KG/M2 | HEIGHT: 71 IN | SYSTOLIC BLOOD PRESSURE: 147 MMHG | WEIGHT: 211.2 LBS | OXYGEN SATURATION: 94 % | RESPIRATION RATE: 18 BRPM | DIASTOLIC BLOOD PRESSURE: 59 MMHG | HEART RATE: 90 BPM

## 2019-01-01 VITALS
HEART RATE: 94 BPM | WEIGHT: 204.8 LBS | DIASTOLIC BLOOD PRESSURE: 59 MMHG | TEMPERATURE: 96.6 F | RESPIRATION RATE: 20 BRPM | HEIGHT: 71 IN | SYSTOLIC BLOOD PRESSURE: 131 MMHG | OXYGEN SATURATION: 93 % | BODY MASS INDEX: 28.67 KG/M2

## 2019-01-01 VITALS
TEMPERATURE: 98.7 F | DIASTOLIC BLOOD PRESSURE: 67 MMHG | HEART RATE: 65 BPM | SYSTOLIC BLOOD PRESSURE: 123 MMHG | OXYGEN SATURATION: 95 % | RESPIRATION RATE: 16 BRPM

## 2019-01-01 VITALS
HEART RATE: 105 BPM | WEIGHT: 196 LBS | SYSTOLIC BLOOD PRESSURE: 142 MMHG | OXYGEN SATURATION: 96 % | BODY MASS INDEX: 27.44 KG/M2 | DIASTOLIC BLOOD PRESSURE: 74 MMHG | HEIGHT: 71 IN

## 2019-01-01 VITALS
HEIGHT: 71 IN | WEIGHT: 205.4 LBS | SYSTOLIC BLOOD PRESSURE: 140 MMHG | RESPIRATION RATE: 18 BRPM | HEART RATE: 88 BPM | BODY MASS INDEX: 28.76 KG/M2 | OXYGEN SATURATION: 95 % | TEMPERATURE: 96.3 F | DIASTOLIC BLOOD PRESSURE: 64 MMHG

## 2019-01-01 VITALS
SYSTOLIC BLOOD PRESSURE: 145 MMHG | DIASTOLIC BLOOD PRESSURE: 57 MMHG | HEART RATE: 80 BPM | OXYGEN SATURATION: 94 % | HEIGHT: 71 IN | RESPIRATION RATE: 19 BRPM | TEMPERATURE: 97.7 F | BODY MASS INDEX: 27.44 KG/M2 | WEIGHT: 196 LBS

## 2019-01-01 VITALS
WEIGHT: 211 LBS | RESPIRATION RATE: 14 BRPM | OXYGEN SATURATION: 95 % | HEART RATE: 74 BPM | BODY MASS INDEX: 29.54 KG/M2 | TEMPERATURE: 96.1 F | HEIGHT: 71 IN | SYSTOLIC BLOOD PRESSURE: 132 MMHG | DIASTOLIC BLOOD PRESSURE: 84 MMHG

## 2019-01-01 VITALS
OXYGEN SATURATION: 94 % | SYSTOLIC BLOOD PRESSURE: 146 MMHG | BODY MASS INDEX: 29.54 KG/M2 | DIASTOLIC BLOOD PRESSURE: 62 MMHG | WEIGHT: 211 LBS | HEART RATE: 93 BPM | HEIGHT: 71 IN

## 2019-01-01 VITALS
RESPIRATION RATE: 16 BRPM | TEMPERATURE: 98.7 F | SYSTOLIC BLOOD PRESSURE: 123 MMHG | OXYGEN SATURATION: 95 % | HEART RATE: 95 BPM | DIASTOLIC BLOOD PRESSURE: 67 MMHG

## 2019-01-01 VITALS
DIASTOLIC BLOOD PRESSURE: 84 MMHG | OXYGEN SATURATION: 98 % | TEMPERATURE: 97.6 F | SYSTOLIC BLOOD PRESSURE: 136 MMHG | RESPIRATION RATE: 18 BRPM | HEART RATE: 74 BPM

## 2019-01-01 VITALS
HEART RATE: 104 BPM | DIASTOLIC BLOOD PRESSURE: 91 MMHG | WEIGHT: 216.6 LBS | SYSTOLIC BLOOD PRESSURE: 128 MMHG | BODY MASS INDEX: 30.32 KG/M2 | HEIGHT: 71 IN

## 2019-01-01 DIAGNOSIS — N18.30 CKD (CHRONIC KIDNEY DISEASE) STAGE 3, GFR 30-59 ML/MIN (HCC): ICD-10-CM

## 2019-01-01 DIAGNOSIS — R55 CONVULSIVE SYNCOPE: ICD-10-CM

## 2019-01-01 DIAGNOSIS — E11.42 DIABETIC PERIPHERAL NEUROPATHY ASSOCIATED WITH TYPE 2 DIABETES MELLITUS (HCC): ICD-10-CM

## 2019-01-01 DIAGNOSIS — I10 ESSENTIAL HYPERTENSION: ICD-10-CM

## 2019-01-01 DIAGNOSIS — M79.671 RIGHT FOOT PAIN: ICD-10-CM

## 2019-01-01 DIAGNOSIS — I62.03 SUBDURAL HEMATOMA, CHRONIC (HCC): ICD-10-CM

## 2019-01-01 DIAGNOSIS — F33.9 RECURRENT DEPRESSION (HCC): ICD-10-CM

## 2019-01-01 DIAGNOSIS — Z86.73 HISTORY OF CVA (CEREBROVASCULAR ACCIDENT): ICD-10-CM

## 2019-01-01 DIAGNOSIS — I65.23 BILATERAL CAROTID ARTERY STENOSIS: ICD-10-CM

## 2019-01-01 DIAGNOSIS — I10 ESSENTIAL HYPERTENSION WITH GOAL BLOOD PRESSURE LESS THAN 140/90: Primary | ICD-10-CM

## 2019-01-01 DIAGNOSIS — R41.82 ALTERED MENTAL STATUS, UNSPECIFIED ALTERED MENTAL STATUS TYPE: ICD-10-CM

## 2019-01-01 DIAGNOSIS — E11.65 UNCONTROLLED TYPE 2 DIABETES MELLITUS WITH HYPERGLYCEMIA (HCC): ICD-10-CM

## 2019-01-01 DIAGNOSIS — I69.993 CVA, OLD, ATAXIA: ICD-10-CM

## 2019-01-01 DIAGNOSIS — Z91.81 RISK FOR FALLS: ICD-10-CM

## 2019-01-01 DIAGNOSIS — M54.50 CHRONIC BILATERAL LOW BACK PAIN WITHOUT SCIATICA: ICD-10-CM

## 2019-01-01 DIAGNOSIS — J44.1 CHRONIC OBSTRUCTIVE PULMONARY DISEASE WITH ACUTE EXACERBATION (HCC): ICD-10-CM

## 2019-01-01 DIAGNOSIS — E78.2 MIXED HYPERLIPIDEMIA: ICD-10-CM

## 2019-01-01 DIAGNOSIS — G89.29 CHRONIC BILATERAL LOW BACK PAIN WITHOUT SCIATICA: ICD-10-CM

## 2019-01-01 DIAGNOSIS — I50.9 HEART FAILURE, UNSPECIFIED (HCC): ICD-10-CM

## 2019-01-01 DIAGNOSIS — R05.9 COUGH: Primary | ICD-10-CM

## 2019-01-01 DIAGNOSIS — J44.9 CHRONIC OBSTRUCTIVE PULMONARY DISEASE, UNSPECIFIED COPD TYPE (HCC): ICD-10-CM

## 2019-01-01 DIAGNOSIS — Z79.899 ENCOUNTER FOR LONG-TERM CURRENT USE OF MEDICATION: ICD-10-CM

## 2019-01-01 DIAGNOSIS — R42 POST-CONCUSSION VERTIGO: ICD-10-CM

## 2019-01-01 DIAGNOSIS — I42.9 CARDIOMYOPATHY, UNSPECIFIED TYPE (HCC): Primary | ICD-10-CM

## 2019-01-01 DIAGNOSIS — Z98.890 OTHER SPECIFIED POSTPROCEDURAL STATES: ICD-10-CM

## 2019-01-01 DIAGNOSIS — E11.21 TYPE 2 DIABETES MELLITUS WITH NEPHROPATHY (HCC): Primary | ICD-10-CM

## 2019-01-01 DIAGNOSIS — G25.0 ESSENTIAL TREMOR: ICD-10-CM
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
## 2019-01-01 DIAGNOSIS — E11.42 PERIPHERAL SENSORY NEUROPATHY DUE TO TYPE 2 DIABETES MELLITUS (HCC): ICD-10-CM

## 2019-01-01 DIAGNOSIS — G47.12 IDIOPATHIC HYPERSOMNIA WITHOUT LONG SLEEP TIME: ICD-10-CM

## 2019-01-01 DIAGNOSIS — R27.0 ATAXIA: ICD-10-CM

## 2019-01-01 DIAGNOSIS — Z09 HOSPITAL DISCHARGE FOLLOW-UP: Primary | ICD-10-CM

## 2019-01-01 DIAGNOSIS — G44.309 POST-CONCUSSION HEADACHE: ICD-10-CM

## 2019-01-01 DIAGNOSIS — E11.21 TYPE 2 DIABETES MELLITUS WITH NEPHROPATHY (HCC): ICD-10-CM

## 2019-01-01 DIAGNOSIS — I63.433 CEREBROVASCULAR ACCIDENT (CVA) DUE TO BILATERAL EMBOLISM OF POSTERIOR CEREBRAL ARTERIES (HCC): Primary | ICD-10-CM

## 2019-01-01 DIAGNOSIS — I63.9 ACUTE CVA (CEREBROVASCULAR ACCIDENT) (HCC): ICD-10-CM

## 2019-01-01 DIAGNOSIS — F07.81 POST-CONCUSSION VERTIGO: ICD-10-CM

## 2019-01-01 DIAGNOSIS — R51.9 ACUTE INTRACTABLE HEADACHE, UNSPECIFIED HEADACHE TYPE: ICD-10-CM

## 2019-01-01 DIAGNOSIS — Z79.4 TYPE 2 DIABETES MELLITUS WITH DIABETIC NEUROPATHY, WITH LONG-TERM CURRENT USE OF INSULIN (HCC): ICD-10-CM

## 2019-01-01 DIAGNOSIS — S92.354A CLOSED NONDISPLACED FRACTURE OF FIFTH METATARSAL BONE OF RIGHT FOOT, INITIAL ENCOUNTER: Primary | ICD-10-CM

## 2019-01-01 DIAGNOSIS — R27.0 ATAXIA: Primary | ICD-10-CM

## 2019-01-01 DIAGNOSIS — S01.21XA LACERATION OF NOSE, INITIAL ENCOUNTER: ICD-10-CM

## 2019-01-01 DIAGNOSIS — F07.81 POST CONCUSSION SYNDROME: ICD-10-CM

## 2019-01-01 DIAGNOSIS — E55.9 VITAMIN D DEFICIENCY: ICD-10-CM

## 2019-01-01 DIAGNOSIS — I10 ESSENTIAL HYPERTENSION WITH GOAL BLOOD PRESSURE LESS THAN 140/90: ICD-10-CM

## 2019-01-01 DIAGNOSIS — H53.9 VISION DISTURBANCE: ICD-10-CM

## 2019-01-01 DIAGNOSIS — E11.42 DIABETIC POLYNEUROPATHY ASSOCIATED WITH TYPE 2 DIABETES MELLITUS (HCC): ICD-10-CM

## 2019-01-01 DIAGNOSIS — E11.40 TYPE 2 DIABETES MELLITUS WITH DIABETIC NEUROPATHY, WITH LONG-TERM CURRENT USE OF INSULIN (HCC): ICD-10-CM

## 2019-01-01 DIAGNOSIS — I63.112: ICD-10-CM

## 2019-01-01 DIAGNOSIS — R05.9 COUGH: ICD-10-CM
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
## 2019-01-01 DIAGNOSIS — R26.81 GAIT INSTABILITY: ICD-10-CM

## 2019-01-01 DIAGNOSIS — Z45.09 ENCOUNTER FOR LOOP RECORDER CHECK: ICD-10-CM

## 2019-01-01 DIAGNOSIS — I42.9 CARDIOMYOPATHY, UNSPECIFIED TYPE (HCC): ICD-10-CM

## 2019-01-01 DIAGNOSIS — S09.90XA INJURY OF HEAD, INITIAL ENCOUNTER: Primary | ICD-10-CM

## 2019-01-01 DIAGNOSIS — R11.2 NON-INTRACTABLE VOMITING WITH NAUSEA, UNSPECIFIED VOMITING TYPE: ICD-10-CM

## 2019-01-01 LAB
25(OH)D3 SERPL-MCNC: 42.9 NG/ML (ref 30–100)
ALBUMIN SERPL-MCNC: 2 G/DL (ref 3.5–5)
ALBUMIN SERPL-MCNC: 2.3 G/DL (ref 3.5–5)
ALBUMIN SERPL-MCNC: 2.4 G/DL (ref 3.5–5)
ALBUMIN SERPL-MCNC: 2.9 G/DL (ref 3.5–5)
ALBUMIN SERPL-MCNC: 3.3 G/DL (ref 3.5–5)
ALBUMIN SERPL-MCNC: 4.1 G/DL (ref 3.5–4.7)
ALBUMIN/CREAT UR: 993 MG/G CREAT (ref 0–30)
ALBUMIN/GLOB SERPL: 0.6 {RATIO} (ref 1.1–2.2)
ALBUMIN/GLOB SERPL: 0.7 {RATIO} (ref 1.1–2.2)
ALBUMIN/GLOB SERPL: 0.7 {RATIO} (ref 1.1–2.2)
ALBUMIN/GLOB SERPL: 0.9 {RATIO} (ref 1.1–2.2)
ALBUMIN/GLOB SERPL: 1 {RATIO} (ref 1.1–2.2)
ALBUMIN/GLOB SERPL: 1.6 {RATIO} (ref 1.2–2.2)
ALP SERPL-CCNC: 399 U/L (ref 45–117)
ALP SERPL-CCNC: 661 U/L (ref 45–117)
ALP SERPL-CCNC: 74 U/L (ref 45–117)
ALP SERPL-CCNC: 77 U/L (ref 45–117)
ALP SERPL-CCNC: 786 U/L (ref 45–117)
ALP SERPL-CCNC: 94 IU/L (ref 39–117)
ALT SERPL-CCNC: 131 U/L (ref 12–78)
ALT SERPL-CCNC: 14 IU/L (ref 0–44)
ALT SERPL-CCNC: 19 U/L (ref 12–78)
ALT SERPL-CCNC: 21 U/L (ref 12–78)
ALT SERPL-CCNC: 229 U/L (ref 12–78)
ALT SERPL-CCNC: 44 U/L (ref 12–78)
ANA SER QL: NEGATIVE
ANION GAP SERPL CALC-SCNC: 3 MMOL/L (ref 5–15)
ANION GAP SERPL CALC-SCNC: 4 MMOL/L (ref 5–15)
ANION GAP SERPL CALC-SCNC: 5 MMOL/L (ref 5–15)
ANION GAP SERPL CALC-SCNC: 6 MMOL/L (ref 5–15)
ANION GAP SERPL CALC-SCNC: 7 MMOL/L (ref 5–15)
APPEARANCE UR: CLEAR
AST SERPL-CCNC: 10 U/L (ref 15–37)
AST SERPL-CCNC: 12 U/L (ref 15–37)
AST SERPL-CCNC: 13 U/L (ref 15–37)
AST SERPL-CCNC: 14 IU/L (ref 0–40)
AST SERPL-CCNC: 26 U/L (ref 15–37)
AST SERPL-CCNC: 60 U/L (ref 15–37)
ATRIAL RATE: 93 BPM
BACTERIA SPEC CULT: NORMAL
BACTERIA URNS QL MICRO: NEGATIVE /HPF
BASOPHILS # BLD AUTO: 0 X10E3/UL (ref 0–0.2)
BASOPHILS # BLD: 0 K/UL (ref 0–0.1)
BASOPHILS # BLD: 0.1 K/UL (ref 0–0.1)
BASOPHILS NFR BLD AUTO: 0 %
BASOPHILS NFR BLD: 0 % (ref 0–1)
BASOPHILS NFR BLD: 1 % (ref 0–1)
BILIRUB SERPL-MCNC: 0.1 MG/DL (ref 0.2–1)
BILIRUB SERPL-MCNC: 0.3 MG/DL (ref 0.2–1)
BILIRUB SERPL-MCNC: 0.4 MG/DL (ref 0.2–1)
BILIRUB SERPL-MCNC: 0.4 MG/DL (ref 0–1.2)
BILIRUB SERPL-MCNC: 0.5 MG/DL (ref 0.2–1)
BILIRUB SERPL-MCNC: 0.5 MG/DL (ref 0.2–1)
BILIRUB UR QL: NEGATIVE
BNP SERPL-MCNC: 26.1 PG/ML (ref 0–100)
BUN SERPL-MCNC: 16 MG/DL (ref 6–20)
BUN SERPL-MCNC: 18 MG/DL (ref 6–20)
BUN SERPL-MCNC: 23 MG/DL (ref 6–20)
BUN SERPL-MCNC: 26 MG/DL (ref 6–20)
BUN SERPL-MCNC: 26 MG/DL (ref 6–20)
BUN SERPL-MCNC: 29 MG/DL (ref 6–20)
BUN SERPL-MCNC: 33 MG/DL (ref 6–20)
BUN SERPL-MCNC: 34 MG/DL (ref 6–20)
BUN SERPL-MCNC: 35 MG/DL (ref 8–27)
BUN SERPL-MCNC: 38 MG/DL (ref 6–20)
BUN/CREAT SERPL: 12 (ref 12–20)
BUN/CREAT SERPL: 13 (ref 12–20)
BUN/CREAT SERPL: 18 (ref 12–20)
BUN/CREAT SERPL: 19 (ref 12–20)
BUN/CREAT SERPL: 22 (ref 10–24)
BUN/CREAT SERPL: 23 (ref 12–20)
BUN/CREAT SERPL: 23 (ref 12–20)
BUN/CREAT SERPL: 26 (ref 12–20)
BUN/CREAT SERPL: 29 (ref 12–20)
BUN/CREAT SERPL: 31 (ref 12–20)
CALCIUM SERPL-MCNC: 10.7 MG/DL (ref 8.6–10.2)
CALCIUM SERPL-MCNC: 8.7 MG/DL (ref 8.5–10.1)
CALCIUM SERPL-MCNC: 8.7 MG/DL (ref 8.5–10.1)
CALCIUM SERPL-MCNC: 8.8 MG/DL (ref 8.5–10.1)
CALCIUM SERPL-MCNC: 8.8 MG/DL (ref 8.5–10.1)
CALCIUM SERPL-MCNC: 8.9 MG/DL (ref 8.5–10.1)
CALCIUM SERPL-MCNC: 9.1 MG/DL (ref 8.5–10.1)
CALCIUM SERPL-MCNC: 9.3 MG/DL (ref 8.5–10.1)
CALCIUM SERPL-MCNC: 9.4 MG/DL (ref 8.5–10.1)
CALCIUM SERPL-MCNC: 9.6 MG/DL (ref 8.5–10.1)
CALCULATED P AXIS, ECG09: 74 DEGREES
CALCULATED R AXIS, ECG10: 62 DEGREES
CALCULATED T AXIS, ECG11: 76 DEGREES
CHLORIDE SERPL-SCNC: 100 MMOL/L (ref 97–108)
CHLORIDE SERPL-SCNC: 100 MMOL/L (ref 97–108)
CHLORIDE SERPL-SCNC: 102 MMOL/L (ref 97–108)
CHLORIDE SERPL-SCNC: 102 MMOL/L (ref 97–108)
CHLORIDE SERPL-SCNC: 103 MMOL/L (ref 97–108)
CHLORIDE SERPL-SCNC: 103 MMOL/L (ref 97–108)
CHLORIDE SERPL-SCNC: 107 MMOL/L (ref 97–108)
CHLORIDE SERPL-SCNC: 108 MMOL/L (ref 97–108)
CHLORIDE SERPL-SCNC: 110 MMOL/L (ref 97–108)
CHLORIDE SERPL-SCNC: 88 MMOL/L (ref 96–106)
CHOLEST SERPL-MCNC: 152 MG/DL
CHOLEST SERPL-MCNC: 242 MG/DL
CO2 SERPL-SCNC: 27 MMOL/L (ref 21–32)
CO2 SERPL-SCNC: 28 MMOL/L (ref 21–32)
CO2 SERPL-SCNC: 28 MMOL/L (ref 21–32)
CO2 SERPL-SCNC: 29 MMOL/L (ref 21–32)
CO2 SERPL-SCNC: 29 MMOL/L (ref 21–32)
CO2 SERPL-SCNC: 30 MMOL/L (ref 21–32)
CO2 SERPL-SCNC: 31 MMOL/L (ref 20–29)
COLOR UR: ABNORMAL
COMMENT, HOLDF: NORMAL
CREAT SERPL-MCNC: 1.02 MG/DL (ref 0.7–1.3)
CREAT SERPL-MCNC: 1.18 MG/DL (ref 0.7–1.3)
CREAT SERPL-MCNC: 1.24 MG/DL (ref 0.7–1.3)
CREAT SERPL-MCNC: 1.26 MG/DL (ref 0.7–1.3)
CREAT SERPL-MCNC: 1.27 MG/DL (ref 0.7–1.3)
CREAT SERPL-MCNC: 1.33 MG/DL (ref 0.7–1.3)
CREAT SERPL-MCNC: 1.36 MG/DL (ref 0.7–1.3)
CREAT SERPL-MCNC: 1.39 MG/DL (ref 0.7–1.3)
CREAT SERPL-MCNC: 1.42 MG/DL (ref 0.7–1.3)
CREAT SERPL-MCNC: 1.57 MG/DL (ref 0.76–1.27)
CREAT UR-MCNC: 44.5 MG/DL
DIAGNOSIS, 93000: NORMAL
DIFFERENTIAL METHOD BLD: ABNORMAL
DIFFERENTIAL METHOD BLD: ABNORMAL
ECHO AV AREA PEAK VELOCITY: 2.3 CM2
ECHO AV AREA VTI: 2.6 CM2
ECHO AV AREA/BSA PEAK VELOCITY: 1.1 CM2/M2
ECHO AV AREA/BSA VTI: 1.2 CM2/M2
ECHO AV MEAN GRADIENT: 4 MMHG
ECHO AV PEAK GRADIENT: 7.2 MMHG
ECHO AV PEAK VELOCITY: 133.9 CM/S
ECHO AV VTI: 31.87 CM
ECHO EST RA PRESSURE: 10 MMHG
ECHO LV INTERNAL DIMENSION DIASTOLIC: 4.35 CM (ref 4.2–5.9)
ECHO LV INTERNAL DIMENSION SYSTOLIC: 1.56 CM
ECHO LV IVSD: 1.12 CM (ref 0.6–1)
ECHO LV MASS 2D: 200.9 G (ref 88–224)
ECHO LV MASS INDEX 2D: 93.1 G/M2 (ref 49–115)
ECHO LV POSTERIOR WALL DIASTOLIC: 1.13 CM (ref 0.6–1)
ECHO LVOT DIAM: 2.05 CM
ECHO LVOT PEAK GRADIENT: 3.6 MMHG
ECHO LVOT PEAK VELOCITY: 94.86 CM/S
ECHO LVOT SV: 83.4 ML
ECHO LVOT VTI: 25.27 CM
ECHO PULMONARY ARTERY SYSTOLIC PRESSURE (PASP): 24.7 MMHG
ECHO RIGHT VENTRICULAR SYSTOLIC PRESSURE (RVSP): 24.7 MMHG
ECHO TV REGURGITANT MAX VELOCITY: 191.43 CM/S
ECHO TV REGURGITANT PEAK GRADIENT: 14.7 MMHG
EOSINOPHIL # BLD AUTO: 0 X10E3/UL (ref 0–0.4)
EOSINOPHIL # BLD: 0.1 K/UL (ref 0–0.4)
EOSINOPHIL # BLD: 0.1 K/UL (ref 0–0.4)
EOSINOPHIL NFR BLD AUTO: 0 %
EOSINOPHIL NFR BLD: 1 % (ref 0–7)
EOSINOPHIL NFR BLD: 1 % (ref 0–7)
EPITH CASTS URNS QL MICRO: ABNORMAL /LPF
ERYTHROCYTE [DISTWIDTH] IN BLOOD BY AUTOMATED COUNT: 12.4 % (ref 11.5–14.5)
ERYTHROCYTE [DISTWIDTH] IN BLOOD BY AUTOMATED COUNT: 12.6 % (ref 11.5–14.5)
ERYTHROCYTE [DISTWIDTH] IN BLOOD BY AUTOMATED COUNT: 13.1 % (ref 11.5–14.5)
ERYTHROCYTE [DISTWIDTH] IN BLOOD BY AUTOMATED COUNT: 13.5 % (ref 12.3–15.4)
ERYTHROCYTE [DISTWIDTH] IN BLOOD BY AUTOMATED COUNT: 14.4 % (ref 11.5–14.5)
ERYTHROCYTE [DISTWIDTH] IN BLOOD BY AUTOMATED COUNT: 14.5 % (ref 11.5–14.5)
ERYTHROCYTE [DISTWIDTH] IN BLOOD BY AUTOMATED COUNT: 14.5 % (ref 11.5–14.5)
ERYTHROCYTE [DISTWIDTH] IN BLOOD BY AUTOMATED COUNT: 14.6 % (ref 11.5–14.5)
ERYTHROCYTE [SEDIMENTATION RATE] IN BLOOD: 43 MM/HR (ref 0–20)
EST. AVERAGE GLUCOSE BLD GHB EST-MCNC: 252 MG/DL
EST. AVERAGE GLUCOSE BLD GHB EST-MCNC: 255 MG/DL
FOLATE SERPL-MCNC: 15.1 NG/ML (ref 5–21)
FOLATE SERPL-MCNC: 16.3 NG/ML (ref 5–21)
GLOBULIN SER CALC-MCNC: 2.6 G/DL (ref 1.5–4.5)
GLOBULIN SER CALC-MCNC: 3.3 G/DL (ref 2–4)
GLOBULIN SER CALC-MCNC: 3.3 G/DL (ref 2–4)
GLOBULIN SER CALC-MCNC: 3.4 G/DL (ref 2–4)
GLOBULIN SER CALC-MCNC: 3.5 G/DL (ref 2–4)
GLOBULIN SER CALC-MCNC: 3.6 G/DL (ref 2–4)
GLUCOSE BLD STRIP.AUTO-MCNC: 101 MG/DL (ref 65–100)
GLUCOSE BLD STRIP.AUTO-MCNC: 116 MG/DL (ref 65–100)
GLUCOSE BLD STRIP.AUTO-MCNC: 117 MG/DL (ref 65–100)
GLUCOSE BLD STRIP.AUTO-MCNC: 119 MG/DL (ref 65–100)
GLUCOSE BLD STRIP.AUTO-MCNC: 120 MG/DL (ref 65–100)
GLUCOSE BLD STRIP.AUTO-MCNC: 126 MG/DL (ref 65–100)
GLUCOSE BLD STRIP.AUTO-MCNC: 128 MG/DL (ref 65–100)
GLUCOSE BLD STRIP.AUTO-MCNC: 158 MG/DL (ref 65–100)
GLUCOSE BLD STRIP.AUTO-MCNC: 178 MG/DL (ref 65–100)
GLUCOSE BLD STRIP.AUTO-MCNC: 195 MG/DL (ref 65–100)
GLUCOSE BLD STRIP.AUTO-MCNC: 207 MG/DL (ref 65–100)
GLUCOSE BLD STRIP.AUTO-MCNC: 235 MG/DL (ref 65–100)
GLUCOSE BLD STRIP.AUTO-MCNC: 246 MG/DL (ref 65–100)
GLUCOSE BLD STRIP.AUTO-MCNC: 252 MG/DL (ref 65–100)
GLUCOSE BLD STRIP.AUTO-MCNC: 254 MG/DL (ref 65–100)
GLUCOSE BLD STRIP.AUTO-MCNC: 297 MG/DL (ref 65–100)
GLUCOSE BLD STRIP.AUTO-MCNC: 304 MG/DL (ref 65–100)
GLUCOSE BLD STRIP.AUTO-MCNC: 377 MG/DL (ref 65–100)
GLUCOSE BLD STRIP.AUTO-MCNC: 50 MG/DL (ref 65–100)
GLUCOSE BLD STRIP.AUTO-MCNC: 63 MG/DL (ref 65–100)
GLUCOSE BLD STRIP.AUTO-MCNC: 68 MG/DL (ref 65–100)
GLUCOSE BLD STRIP.AUTO-MCNC: 79 MG/DL (ref 65–100)
GLUCOSE BLD STRIP.AUTO-MCNC: 90 MG/DL (ref 65–100)
GLUCOSE BLD STRIP.AUTO-MCNC: 91 MG/DL (ref 65–100)
GLUCOSE SERPL-MCNC: 103 MG/DL (ref 65–100)
GLUCOSE SERPL-MCNC: 133 MG/DL (ref 65–100)
GLUCOSE SERPL-MCNC: 150 MG/DL (ref 65–100)
GLUCOSE SERPL-MCNC: 173 MG/DL (ref 65–100)
GLUCOSE SERPL-MCNC: 180 MG/DL (ref 65–100)
GLUCOSE SERPL-MCNC: 247 MG/DL (ref 65–100)
GLUCOSE SERPL-MCNC: 252 MG/DL (ref 65–100)
GLUCOSE SERPL-MCNC: 298 MG/DL (ref 65–100)
GLUCOSE SERPL-MCNC: 368 MG/DL (ref 65–100)
GLUCOSE SERPL-MCNC: 572 MG/DL (ref 65–99)
GLUCOSE UR STRIP.AUTO-MCNC: 500 MG/DL
GLUCOSE UR STRIP.AUTO-MCNC: >1000 MG/DL
GLUCOSE UR STRIP.AUTO-MCNC: NEGATIVE MG/DL
HBA1C MFR BLD HPLC: 11.8 %
HBA1C MFR BLD HPLC: 9.8 %
HBA1C MFR BLD: 10.4 % (ref 4.2–6.3)
HBA1C MFR BLD: 10.5 % (ref 4.2–6.3)
HCT VFR BLD AUTO: 23.7 % (ref 36.6–50.3)
HCT VFR BLD AUTO: 24.6 % (ref 36.6–50.3)
HCT VFR BLD AUTO: 25.2 % (ref 36.6–50.3)
HCT VFR BLD AUTO: 25.7 % (ref 36.6–50.3)
HCT VFR BLD AUTO: 39.5 % (ref 36.6–50.3)
HCT VFR BLD AUTO: 40 % (ref 36.6–50.3)
HCT VFR BLD AUTO: 42 % (ref 36.6–50.3)
HCT VFR BLD AUTO: 46.2 % (ref 37.5–51)
HCYS SERPL-SCNC: 11.6 UMOL/L (ref 3.7–13.9)
HDLC SERPL-MCNC: 35 MG/DL
HDLC SERPL-MCNC: 41 MG/DL
HDLC SERPL: 4.3 {RATIO} (ref 0–5)
HDLC SERPL: 5.9 {RATIO} (ref 0–5)
HGB BLD-MCNC: 13 G/DL (ref 12.1–17)
HGB BLD-MCNC: 13.8 G/DL (ref 12.1–17)
HGB BLD-MCNC: 14.1 G/DL (ref 12.1–17)
HGB BLD-MCNC: 14.7 G/DL (ref 13–17.7)
HGB BLD-MCNC: 7.4 G/DL (ref 12.1–17)
HGB BLD-MCNC: 7.8 G/DL (ref 12.1–17)
HGB BLD-MCNC: 8 G/DL (ref 12.1–17)
HGB BLD-MCNC: 8.1 G/DL (ref 12.1–17)
HGB UR QL STRIP: ABNORMAL
HGB UR QL STRIP: NEGATIVE
HGB UR QL STRIP: NEGATIVE
HYALINE CASTS URNS QL MICRO: ABNORMAL /LPF (ref 0–5)
HYALINE CASTS URNS QL MICRO: ABNORMAL /LPF (ref 0–5)
IMM GRANULOCYTES # BLD AUTO: 0 K/UL (ref 0–0.04)
IMM GRANULOCYTES # BLD AUTO: 0 K/UL (ref 0–0.04)
IMM GRANULOCYTES # BLD AUTO: 0 X10E3/UL (ref 0–0.1)
IMM GRANULOCYTES NFR BLD AUTO: 0 %
IMM GRANULOCYTES NFR BLD AUTO: 0 % (ref 0–0.5)
IMM GRANULOCYTES NFR BLD AUTO: 0 % (ref 0–0.5)
INTERPRETATION: NORMAL
KETONES UR QL STRIP.AUTO: ABNORMAL MG/DL
KETONES UR QL STRIP.AUTO: NEGATIVE MG/DL
KETONES UR QL STRIP.AUTO: NEGATIVE MG/DL
LDLC SERPL CALC-MCNC: 151 MG/DL (ref 0–100)
LDLC SERPL CALC-MCNC: 92 MG/DL (ref 0–100)
LEFT CCA DIST DIAS: 19.9 CM/S
LEFT CCA DIST SYS: 71.8 CM/S
LEFT CCA PROX DIAS: 18.8 CM/S
LEFT CCA PROX SYS: 83.4 CM/S
LEFT ECA DIAS: 0 CM/S
LEFT ECA SYS: 138.6 CM/S
LEFT ICA DIST DIAS: 38.7 CM/S
LEFT ICA DIST SYS: 140 CM/S
LEFT ICA MID DIAS: 33.4 CM/S
LEFT ICA MID SYS: 192.3 CM/S
LEFT ICA PROX DIAS: 59.1 CM/S
LEFT ICA PROX SYS: 252.4 CM/S
LEFT ICA/CCA SYS: 3.5
LEFT SUBCLAVIAN DIAS: 0 CM/S
LEFT SUBCLAVIAN SYS: 152.2 CM/S
LEFT VERTEBRAL DIAS: 11 CM/S
LEFT VERTEBRAL SYS: 38.5 CM/S
LEUKOCYTE ESTERASE UR QL STRIP.AUTO: ABNORMAL
LEUKOCYTE ESTERASE UR QL STRIP.AUTO: NEGATIVE
LEUKOCYTE ESTERASE UR QL STRIP.AUTO: NEGATIVE
LIPID PROFILE,FLP: ABNORMAL
LIPID PROFILE,FLP: NORMAL
LYMPHOCYTES # BLD AUTO: 1 X10E3/UL (ref 0.7–3.1)
LYMPHOCYTES # BLD: 0.8 K/UL (ref 0.8–3.5)
LYMPHOCYTES # BLD: 0.9 K/UL (ref 0.8–3.5)
LYMPHOCYTES NFR BLD AUTO: 10 %
LYMPHOCYTES NFR BLD: 10 % (ref 12–49)
LYMPHOCYTES NFR BLD: 9 % (ref 12–49)
Lab: NORMAL
MAGNESIUM SERPL-MCNC: 1.6 MG/DL (ref 1.6–2.4)
MAGNESIUM SERPL-MCNC: 1.8 MG/DL (ref 1.6–2.4)
MAGNESIUM SERPL-MCNC: 2.1 MG/DL (ref 1.6–2.4)
MCH RBC QN AUTO: 30.3 PG (ref 26–34)
MCH RBC QN AUTO: 30.4 PG (ref 26–34)
MCH RBC QN AUTO: 30.8 PG (ref 26–34)
MCH RBC QN AUTO: 30.9 PG (ref 26.6–33)
MCH RBC QN AUTO: 30.9 PG (ref 26–34)
MCH RBC QN AUTO: 31 PG (ref 26–34)
MCH RBC QN AUTO: 31.2 PG (ref 26–34)
MCH RBC QN AUTO: 31.2 PG (ref 26–34)
MCHC RBC AUTO-ENTMCNC: 31.2 G/DL (ref 30–36.5)
MCHC RBC AUTO-ENTMCNC: 31.5 G/DL (ref 30–36.5)
MCHC RBC AUTO-ENTMCNC: 31.7 G/DL (ref 30–36.5)
MCHC RBC AUTO-ENTMCNC: 31.7 G/DL (ref 30–36.5)
MCHC RBC AUTO-ENTMCNC: 31.8 G/DL (ref 31.5–35.7)
MCHC RBC AUTO-ENTMCNC: 32.9 G/DL (ref 30–36.5)
MCHC RBC AUTO-ENTMCNC: 33.6 G/DL (ref 30–36.5)
MCHC RBC AUTO-ENTMCNC: 34.5 G/DL (ref 30–36.5)
MCV RBC AUTO: 90.3 FL (ref 80–99)
MCV RBC AUTO: 90.5 FL (ref 80–99)
MCV RBC AUTO: 92.3 FL (ref 80–99)
MCV RBC AUTO: 97 FL (ref 79–97)
MCV RBC AUTO: 97.3 FL (ref 80–99)
MCV RBC AUTO: 98.4 FL (ref 80–99)
MCV RBC AUTO: 98.5 FL (ref 80–99)
MCV RBC AUTO: 98.8 FL (ref 80–99)
MICROALBUMIN UR-MCNC: 441.9 UG/ML
MONOCYTES # BLD AUTO: 0.7 X10E3/UL (ref 0.1–0.9)
MONOCYTES # BLD: 0.4 K/UL (ref 0–1)
MONOCYTES # BLD: 0.6 K/UL (ref 0–1)
MONOCYTES NFR BLD AUTO: 7 %
MONOCYTES NFR BLD: 5 % (ref 5–13)
MONOCYTES NFR BLD: 6 % (ref 5–13)
NEUTROPHILS # BLD AUTO: 8.3 X10E3/UL (ref 1.4–7)
NEUTROPHILS NFR BLD AUTO: 83 %
NEUTS SEG # BLD: 7.3 K/UL (ref 1.8–8)
NEUTS SEG # BLD: 7.9 K/UL (ref 1.8–8)
NEUTS SEG NFR BLD: 83 % (ref 32–75)
NEUTS SEG NFR BLD: 84 % (ref 32–75)
NITRITE UR QL STRIP.AUTO: NEGATIVE
NRBC # BLD: 0 K/UL (ref 0–0.01)
NRBC BLD-RTO: 0 PER 100 WBC
P-R INTERVAL, ECG05: 202 MS
PH UR STRIP: 5 [PH] (ref 5–8)
PH UR STRIP: 5.5 [PH] (ref 5–8)
PH UR STRIP: 7.5 [PH] (ref 5–8)
PHOSPHATE SERPL-MCNC: 3.9 MG/DL (ref 2.6–4.7)
PLATELET # BLD AUTO: 243 K/UL (ref 150–400)
PLATELET # BLD AUTO: 254 K/UL (ref 150–400)
PLATELET # BLD AUTO: 268 K/UL (ref 150–400)
PLATELET # BLD AUTO: 295 K/UL (ref 150–400)
PLATELET # BLD AUTO: 337 X10E3/UL (ref 150–379)
PLATELET # BLD AUTO: 381 K/UL (ref 150–400)
PLATELET # BLD AUTO: 424 K/UL (ref 150–400)
PLATELET # BLD AUTO: 438 K/UL (ref 150–400)
PMV BLD AUTO: 9 FL (ref 8.9–12.9)
PMV BLD AUTO: 9.2 FL (ref 8.9–12.9)
PMV BLD AUTO: 9.8 FL (ref 8.9–12.9)
PMV BLD AUTO: 9.8 FL (ref 8.9–12.9)
PMV BLD AUTO: 9.9 FL (ref 8.9–12.9)
POTASSIUM SERPL-SCNC: 4.3 MMOL/L (ref 3.5–5.1)
POTASSIUM SERPL-SCNC: 4.5 MMOL/L (ref 3.5–5.1)
POTASSIUM SERPL-SCNC: 4.6 MMOL/L (ref 3.5–5.1)
POTASSIUM SERPL-SCNC: 4.7 MMOL/L (ref 3.5–5.1)
POTASSIUM SERPL-SCNC: 4.8 MMOL/L (ref 3.5–5.1)
POTASSIUM SERPL-SCNC: 4.8 MMOL/L (ref 3.5–5.1)
POTASSIUM SERPL-SCNC: 4.9 MMOL/L (ref 3.5–5.1)
POTASSIUM SERPL-SCNC: 5 MMOL/L (ref 3.5–5.1)
POTASSIUM SERPL-SCNC: 5.1 MMOL/L (ref 3.5–5.2)
POTASSIUM SERPL-SCNC: 5.3 MMOL/L (ref 3.5–5.1)
PROT SERPL-MCNC: 5.6 G/DL (ref 6.4–8.2)
PROT SERPL-MCNC: 5.7 G/DL (ref 6.4–8.2)
PROT SERPL-MCNC: 5.9 G/DL (ref 6.4–8.2)
PROT SERPL-MCNC: 6.2 G/DL (ref 6.4–8.2)
PROT SERPL-MCNC: 6.6 G/DL (ref 6.4–8.2)
PROT SERPL-MCNC: 6.7 G/DL (ref 6–8.5)
PROT UR STRIP-MCNC: 100 MG/DL
Q-T INTERVAL, ECG07: 336 MS
QRS DURATION, ECG06: 84 MS
QTC CALCULATION (BEZET), ECG08: 417 MS
RBC # BLD AUTO: 2.4 M/UL (ref 4.1–5.7)
RBC # BLD AUTO: 2.5 M/UL (ref 4.1–5.7)
RBC # BLD AUTO: 2.59 M/UL (ref 4.1–5.7)
RBC # BLD AUTO: 2.61 M/UL (ref 4.1–5.7)
RBC # BLD AUTO: 4.28 M/UL (ref 4.1–5.7)
RBC # BLD AUTO: 4.42 M/UL (ref 4.1–5.7)
RBC # BLD AUTO: 4.65 M/UL (ref 4.1–5.7)
RBC # BLD AUTO: 4.75 X10E6/UL (ref 4.14–5.8)
RBC #/AREA URNS HPF: ABNORMAL /HPF (ref 0–5)
RBC MORPH BLD: ABNORMAL
RIGHT CCA DIST DIAS: 15.8 CM/S
RIGHT CCA DIST SYS: 117.9 CM/S
RIGHT CCA PROX DIAS: 22.1 CM/S
RIGHT CCA PROX SYS: 115.1 CM/S
RIGHT ECA DIAS: 0 CM/S
RIGHT ECA SYS: 121.3 CM/S
RIGHT ICA DIST DIAS: 30.7 CM/S
RIGHT ICA DIST SYS: 114.7 CM/S
RIGHT ICA MID DIAS: 14.9 CM/S
RIGHT ICA MID SYS: 66.8 CM/S
RIGHT ICA PROX DIAS: 20 CM/S
RIGHT ICA PROX SYS: 82.2 CM/S
RIGHT ICA/CCA SYS: 1
RIGHT SUBCLAVIAN DIAS: 0 CM/S
RIGHT SUBCLAVIAN SYS: 86.9 CM/S
RIGHT VERTEBRAL DIAS: 13.3 CM/S
RIGHT VERTEBRAL SYS: 39.2 CM/S
SAMPLES BEING HELD,HOLD: NORMAL
SERVICE CMNT-IMP: ABNORMAL
SERVICE CMNT-IMP: NORMAL
SODIUM SERPL-SCNC: 133 MMOL/L (ref 134–144)
SODIUM SERPL-SCNC: 134 MMOL/L (ref 136–145)
SODIUM SERPL-SCNC: 135 MMOL/L (ref 136–145)
SODIUM SERPL-SCNC: 135 MMOL/L (ref 136–145)
SODIUM SERPL-SCNC: 136 MMOL/L (ref 136–145)
SODIUM SERPL-SCNC: 136 MMOL/L (ref 136–145)
SODIUM SERPL-SCNC: 137 MMOL/L (ref 136–145)
SODIUM SERPL-SCNC: 139 MMOL/L (ref 136–145)
SODIUM SERPL-SCNC: 140 MMOL/L (ref 136–145)
SODIUM SERPL-SCNC: 140 MMOL/L (ref 136–145)
SP GR UR REFRACTOMETRY: 1.02 (ref 1–1.03)
TRIGL SERPL-MCNC: 125 MG/DL (ref ?–150)
TRIGL SERPL-MCNC: 250 MG/DL (ref ?–150)
TROPONIN I SERPL-MCNC: <0.05 NG/ML
TSH SERPL DL<=0.005 MIU/L-ACNC: 2.49 UIU/ML (ref 0.45–4.5)
TSH SERPL DL<=0.05 MIU/L-ACNC: 3.3 UIU/ML (ref 0.36–3.74)
UA: UC IF INDICATED,UAUC: ABNORMAL
UA: UC IF INDICATED,UAUC: ABNORMAL
UROBILINOGEN UR QL STRIP.AUTO: 0.2 EU/DL (ref 0.2–1)
UROBILINOGEN UR QL STRIP.AUTO: 0.2 EU/DL (ref 0.2–1)
UROBILINOGEN UR QL STRIP.AUTO: 1 EU/DL (ref 0.2–1)
VENTRICULAR RATE, ECG03: 93 BPM
VIT B12 SERPL-MCNC: 246 PG/ML (ref 193–986)
VIT B12 SERPL-MCNC: 254 PG/ML (ref 193–986)
VLDLC SERPL CALC-MCNC: 25 MG/DL
VLDLC SERPL CALC-MCNC: 50 MG/DL
WBC # BLD AUTO: 10.1 X10E3/UL (ref 3.4–10.8)
WBC # BLD AUTO: 6.1 K/UL (ref 4.1–11.1)
WBC # BLD AUTO: 6.8 K/UL (ref 4.1–11.1)
WBC # BLD AUTO: 7.3 K/UL (ref 4.1–11.1)
WBC # BLD AUTO: 7.4 K/UL (ref 4.1–11.1)
WBC # BLD AUTO: 8.7 K/UL (ref 4.1–11.1)
WBC # BLD AUTO: 9.5 K/UL (ref 4.1–11.1)
WBC # BLD AUTO: 9.6 K/UL (ref 4.1–11.1)
WBC URNS QL MICRO: ABNORMAL /HPF (ref 0–4)

## 2019-01-01 PROCEDURE — 82962 GLUCOSE BLOOD TEST: CPT

## 2019-01-01 PROCEDURE — 36415 COLL VENOUS BLD VENIPUNCTURE: CPT

## 2019-01-01 PROCEDURE — 74011636637 HC RX REV CODE- 636/637: Performed by: HOSPITALIST

## 2019-01-01 PROCEDURE — 74011000250 HC RX REV CODE- 250: Performed by: INTERNAL MEDICINE

## 2019-01-01 PROCEDURE — 81001 URINALYSIS AUTO W/SCOPE: CPT

## 2019-01-01 PROCEDURE — 400013 HH SOC

## 2019-01-01 PROCEDURE — 92610 EVALUATE SWALLOWING FUNCTION: CPT

## 2019-01-01 PROCEDURE — G0299 HHS/HOSPICE OF RN EA 15 MIN: HCPCS

## 2019-01-01 PROCEDURE — 74011250636 HC RX REV CODE- 250/636

## 2019-01-01 PROCEDURE — 74011250637 HC RX REV CODE- 250/637: Performed by: HOSPITALIST

## 2019-01-01 PROCEDURE — 71045 X-RAY EXAM CHEST 1 VIEW: CPT

## 2019-01-01 PROCEDURE — 82746 ASSAY OF FOLIC ACID SERUM: CPT

## 2019-01-01 PROCEDURE — 74011250636 HC RX REV CODE- 250/636: Performed by: PSYCHIATRY & NEUROLOGY

## 2019-01-01 PROCEDURE — 80053 COMPREHEN METABOLIC PANEL: CPT

## 2019-01-01 PROCEDURE — 74011250636 HC RX REV CODE- 250/636: Performed by: HOSPITALIST

## 2019-01-01 PROCEDURE — 85027 COMPLETE CBC AUTOMATED: CPT

## 2019-01-01 PROCEDURE — 74011636637 HC RX REV CODE- 636/637: Performed by: INTERNAL MEDICINE

## 2019-01-01 PROCEDURE — 65660000000 HC RM CCU STEPDOWN

## 2019-01-01 PROCEDURE — 70551 MRI BRAIN STEM W/O DYE: CPT

## 2019-01-01 PROCEDURE — 82607 VITAMIN B-12: CPT

## 2019-01-01 PROCEDURE — 83735 ASSAY OF MAGNESIUM: CPT

## 2019-01-01 PROCEDURE — G0152 HHCP-SERV OF OT,EA 15 MIN: HCPCS

## 2019-01-01 PROCEDURE — 99284 EMERGENCY DEPT VISIT MOD MDM: CPT

## 2019-01-01 PROCEDURE — 93312 ECHO TRANSESOPHAGEAL: CPT

## 2019-01-01 PROCEDURE — 99218 HC RM OBSERVATION: CPT

## 2019-01-01 PROCEDURE — 73630 X-RAY EXAM OF FOOT: CPT

## 2019-01-01 PROCEDURE — 93005 ELECTROCARDIOGRAM TRACING: CPT

## 2019-01-01 PROCEDURE — 85652 RBC SED RATE AUTOMATED: CPT

## 2019-01-01 PROCEDURE — 74011000258 HC RX REV CODE- 258: Performed by: HOSPITALIST

## 2019-01-01 PROCEDURE — 77010033678 HC OXYGEN DAILY

## 2019-01-01 PROCEDURE — 70450 CT HEAD/BRAIN W/O DYE: CPT

## 2019-01-01 PROCEDURE — 94760 N-INVAS EAR/PLS OXIMETRY 1: CPT

## 2019-01-01 PROCEDURE — 97116 GAIT TRAINING THERAPY: CPT | Performed by: PHYSICAL THERAPIST

## 2019-01-01 PROCEDURE — 97162 PT EVAL MOD COMPLEX 30 MIN: CPT

## 2019-01-01 PROCEDURE — 97161 PT EVAL LOW COMPLEX 20 MIN: CPT | Performed by: PHYSICAL THERAPIST

## 2019-01-01 PROCEDURE — 96376 TX/PRO/DX INJ SAME DRUG ADON: CPT

## 2019-01-01 PROCEDURE — 93880 EXTRACRANIAL BILAT STUDY: CPT

## 2019-01-01 PROCEDURE — 74011000258 HC RX REV CODE- 258: Performed by: EMERGENCY MEDICINE

## 2019-01-01 PROCEDURE — 74011250636 HC RX REV CODE- 250/636: Performed by: PHYSICIAN ASSISTANT

## 2019-01-01 PROCEDURE — 99152 MOD SED SAME PHYS/QHP 5/>YRS: CPT

## 2019-01-01 PROCEDURE — 99153 MOD SED SAME PHYS/QHP EA: CPT

## 2019-01-01 PROCEDURE — 95816 EEG AWAKE AND DROWSY: CPT | Performed by: PSYCHIATRY & NEUROLOGY

## 2019-01-01 PROCEDURE — 97530 THERAPEUTIC ACTIVITIES: CPT

## 2019-01-01 PROCEDURE — G0151 HHCP-SERV OF PT,EA 15 MIN: HCPCS

## 2019-01-01 PROCEDURE — 70547 MR ANGIOGRAPHY NECK W/O DYE: CPT

## 2019-01-01 PROCEDURE — 74011250637 HC RX REV CODE- 250/637: Performed by: INTERNAL MEDICINE

## 2019-01-01 PROCEDURE — 65270000029 HC RM PRIVATE

## 2019-01-01 PROCEDURE — 76770 US EXAM ABDO BACK WALL COMP: CPT

## 2019-01-01 PROCEDURE — 82306 VITAMIN D 25 HYDROXY: CPT

## 2019-01-01 PROCEDURE — C1764 EVENT RECORDER, CARDIAC: HCPCS | Performed by: INTERNAL MEDICINE

## 2019-01-01 PROCEDURE — G0300 HHS/HOSPICE OF LPN EA 15 MIN: HCPCS

## 2019-01-01 PROCEDURE — 97165 OT EVAL LOW COMPLEX 30 MIN: CPT

## 2019-01-01 PROCEDURE — 80048 BASIC METABOLIC PNL TOTAL CA: CPT

## 2019-01-01 PROCEDURE — 96374 THER/PROPH/DIAG INJ IV PUSH: CPT

## 2019-01-01 PROCEDURE — 93306 TTE W/DOPPLER COMPLETE: CPT

## 2019-01-01 PROCEDURE — 84484 ASSAY OF TROPONIN QUANT: CPT

## 2019-01-01 PROCEDURE — B24BZZ4 ULTRASONOGRAPHY OF HEART WITH AORTA, TRANSESOPHAGEAL: ICD-10-PCS | Performed by: INTERNAL MEDICINE

## 2019-01-01 PROCEDURE — 71046 X-RAY EXAM CHEST 2 VIEWS: CPT

## 2019-01-01 PROCEDURE — 87086 URINE CULTURE/COLONY COUNT: CPT

## 2019-01-01 PROCEDURE — 99285 EMERGENCY DEPT VISIT HI MDM: CPT

## 2019-01-01 PROCEDURE — 83036 HEMOGLOBIN GLYCOSYLATED A1C: CPT

## 2019-01-01 PROCEDURE — 74011250636 HC RX REV CODE- 250/636: Performed by: INTERNAL MEDICINE

## 2019-01-01 PROCEDURE — 74011250636 HC RX REV CODE- 250/636: Performed by: EMERGENCY MEDICINE

## 2019-01-01 PROCEDURE — 70496 CT ANGIOGRAPHY HEAD: CPT

## 2019-01-01 PROCEDURE — 87040 BLOOD CULTURE FOR BACTERIA: CPT

## 2019-01-01 PROCEDURE — 85025 COMPLETE CBC W/AUTO DIFF WBC: CPT

## 2019-01-01 PROCEDURE — 84443 ASSAY THYROID STIM HORMONE: CPT

## 2019-01-01 PROCEDURE — 96360 HYDRATION IV INFUSION INIT: CPT

## 2019-01-01 PROCEDURE — 97116 GAIT TRAINING THERAPY: CPT

## 2019-01-01 PROCEDURE — 74011250637 HC RX REV CODE- 250/637: Performed by: PHYSICIAN ASSISTANT

## 2019-01-01 PROCEDURE — 86038 ANTINUCLEAR ANTIBODIES: CPT

## 2019-01-01 PROCEDURE — 96375 TX/PRO/DX INJ NEW DRUG ADDON: CPT

## 2019-01-01 PROCEDURE — 74011636320 HC RX REV CODE- 636/320: Performed by: INTERNAL MEDICINE

## 2019-01-01 PROCEDURE — 97535 SELF CARE MNGMENT TRAINING: CPT | Performed by: OCCUPATIONAL THERAPIST

## 2019-01-01 PROCEDURE — 84100 ASSAY OF PHOSPHORUS: CPT

## 2019-01-01 PROCEDURE — 70544 MR ANGIOGRAPHY HEAD W/O DYE: CPT

## 2019-01-01 PROCEDURE — 72125 CT NECK SPINE W/O DYE: CPT

## 2019-01-01 PROCEDURE — 83090 ASSAY OF HOMOCYSTEINE: CPT

## 2019-01-01 PROCEDURE — 96361 HYDRATE IV INFUSION ADD-ON: CPT

## 2019-01-01 PROCEDURE — 97166 OT EVAL MOD COMPLEX 45 MIN: CPT | Performed by: OCCUPATIONAL THERAPIST

## 2019-01-01 PROCEDURE — 33285 INSJ SUBQ CAR RHYTHM MNTR: CPT | Performed by: INTERNAL MEDICINE

## 2019-01-01 PROCEDURE — 80061 LIPID PANEL: CPT

## 2019-01-01 DEVICE — RECORDER CARD MON REVEAL LINQ MYCARELINK IMPL LINQSYS: Type: IMPLANTABLE DEVICE | Site: CHEST | Status: FUNCTIONAL

## 2019-01-01 RX ORDER — ONDANSETRON 2 MG/ML
4 INJECTION INTRAMUSCULAR; INTRAVENOUS
Status: COMPLETED | OUTPATIENT
Start: 2019-01-01 | End: 2019-01-01

## 2019-01-01 RX ORDER — GUAIFENESIN 600 MG/1
600 TABLET, EXTENDED RELEASE ORAL 2 TIMES DAILY
Qty: 60 TAB | Refills: 2 | Status: SHIPPED | OUTPATIENT
Start: 2019-01-01 | End: 2020-01-01

## 2019-01-01 RX ORDER — ENOXAPARIN SODIUM 100 MG/ML
40 INJECTION SUBCUTANEOUS DAILY
Status: DISCONTINUED | OUTPATIENT
Start: 2019-01-01 | End: 2019-01-01 | Stop reason: HOSPADM

## 2019-01-01 RX ORDER — LIDOCAINE HYDROCHLORIDE AND EPINEPHRINE 10; 10 MG/ML; UG/ML
INJECTION, SOLUTION INFILTRATION; PERINEURAL AS NEEDED
Status: DISCONTINUED | OUTPATIENT
Start: 2019-01-01 | End: 2019-01-01 | Stop reason: HOSPADM

## 2019-01-01 RX ORDER — LOSARTAN POTASSIUM 25 MG/1
25 TABLET ORAL DAILY
Qty: 90 TAB | Refills: 1 | Status: SHIPPED | OUTPATIENT
Start: 2019-01-01 | End: 2019-01-01 | Stop reason: SDUPTHER

## 2019-01-01 RX ORDER — HYDROCODONE BITARTRATE AND ACETAMINOPHEN 5; 325 MG/1; MG/1
1 TABLET ORAL
Status: DISCONTINUED | OUTPATIENT
Start: 2019-01-01 | End: 2019-01-01 | Stop reason: HOSPADM

## 2019-01-01 RX ORDER — PANTOPRAZOLE SODIUM 40 MG/1
40 TABLET, DELAYED RELEASE ORAL DAILY
Status: DISCONTINUED | OUTPATIENT
Start: 2019-01-01 | End: 2019-01-01 | Stop reason: HOSPADM

## 2019-01-01 RX ORDER — EZETIMIBE 10 MG/1
10 TABLET ORAL DAILY
Status: DISCONTINUED | OUTPATIENT
Start: 2019-01-01 | End: 2019-01-01 | Stop reason: HOSPADM

## 2019-01-01 RX ORDER — ERGOCALCIFEROL 1.25 MG/1
50000 CAPSULE ORAL
Qty: 12 CAP | Refills: 1 | Status: SHIPPED | OUTPATIENT
Start: 2019-01-01 | End: 2020-01-01

## 2019-01-01 RX ORDER — DEXTROSE MONOHYDRATE 100 MG/ML
125-250 INJECTION, SOLUTION INTRAVENOUS AS NEEDED
Status: DISCONTINUED | OUTPATIENT
Start: 2019-01-01 | End: 2019-01-01 | Stop reason: HOSPADM

## 2019-01-01 RX ORDER — LOSARTAN POTASSIUM 50 MG/1
50 TABLET ORAL DAILY
Qty: 30 TAB | Refills: 2 | Status: SHIPPED | OUTPATIENT
Start: 2019-01-01 | End: 2019-01-01

## 2019-01-01 RX ORDER — HYDROCODONE BITARTRATE AND ACETAMINOPHEN 5; 325 MG/1; MG/1
1 TABLET ORAL
Qty: 15 TAB | Refills: 0 | Status: SHIPPED | OUTPATIENT
Start: 2019-01-01 | End: 2019-01-01

## 2019-01-01 RX ORDER — LOSARTAN POTASSIUM 50 MG/1
TABLET ORAL
Refills: 2 | COMMUNITY
Start: 2019-01-01 | End: 2019-01-01

## 2019-01-01 RX ORDER — CLOPIDOGREL BISULFATE 75 MG/1
75 TABLET ORAL DAILY
Status: DISCONTINUED | OUTPATIENT
Start: 2019-01-01 | End: 2019-01-01 | Stop reason: HOSPADM

## 2019-01-01 RX ORDER — AMLODIPINE BESYLATE 5 MG/1
5 TABLET ORAL
Status: COMPLETED | OUTPATIENT
Start: 2019-01-01 | End: 2019-01-01

## 2019-01-01 RX ORDER — MOMETASONE FUROATE 220 UG/1
INHALANT RESPIRATORY (INHALATION)
Refills: 1 | COMMUNITY
Start: 2019-01-01 | End: 2020-01-01

## 2019-01-01 RX ORDER — INSULIN LISPRO 100 [IU]/ML
4 INJECTION, SOLUTION INTRAVENOUS; SUBCUTANEOUS ONCE
Status: COMPLETED | OUTPATIENT
Start: 2019-01-01 | End: 2019-01-01

## 2019-01-01 RX ORDER — SODIUM CHLORIDE 0.9 % (FLUSH) 0.9 %
5-40 SYRINGE (ML) INJECTION AS NEEDED
Status: DISCONTINUED | OUTPATIENT
Start: 2019-01-01 | End: 2019-01-01 | Stop reason: HOSPADM

## 2019-01-01 RX ORDER — ATORVASTATIN CALCIUM 40 MG/1
80 TABLET, FILM COATED ORAL
Status: DISCONTINUED | OUTPATIENT
Start: 2019-01-01 | End: 2019-01-01 | Stop reason: HOSPADM

## 2019-01-01 RX ORDER — ACETAMINOPHEN 325 MG/1
650 TABLET ORAL
Status: DISCONTINUED | OUTPATIENT
Start: 2019-01-01 | End: 2019-01-01 | Stop reason: HOSPADM

## 2019-01-01 RX ORDER — SODIUM CHLORIDE 9 MG/ML
50 INJECTION, SOLUTION INTRAVENOUS CONTINUOUS
Status: DISCONTINUED | OUTPATIENT
Start: 2019-01-01 | End: 2019-01-01 | Stop reason: HOSPADM

## 2019-01-01 RX ORDER — MAGNESIUM SULFATE 100 %
4 CRYSTALS MISCELLANEOUS AS NEEDED
Status: DISCONTINUED | OUTPATIENT
Start: 2019-01-01 | End: 2019-01-01 | Stop reason: HOSPADM

## 2019-01-01 RX ORDER — SODIUM CHLORIDE 0.9 % (FLUSH) 0.9 %
10 SYRINGE (ML) INJECTION
Status: COMPLETED | OUTPATIENT
Start: 2019-01-01 | End: 2019-01-01

## 2019-01-01 RX ORDER — MECLIZINE HCL 12.5 MG 12.5 MG/1
25 TABLET ORAL EVERY 6 HOURS
Status: DISCONTINUED | OUTPATIENT
Start: 2019-01-01 | End: 2019-01-01 | Stop reason: HOSPADM

## 2019-01-01 RX ORDER — GUAIFENESIN 600 MG/1
600 TABLET, EXTENDED RELEASE ORAL 2 TIMES DAILY
Qty: 20 TAB | Refills: 0 | Status: SHIPPED | OUTPATIENT
Start: 2019-01-01 | End: 2019-01-01

## 2019-01-01 RX ORDER — ONDANSETRON 2 MG/ML
4 INJECTION INTRAMUSCULAR; INTRAVENOUS
Status: DISCONTINUED | OUTPATIENT
Start: 2019-01-01 | End: 2019-01-01 | Stop reason: HOSPADM

## 2019-01-01 RX ORDER — HYDRALAZINE HYDROCHLORIDE 20 MG/ML
10 INJECTION INTRAMUSCULAR; INTRAVENOUS
Status: DISCONTINUED | OUTPATIENT
Start: 2019-01-01 | End: 2019-01-01 | Stop reason: HOSPADM

## 2019-01-01 RX ORDER — SODIUM CHLORIDE 0.9 % (FLUSH) 0.9 %
5-40 SYRINGE (ML) INJECTION EVERY 8 HOURS
Status: DISCONTINUED | OUTPATIENT
Start: 2019-01-01 | End: 2019-01-01 | Stop reason: HOSPADM

## 2019-01-01 RX ORDER — HYDROCODONE BITARTRATE AND ACETAMINOPHEN 5; 325 MG/1; MG/1
1 TABLET ORAL
Qty: 14 TAB | Refills: 0 | Status: SHIPPED | OUTPATIENT
Start: 2019-01-01 | End: 2019-01-01

## 2019-01-01 RX ORDER — ALBUTEROL SULFATE 1.25 MG/3ML
1.25 SOLUTION RESPIRATORY (INHALATION)
COMMUNITY
Start: 2014-09-15 | End: 2020-01-01

## 2019-01-01 RX ORDER — GABAPENTIN 300 MG/1
300 CAPSULE ORAL
Status: DISCONTINUED | OUTPATIENT
Start: 2019-01-01 | End: 2019-01-01 | Stop reason: HOSPADM

## 2019-01-01 RX ORDER — INSULIN LISPRO 100 [IU]/ML
INJECTION, SOLUTION INTRAVENOUS; SUBCUTANEOUS
Status: DISCONTINUED | OUTPATIENT
Start: 2019-01-01 | End: 2019-01-01 | Stop reason: HOSPADM

## 2019-01-01 RX ORDER — HYDROCODONE BITARTRATE AND ACETAMINOPHEN 5; 325 MG/1; MG/1
1 TABLET ORAL
Qty: 30 TAB | Refills: 0 | Status: SHIPPED | OUTPATIENT
Start: 2019-01-01 | End: 2019-01-01

## 2019-01-01 RX ORDER — ACETAMINOPHEN 325 MG/1
650 TABLET ORAL
Status: DISCONTINUED | OUTPATIENT
Start: 2019-01-01 | End: 2019-01-01

## 2019-01-01 RX ORDER — BUTALBITAL, ACETAMINOPHEN AND CAFFEINE 50; 325; 40 MG/1; MG/1; MG/1
1 TABLET ORAL
Status: COMPLETED | OUTPATIENT
Start: 2019-01-01 | End: 2019-01-01

## 2019-01-01 RX ORDER — AZITHROMYCIN 250 MG/1
TABLET, FILM COATED ORAL
Qty: 6 TAB | Refills: 0 | Status: SHIPPED | OUTPATIENT
Start: 2019-01-01 | End: 2019-01-01

## 2019-01-01 RX ORDER — ENOXAPARIN SODIUM 100 MG/ML
40 INJECTION SUBCUTANEOUS EVERY 24 HOURS
Status: DISCONTINUED | OUTPATIENT
Start: 2019-01-01 | End: 2019-01-01 | Stop reason: HOSPADM

## 2019-01-01 RX ORDER — PANTOPRAZOLE SODIUM 40 MG/1
40 TABLET, DELAYED RELEASE ORAL
Status: DISCONTINUED | OUTPATIENT
Start: 2019-01-01 | End: 2019-01-01 | Stop reason: HOSPADM

## 2019-01-01 RX ORDER — ACETAMINOPHEN 650 MG/1
650 SUPPOSITORY RECTAL
Status: DISCONTINUED | OUTPATIENT
Start: 2019-01-01 | End: 2019-01-01 | Stop reason: HOSPADM

## 2019-01-01 RX ORDER — AMLODIPINE BESYLATE 5 MG/1
10 TABLET ORAL DAILY
Status: DISCONTINUED | OUTPATIENT
Start: 2019-01-01 | End: 2019-01-01 | Stop reason: HOSPADM

## 2019-01-01 RX ORDER — AMOXICILLIN 250 MG
1 CAPSULE ORAL 2 TIMES DAILY
Status: DISCONTINUED | OUTPATIENT
Start: 2019-01-01 | End: 2019-01-01 | Stop reason: HOSPADM

## 2019-01-01 RX ORDER — CLOPIDOGREL BISULFATE 75 MG/1
75 TABLET ORAL DAILY
Qty: 30 TAB | Refills: 5 | Status: SHIPPED | OUTPATIENT
Start: 2019-01-01

## 2019-01-01 RX ORDER — PREDNISONE 20 MG/1
20 TABLET ORAL
Qty: 10 TAB | Refills: 0 | Status: SHIPPED | OUTPATIENT
Start: 2019-01-01 | End: 2019-01-01

## 2019-01-01 RX ORDER — MORPHINE SULFATE 2 MG/ML
2 INJECTION, SOLUTION INTRAMUSCULAR; INTRAVENOUS
Status: COMPLETED | OUTPATIENT
Start: 2019-01-01 | End: 2019-01-01

## 2019-01-01 RX ORDER — LOSARTAN POTASSIUM 25 MG/1
25 TABLET ORAL DAILY
Status: DISCONTINUED | OUTPATIENT
Start: 2019-01-01 | End: 2019-01-01 | Stop reason: HOSPADM

## 2019-01-01 RX ORDER — GUAIFENESIN 100 MG/5ML
81 LIQUID (ML) ORAL DAILY
Qty: 30 TAB | Refills: 0 | Status: SHIPPED | OUTPATIENT
Start: 2019-01-01 | End: 2020-01-01

## 2019-01-01 RX ORDER — ALBUTEROL SULFATE 0.83 MG/ML
1.25 SOLUTION RESPIRATORY (INHALATION)
Status: DISCONTINUED | OUTPATIENT
Start: 2019-01-01 | End: 2019-01-01 | Stop reason: HOSPADM

## 2019-01-01 RX ORDER — MECLIZINE HYDROCHLORIDE 25 MG/1
25 TABLET ORAL EVERY 6 HOURS
Qty: 40 TAB | Refills: 0 | Status: SHIPPED | OUTPATIENT
Start: 2019-01-01 | End: 2019-01-01

## 2019-01-01 RX ORDER — GUAIFENESIN 100 MG/5ML
81 LIQUID (ML) ORAL DAILY
Status: DISCONTINUED | OUTPATIENT
Start: 2019-01-01 | End: 2019-01-01 | Stop reason: HOSPADM

## 2019-01-01 RX ORDER — PREDNISONE 10 MG/1
TABLET ORAL
Qty: 12 TAB | Refills: 0 | Status: SHIPPED | OUTPATIENT
Start: 2019-01-01 | End: 2019-01-01 | Stop reason: ALTCHOICE

## 2019-01-01 RX ORDER — CLOPIDOGREL BISULFATE 75 MG/1
75 TABLET ORAL DAILY
Qty: 30 TAB | Refills: 0 | Status: SHIPPED | OUTPATIENT
Start: 2019-01-01 | End: 2019-01-01

## 2019-01-01 RX ORDER — CITALOPRAM 20 MG/1
20 TABLET, FILM COATED ORAL DAILY
Status: DISCONTINUED | OUTPATIENT
Start: 2019-01-01 | End: 2019-01-01 | Stop reason: HOSPADM

## 2019-01-01 RX ORDER — GUAIFENESIN, PSEUDOEPHEDRINE HYDROCHLORIDE 600; 60 MG/1; MG/1
1 TABLET, EXTENDED RELEASE ORAL EVERY 12 HOURS
Qty: 14 TAB | Refills: 0 | Status: SHIPPED | OUTPATIENT
Start: 2019-01-01 | End: 2019-01-01 | Stop reason: CLARIF

## 2019-01-01 RX ORDER — OXYCODONE HYDROCHLORIDE 5 MG/1
5 TABLET ORAL
Status: DISCONTINUED | OUTPATIENT
Start: 2019-01-01 | End: 2019-01-01 | Stop reason: HOSPADM

## 2019-01-01 RX ORDER — DEXTROSE MONOHYDRATE 25 G/50ML
12.5-25 INJECTION, SOLUTION INTRAVENOUS AS NEEDED
Status: DISCONTINUED | OUTPATIENT
Start: 2019-01-01 | End: 2019-01-01 | Stop reason: HOSPADM

## 2019-01-01 RX ORDER — MIDAZOLAM HYDROCHLORIDE 1 MG/ML
.5-2 INJECTION, SOLUTION INTRAMUSCULAR; INTRAVENOUS
Status: DISCONTINUED | OUTPATIENT
Start: 2019-01-01 | End: 2019-01-01

## 2019-01-01 RX ORDER — HYDROCODONE BITARTRATE AND ACETAMINOPHEN 5; 325 MG/1; MG/1
1 TABLET ORAL
COMMUNITY
End: 2019-01-01 | Stop reason: SDUPTHER

## 2019-01-01 RX ORDER — PREDNISONE 5 MG/1
20 TABLET ORAL
Status: DISCONTINUED | OUTPATIENT
Start: 2019-01-01 | End: 2019-01-01 | Stop reason: HOSPADM

## 2019-01-01 RX ORDER — GLIPIZIDE 5 MG/1
5 TABLET ORAL
COMMUNITY
End: 2019-01-01

## 2019-01-01 RX ORDER — INSULIN LISPRO 100 [IU]/ML
INJECTION, SOLUTION INTRAVENOUS; SUBCUTANEOUS
Status: DISCONTINUED | OUTPATIENT
Start: 2019-01-01 | End: 2019-01-01

## 2019-01-01 RX ORDER — LIDOCAINE HYDROCHLORIDE 20 MG/ML
15 SOLUTION OROPHARYNGEAL AS NEEDED
Status: DISCONTINUED | OUTPATIENT
Start: 2019-01-01 | End: 2019-01-01

## 2019-01-01 RX ORDER — BUTALBITAL, ACETAMINOPHEN AND CAFFEINE 50; 325; 40 MG/1; MG/1; MG/1
1 TABLET ORAL
Status: DISCONTINUED | OUTPATIENT
Start: 2019-01-01 | End: 2019-01-01 | Stop reason: HOSPADM

## 2019-01-01 RX ORDER — FENTANYL CITRATE 50 UG/ML
12.5-5 INJECTION, SOLUTION INTRAMUSCULAR; INTRAVENOUS
Status: DISCONTINUED | OUTPATIENT
Start: 2019-01-01 | End: 2019-01-01

## 2019-01-01 RX ADMIN — SODIUM CHLORIDE 50 ML/HR: 900 INJECTION, SOLUTION INTRAVENOUS at 18:49

## 2019-01-01 RX ADMIN — MECLIZINE 25 MG: 12.5 TABLET ORAL at 11:46

## 2019-01-01 RX ADMIN — CITALOPRAM HYDROBROMIDE 20 MG: 20 TABLET ORAL at 08:10

## 2019-01-01 RX ADMIN — CEFTRIAXONE 1 G: 1 INJECTION, POWDER, FOR SOLUTION INTRAMUSCULAR; INTRAVENOUS at 23:25

## 2019-01-01 RX ADMIN — CITALOPRAM HYDROBROMIDE 20 MG: 20 TABLET ORAL at 08:46

## 2019-01-01 RX ADMIN — Medication 10 ML: at 18:49

## 2019-01-01 RX ADMIN — HUMAN INSULIN 30 UNITS: 100 INJECTION, SUSPENSION SUBCUTANEOUS at 21:39

## 2019-01-01 RX ADMIN — MECLIZINE 25 MG: 12.5 TABLET ORAL at 02:05

## 2019-01-01 RX ADMIN — INSULIN LISPRO 4 UNITS: 100 INJECTION, SOLUTION INTRAVENOUS; SUBCUTANEOUS at 00:45

## 2019-01-01 RX ADMIN — ATORVASTATIN CALCIUM 80 MG: 40 TABLET, FILM COATED ORAL at 21:12

## 2019-01-01 RX ADMIN — Medication 10 ML: at 23:25

## 2019-01-01 RX ADMIN — ENOXAPARIN SODIUM 40 MG: 40 INJECTION SUBCUTANEOUS at 08:10

## 2019-01-01 RX ADMIN — MORPHINE SULFATE 2 MG: 2 INJECTION, SOLUTION INTRAMUSCULAR; INTRAVENOUS at 12:57

## 2019-01-01 RX ADMIN — SODIUM CHLORIDE 50 ML/HR: 900 INJECTION, SOLUTION INTRAVENOUS at 02:53

## 2019-01-01 RX ADMIN — ATORVASTATIN CALCIUM 80 MG: 40 TABLET, FILM COATED ORAL at 22:20

## 2019-01-01 RX ADMIN — CITALOPRAM HYDROBROMIDE 20 MG: 20 TABLET ORAL at 08:01

## 2019-01-01 RX ADMIN — ONDANSETRON 4 MG: 2 INJECTION INTRAMUSCULAR; INTRAVENOUS at 07:59

## 2019-01-01 RX ADMIN — HUMAN INSULIN 30 UNITS: 100 INJECTION, SUSPENSION SUBCUTANEOUS at 08:46

## 2019-01-01 RX ADMIN — ENOXAPARIN SODIUM 40 MG: 40 INJECTION SUBCUTANEOUS at 09:29

## 2019-01-01 RX ADMIN — SODIUM CHLORIDE 50 ML/HR: 900 INJECTION, SOLUTION INTRAVENOUS at 05:17

## 2019-01-01 RX ADMIN — GABAPENTIN 300 MG: 300 CAPSULE ORAL at 21:36

## 2019-01-01 RX ADMIN — Medication 10 ML: at 02:49

## 2019-01-01 RX ADMIN — PANTOPRAZOLE SODIUM 40 MG: 40 TABLET, DELAYED RELEASE ORAL at 08:10

## 2019-01-01 RX ADMIN — FLUTICASONE FUROATE 1 PUFF: 100 POWDER RESPIRATORY (INHALATION) at 09:28

## 2019-01-01 RX ADMIN — INSULIN LISPRO 2 UNITS: 100 INJECTION, SOLUTION INTRAVENOUS; SUBCUTANEOUS at 17:35

## 2019-01-01 RX ADMIN — EZETIMIBE 10 MG: 10 TABLET ORAL at 08:46

## 2019-01-01 RX ADMIN — INSULIN LISPRO 2 UNITS: 100 INJECTION, SOLUTION INTRAVENOUS; SUBCUTANEOUS at 21:13

## 2019-01-01 RX ADMIN — ENOXAPARIN SODIUM 40 MG: 40 INJECTION SUBCUTANEOUS at 08:00

## 2019-01-01 RX ADMIN — Medication 10 ML: at 21:13

## 2019-01-01 RX ADMIN — ASPIRIN 81 MG 81 MG: 81 TABLET ORAL at 08:00

## 2019-01-01 RX ADMIN — INSULIN LISPRO 3 UNITS: 100 INJECTION, SOLUTION INTRAVENOUS; SUBCUTANEOUS at 09:29

## 2019-01-01 RX ADMIN — SENNOSIDES,DOCUSATE SODIUM 1 TABLET: 8.6; 5 TABLET, FILM COATED ORAL at 08:46

## 2019-01-01 RX ADMIN — EZETIMIBE 10 MG: 10 TABLET ORAL at 10:20

## 2019-01-01 RX ADMIN — OXYCODONE HYDROCHLORIDE 5 MG: 5 TABLET ORAL at 18:39

## 2019-01-01 RX ADMIN — SENNOSIDES,DOCUSATE SODIUM 1 TABLET: 8.6; 5 TABLET, FILM COATED ORAL at 17:03

## 2019-01-01 RX ADMIN — Medication 10 ML: at 14:49

## 2019-01-01 RX ADMIN — EZETIMIBE 10 MG: 10 TABLET ORAL at 08:00

## 2019-01-01 RX ADMIN — Medication 10 ML: at 02:05

## 2019-01-01 RX ADMIN — FLUTICASONE FUROATE 1 PUFF: 100 POWDER RESPIRATORY (INHALATION) at 08:46

## 2019-01-01 RX ADMIN — Medication 10 ML: at 11:50

## 2019-01-01 RX ADMIN — LOSARTAN POTASSIUM 25 MG: 25 TABLET ORAL at 08:46

## 2019-01-01 RX ADMIN — Medication 10 ML: at 22:21

## 2019-01-01 RX ADMIN — HUMAN INSULIN 15 UNITS: 100 INJECTION, SUSPENSION SUBCUTANEOUS at 08:10

## 2019-01-01 RX ADMIN — MECLIZINE 25 MG: 12.5 TABLET ORAL at 17:16

## 2019-01-01 RX ADMIN — MECLIZINE 25 MG: 12.5 TABLET ORAL at 17:35

## 2019-01-01 RX ADMIN — BUTALBITAL, ACETAMINOPHEN AND CAFFEINE 1 TABLET: 50; 325; 40 TABLET ORAL at 12:31

## 2019-01-01 RX ADMIN — PREDNISONE 20 MG: 5 TABLET ORAL at 11:46

## 2019-01-01 RX ADMIN — HUMAN INSULIN 15 UNITS: 100 INJECTION, SUSPENSION SUBCUTANEOUS at 07:59

## 2019-01-01 RX ADMIN — FENTANYL CITRATE 25 MCG: 50 INJECTION, SOLUTION INTRAMUSCULAR; INTRAVENOUS at 14:49

## 2019-01-01 RX ADMIN — CITALOPRAM HYDROBROMIDE 20 MG: 20 TABLET ORAL at 09:28

## 2019-01-01 RX ADMIN — BUTALBITAL, ACETAMINOPHEN AND CAFFEINE 1 TABLET: 50; 325; 40 TABLET ORAL at 14:46

## 2019-01-01 RX ADMIN — HUMAN INSULIN 15 UNITS: 100 INJECTION, SUSPENSION SUBCUTANEOUS at 17:15

## 2019-01-01 RX ADMIN — LIDOCAINE HYDROCHLORIDE 15 ML: 20 SOLUTION ORAL; TOPICAL at 14:45

## 2019-01-01 RX ADMIN — ATORVASTATIN CALCIUM 80 MG: 40 TABLET, FILM COATED ORAL at 21:48

## 2019-01-01 RX ADMIN — GABAPENTIN 300 MG: 300 CAPSULE ORAL at 21:48

## 2019-01-01 RX ADMIN — CITALOPRAM HYDROBROMIDE 20 MG: 20 TABLET ORAL at 10:20

## 2019-01-01 RX ADMIN — AMLODIPINE BESYLATE 5 MG: 5 TABLET ORAL at 18:39

## 2019-01-01 RX ADMIN — INSULIN LISPRO 5 UNITS: 100 INJECTION, SOLUTION INTRAVENOUS; SUBCUTANEOUS at 18:48

## 2019-01-01 RX ADMIN — HUMAN INSULIN 30 UNITS: 100 INJECTION, SUSPENSION SUBCUTANEOUS at 09:29

## 2019-01-01 RX ADMIN — DEXTROSE MONOHYDRATE 125 ML: 10 INJECTION, SOLUTION INTRAVENOUS at 07:11

## 2019-01-01 RX ADMIN — INSULIN LISPRO 3 UNITS: 100 INJECTION, SOLUTION INTRAVENOUS; SUBCUTANEOUS at 02:47

## 2019-01-01 RX ADMIN — ASPIRIN 81 MG 81 MG: 81 TABLET ORAL at 09:28

## 2019-01-01 RX ADMIN — SODIUM CHLORIDE 500 ML: 900 INJECTION, SOLUTION INTRAVENOUS at 20:52

## 2019-01-01 RX ADMIN — MIDAZOLAM HYDROCHLORIDE 1 MG: 1 INJECTION, SOLUTION INTRAMUSCULAR; INTRAVENOUS at 14:47

## 2019-01-01 RX ADMIN — SODIUM CHLORIDE 500 ML: 900 INJECTION, SOLUTION INTRAVENOUS at 13:35

## 2019-01-01 RX ADMIN — Medication 10 ML: at 15:14

## 2019-01-01 RX ADMIN — MECLIZINE 25 MG: 12.5 TABLET ORAL at 06:05

## 2019-01-01 RX ADMIN — GABAPENTIN 300 MG: 300 CAPSULE ORAL at 21:13

## 2019-01-01 RX ADMIN — MORPHINE SULFATE 2 MG: 2 INJECTION, SOLUTION INTRAMUSCULAR; INTRAVENOUS at 15:29

## 2019-01-01 RX ADMIN — ENOXAPARIN SODIUM 40 MG: 40 INJECTION SUBCUTANEOUS at 08:46

## 2019-01-01 RX ADMIN — MECLIZINE 25 MG: 12.5 TABLET ORAL at 00:00

## 2019-01-01 RX ADMIN — PANTOPRAZOLE SODIUM 40 MG: 40 TABLET, DELAYED RELEASE ORAL at 09:28

## 2019-01-01 RX ADMIN — HUMAN INSULIN 15 UNITS: 100 INJECTION, SUSPENSION SUBCUTANEOUS at 18:48

## 2019-01-01 RX ADMIN — ENOXAPARIN SODIUM 40 MG: 40 INJECTION SUBCUTANEOUS at 10:20

## 2019-01-01 RX ADMIN — CLOPIDOGREL BISULFATE 75 MG: 75 TABLET ORAL at 08:46

## 2019-01-01 RX ADMIN — ASPIRIN 81 MG 81 MG: 81 TABLET ORAL at 08:10

## 2019-01-01 RX ADMIN — PREDNISONE 20 MG: 5 TABLET ORAL at 08:10

## 2019-01-01 RX ADMIN — MIDAZOLAM HYDROCHLORIDE 1 MG: 1 INJECTION, SOLUTION INTRAMUSCULAR; INTRAVENOUS at 14:58

## 2019-01-01 RX ADMIN — EZETIMIBE 10 MG: 10 TABLET ORAL at 09:28

## 2019-01-01 RX ADMIN — AMLODIPINE BESYLATE 10 MG: 5 TABLET ORAL at 08:10

## 2019-01-01 RX ADMIN — EZETIMIBE 10 MG: 10 TABLET ORAL at 08:10

## 2019-01-01 RX ADMIN — GABAPENTIN 300 MG: 300 CAPSULE ORAL at 22:20

## 2019-01-01 RX ADMIN — INSULIN LISPRO 3 UNITS: 100 INJECTION, SOLUTION INTRAVENOUS; SUBCUTANEOUS at 08:11

## 2019-01-01 RX ADMIN — CLOPIDOGREL BISULFATE 75 MG: 75 TABLET ORAL at 09:28

## 2019-01-01 RX ADMIN — HUMAN INSULIN 15 UNITS: 100 INJECTION, SUSPENSION SUBCUTANEOUS at 17:37

## 2019-01-01 RX ADMIN — PANTOPRAZOLE SODIUM 40 MG: 40 TABLET, DELAYED RELEASE ORAL at 08:46

## 2019-01-01 RX ADMIN — ONDANSETRON 4 MG: 2 INJECTION INTRAMUSCULAR; INTRAVENOUS at 12:57

## 2019-01-01 RX ADMIN — INSULIN LISPRO 2 UNITS: 100 INJECTION, SOLUTION INTRAVENOUS; SUBCUTANEOUS at 12:33

## 2019-01-01 RX ADMIN — BENZOCAINE, BUTAMBEN, AND TETRACAINE HYDROCHLORIDE 1 SPRAY: .028; .004; .004 AEROSOL, SPRAY TOPICAL at 14:46

## 2019-01-01 RX ADMIN — ASPIRIN 81 MG 81 MG: 81 TABLET ORAL at 10:20

## 2019-01-01 RX ADMIN — GABAPENTIN 300 MG: 300 CAPSULE ORAL at 00:45

## 2019-01-01 RX ADMIN — Medication 10 ML: at 06:06

## 2019-01-01 RX ADMIN — ATORVASTATIN CALCIUM 80 MG: 40 TABLET, FILM COATED ORAL at 02:39

## 2019-01-01 RX ADMIN — INSULIN LISPRO 5 UNITS: 100 INJECTION, SOLUTION INTRAVENOUS; SUBCUTANEOUS at 12:12

## 2019-01-01 RX ADMIN — ASPIRIN 81 MG 81 MG: 81 TABLET ORAL at 08:46

## 2019-01-01 RX ADMIN — PANTOPRAZOLE SODIUM 40 MG: 40 TABLET, DELAYED RELEASE ORAL at 08:01

## 2019-01-01 RX ADMIN — AMLODIPINE BESYLATE 10 MG: 5 TABLET ORAL at 08:00

## 2019-01-01 RX ADMIN — AMLODIPINE BESYLATE 10 MG: 5 TABLET ORAL at 10:20

## 2019-01-01 RX ADMIN — LOSARTAN POTASSIUM 25 MG: 25 TABLET ORAL at 09:28

## 2019-01-01 RX ADMIN — Medication 10 ML: at 06:57

## 2019-01-01 RX ADMIN — PANTOPRAZOLE SODIUM 40 MG: 40 TABLET, DELAYED RELEASE ORAL at 10:20

## 2019-01-01 RX ADMIN — ONDANSETRON 4 MG: 2 INJECTION INTRAMUSCULAR; INTRAVENOUS at 15:29

## 2019-01-01 RX ADMIN — Medication 10 ML: at 13:29

## 2019-01-01 RX ADMIN — ATORVASTATIN CALCIUM 80 MG: 40 TABLET, FILM COATED ORAL at 21:36

## 2019-01-01 RX ADMIN — PREDNISONE 20 MG: 5 TABLET ORAL at 17:35

## 2019-01-01 RX ADMIN — MECLIZINE 25 MG: 12.5 TABLET ORAL at 05:17

## 2019-01-01 RX ADMIN — IOPAMIDOL 100 ML: 755 INJECTION, SOLUTION INTRAVENOUS at 11:49

## 2019-01-01 RX ADMIN — Medication 10 ML: at 21:48

## 2019-01-01 RX ADMIN — Medication 10 ML: at 21:41

## 2019-01-03 ENCOUNTER — TELEPHONE (OUTPATIENT)
Dept: ENDOCRINOLOGY | Age: 84
End: 2019-01-03

## 2019-01-03 DIAGNOSIS — E11.21 TYPE 2 DIABETES MELLITUS WITH NEPHROPATHY (HCC): ICD-10-CM

## 2019-01-03 NOTE — TELEPHONE ENCOUNTER
Patient would like a refill on his insulin to be sent to [facility] [phone]. Patient was unsure of the name of the insulin.  He can be reached at [phone] if you have any questions

## 2019-01-03 NOTE — TELEPHONE ENCOUNTER
Mr. Digna Parsons stated that he is down to his last insulin pen and was told a few months ago that Dr. Clari Curry would be switching him to a NPH insulin. I asked Mr. Digna Parsons did he know that his PCP (Dr. Georjean Lombard) sent over a prescription for Humulin N NPH insulin on 12/4/18 and he replied \"no. \" Mr. Digna Parsons stated that he called [facility] and they stated that they did not have a prescription for his medication. I looked at the prescription written by Dr. Georjean Lombard and it looks like instead it saying \"normal\" under the class category, it says \"no print\" and therefore was not received by the pharmacy. Mr. Digna Parsons stated that he would like for Dr. Clari Curry to issue him a prescription for his insulin.

## 2019-01-08 ENCOUNTER — TELEPHONE (OUTPATIENT)
Dept: ENDOCRINOLOGY | Age: 84
End: 2019-01-08

## 2019-01-08 NOTE — TELEPHONE ENCOUNTER
Informed Mr. Browne Mesha of his dosage for his Humulin N insulin (50 units with breakfast and 55 units with dinner). Patient understood with no further questions.      Patient also stated that he will be making a follow up appointment to see Dr. Sandi Rodríguez

## 2019-02-20 NOTE — TELEPHONE ENCOUNTER
Spoke with Mr. Soledad Best and a [facility] worker by the name of Diego Thompson. Diego Thompson stated that Mr. Goodwin called on 1/8/19 asking how many units should he be taking for his Humulin N insulin. I then asked Mr. Soledad Best if he remembered speaking with me on 1/8/19 in regards to how many units Dr. Daron Morales prescribed him and if he also remembered telling me that he will make a follow up appointment to see Dr. Daron Morales. Mr. Soledad Best stated that he didn't remember our conversation from that day and it may be due to him possibly being \"half sleep. \"     Mr. Soledad Best then stated that he has only been injecting his Humulin N insulin once daily (about 2 AM) because he works overnight and sleeps throughout the day most of the time. He stated that his blood sugars have been low for a few weeks. I asked Mr. Soledad Best if he could provide me his blood sugar readings for the last 2 weeks. He was only able to provide me with 2 readings (67 and 88), but he could not tell me which days that they were low. Diego Thompson then informed him to try and write down his blood sugar readings along with the dates so that he can have them for his appointment with Dr. Daron Morales on March 7 at 8:30AM.     Mr. Soledad Best would like a call from Dr. Daron Morales to discuss what he should do as far as his insulin dosage being that he is only taking his insulin once a day instead of twice a day as prescribed.

## 2019-02-20 NOTE — TELEPHONE ENCOUNTER
Spoke to pt. He takes nph twice daily but when he takes 50 units at 9am he experiences low blood sugars at 3am the next morning so he doesn't take the second dose. Advised him to take 25 units twice daily instead of 50 units once daily

## 2019-03-07 NOTE — PROGRESS NOTES
Mr. Albania Arrieta returns for follow-up of his type 2 diabetes mellitus currently on NPH insulin 25 units twice daily. I have decreased his insulin significantly because he was having a lot of low blood sugars. He continues to work as a  usually from 3:30 in the afternoon to 11 at night. But because of staff shortages, he sometimes works from 3:30 in the afternoon to 730 the next morning. The job is entirely sedentary. He takes 25 units of NPH at about 9:00 in the morning and generally takes his second shot about 2:00 in the afternoon before he goes off to work. He lives alone so his only meal is a meal that he eats once he gets to work. He buys this meal from a [employer]. It usually is fried chicken with corn and potatoes and he drinks lemonade. He thinks his regular lemonade and not diet. He generally checks his blood sugar around 9 in the morning when he takes a shot of insulin and most of the blood sugars range between 150 and 220. Although he certainly not at goal, his hemoglobin A1c has decreased from 14% in December to 9.5% today. Examination  Blood pressure 128/91  Pulse 104  Weight 216    Impression type 2 diabetes mellitus with poor blood sugar control but improved. Plan: I have asked him to take his first dose at 9:00 in the morning as he has been doing but to take his insulin pen with him and to give the second dose of insulin at 9:00 at night. I think this will help smooth out his overall blood sugar profile. We will see him back in 3 months and repeat his A1c at that time. We spent more than 25 mintutes face to face, more than 50% was in counseling regarding diet, exercise and carbohydrate intake.

## 2019-03-08 NOTE — TELEPHONE ENCOUNTER
Spoke to Mr. Goodwin and informed him that I will be able to give him the information that Tucker Bowen from [facility] wanted in regards to his records. I informed him that without a fax from [employer] requesting records, I am unable to give out his information.  Suly Gio stated that he understood and will give the information to Tucker Bowen himself. No further actions required.

## 2019-03-08 NOTE — TELEPHONE ENCOUNTER
----- Message from Jasmine Nelson sent at 3/8/2019 12:57 PM EST -----  Regarding: /Amrita hogue case nurse manager from [facility] called requesting a call back in regards to when were the pt last pneumonia shot, pt recent A 1C and BP. Best contact number is [phone] ext [phone] .

## 2019-04-08 NOTE — PROGRESS NOTES
Chief Complaint Patient presents with  Diabetes  Hypertension 3 mo check 1. Have you been to the ER, urgent care clinic since your last visit? Hospitalized since your last visit? no 
 
2. Have you seen or consulted any other health care providers outside of the [address] [place] [address] since your last visit? Include any pap smears or colon screening. yes

## 2019-04-08 NOTE — TELEPHONE ENCOUNTER
Marisela nurse calling from [address] medications - would like a med list   Also would like a BP and A1c    Best number to reach her is [phone] ext [phone]  Fax [phone] to Mary Kay Noyola

## 2019-04-08 NOTE — PROGRESS NOTES
Dora Ramirez is a 80 y.o. male 
 
 has a past medical history of Abnormal prostate exam, ACP (advance care planning) (5/25/2017), Arthritis, Asbestosis(501), CAD (coronary artery disease), Calculus of kidney, Chronic pain syndrome (8/16/2018), CKD (chronic kidney disease) stage 3, GFR 30-59 ml/min ([facility]) (6/6/2016), CKD (chronic kidney disease) stage 3, GFR 30-59 ml/min ([facility]) (6/6/2016), Congestive heart failure ([facility] COPD, Depression, Essential hypertension with goal blood pressure less than 140/90 (6/6/2016), GERD (gastroesophageal reflux disease), Heart failure ([facility] Mild intermittent asthma with acute exacerbation (6/6/2016), Other \"heavy-for-dates\" infants, Other ill-defined conditions(799.89), PUD (peptic ulcer disease), Uncontrolled type II diabetes mellitus ([facility] (6/6/2016), and Urinary incontinence (4/3/2018). At our last visit in 12/2018, I had given him some labs and US renal with refer to Nephro he didn't do any of them. He continues to work as a  usually from 3:30 in the afternoon to 11 at night. But because of staff shortages, he sometimes works from 3:30 in the afternoon to 730 the next morning. The job is entirely sedentary His wife passed away on Thanks Giving. She had broken her leg before that, then s/p surgery, then heart failure while in rehab. His children are out of state. He lives alone. Cough he report chronic for years, slightly worse recently. Denies Fever, chills, SOB. Tend to be worse at night. Former smoker. He only have 2 inhalers albuterol and spiriva, he doesn't have a steroid inhaler. We'll get CXR. D/c lisinopril possible contributing to his cough. Recently he was put on a Prednisone 10mg taper course, tomorrow is 2 pills then 1 pill then stop. It has helped with his breathing. HTN: BP is at goal today, D/c Lisinopril 20mg. Start losartan 50mg HLD: crestor 40mg and zetia 10mg.  
   
CKD with nephropathy. Will get US renal. Refer to Nephro. I suspect due to his prolonged uncontrolled DM2. Chronic pain and Diabetic neuropathy bilat feet, norco 5/325mg #25 with a refill will last him 4 months. He only takes them seldomly and PRN for pain from his neuropathy. Discussed side effect and potential for drowsiness with norco and not to drive with it. Left hip pain, went to pain specialist, was given PT and had Xray and PO steroid.   
   
DM2: he have f/u with [facility] Dr. Jose Friends A1C 9.5% 03/2019, 08/2018 13.5%. Humulin N 25u BID Reviewed: active problem list, medication list, allergies, notes from last encounter, lab results A comprehensive review of systems was negative except for that written in the HPI. No Known Allergies Current Outpatient Medications on File Prior to Visit Medication Sig Dispense Refill  insulin NPH (HUMULIN N NPH INSULIN KWIKPEN) 100 unit/mL (3 mL) inpn 25u with breakfast and 25u with dinner 10 Pen 10  
 bumetanide (BUMEX) 2 mg tablet Take 1 Tab by mouth daily as needed. 90 Tab 1  
 naproxen (NAPROSYN) 250 mg tablet Take 1 Tab by mouth two (2) times daily (with meals). 20 Tab 0  
 omeprazole (PRILOSEC) 20 mg capsule TAKE ONE CAPSULE BY MOUTH EVERY DAY 90 Cap 1  
 gabapentin (NEURONTIN) 300 mg capsule TAKE 1 CAPSULE BY MOUTH EVERY NIGHT 90 Cap 1  
 tiotropium (SPIRIVA WITH HANDIHALER) 18 mcg inhalation capsule Take 1 Cap by inhalation daily. 30 Cap 3  citalopram (CELEXA) 20 mg tablet TAKE 1 TABLET BY MOUTH EVERY MORNING AS NEEDED FOR ANXIETY DEPPRESSION 90 Tab 1  Blood-Glucose Meter (ACCU-CHEK AGATHA PLUS METER) misc Monitor blood sugar twice daily. ICD 10 Code: E11.65 1 Each 0  
 glucose blood VI test strips (ACCU-CHEK AGATHA) strip Monitor blood sugar twice daily. ICD 10 Code: E11.65 200 Strip 3  Blood-Glucose Meter (CONTOUR METER) monitoring kit Monitor BS twice daily 1 Kit 0  
 ASCENSIA CONTOUR strip MONITOR BLOOD SUGAR TWICE DAILY 150 Strip 10  
  rosuvastatin (CRESTOR) 40 mg tablet Take 1 Tab by mouth nightly. 90 Tab 1  
 desonide (DESOWEN) 0.05 % topical ointment Apply  to affected area two (2) times a day. 30 g 0  
 MYRBETRIQ 25 mg ER tablet Take 25 mg by mouth daily.  albuterol (PROVENTIL HFA, VENTOLIN HFA) 90 mcg/actuation inhaler Take 2 puffs by inhalation every four (4) hours as needed for Wheezing. 1 Inhaler 0  
 albuterol (ACCUNEB) 1.25 mg/3 mL nebulizer solution Take 3 mL by inhalation every four (4) hours as needed for Wheezing (wheezing). 25 vial 0  
 metolazone (ZAROXOLYN) 5 mg tablet Take 5 mg by mouth daily.  ezetimibe (ZETIA) 10 mg tablet Take 10 mg by mouth daily.  fluticasone furoate (ARNUITY ELLIPTA) 200 mcg/actuation dsdv inhaler Take 1 Puff by inhalation daily. 1 Inhaler 5  
 ergocalciferol (ERGOCALCIFEROL) 50,000 unit capsule Take 1 Cap by mouth every seven (7) days. 20 Cap 1  
 aspirin 81 mg chewable tablet Take 81 mg by mouth daily. No current facility-administered medications on file prior to visit. Patient Active Problem List  
Diagnosis Code  Gynecomastia N62  
 IDDM (insulin dependent diabetes mellitus) ([facility]) E11.9, Z79.4  COPD (chronic obstructive pulmonary disease) ([facility]) J44.9  History of chronic CHF Z86.79  
 Constipation K59.00  
 CKD (chronic kidney disease) stage 3, GFR 30-59 ml/min ([facility]) N18.3  
 Uncontrolled type II diabetes mellitus ([facility] E11.65  
 Essential hypertension with goal blood pressure less than 140/90 I10  Mild intermittent asthma with acute exacerbation J45.21  
 ACP (advance care planning) Z71.89  Vitamin D deficiency E55.9  Type 2 diabetes mellitus with nephropathy ([facility]) E11.21  
 Recurrent depression ([facility] F33.9  Urinary incontinence R32  Type 2 diabetes mellitus with diabetic neuropathy ([facility]) E11.40  Chronic pain syndrome G89.4 Visit Vitals /52 Pulse 82 Temp 96.7 °F (35.9 °C) (Oral) Resp 18 Ht 5' 11\" (1.803 m) Wt 208 lb 9.6 oz (94.6 kg) SpO2 92% BMI 29.09 kg/m² General appearance: alert, cooperative, no distress, appears stated age Neurologic: Alert and oriented X 3, normal strength and tone, symmetric. Normal without focal findings. Cranial nerves 2-12 intact. Normal coordination and gait. Mental status: Alert, oriented, thought content appropriate, affect: stable, mood-congruent. Head: Normocephalic, without obvious abnormality, atraumatic Eyes: conjunctivae/corneas clear. PERRL, EOM's intact. Neck: supple, symmetrical, trachea midline, no JVD Lungs: clear to auscultation bilaterally Heart: regular rate and rhythm, S1, S2 normal, no murmur, click, rub or gallop Abdomen: soft, non-tender. Extremities: extremities normal, atraumatic, no cyanosis or edema Assessment/Plans: 
 
Diagnoses and all orders for this visit: 1. Cough -     XR CHEST PA LAT; Future -     predniSONE (DELTASONE) 10 mg tablet; 2 tabs daily for 4 days then 1 tab daily for 4 days then stop 
-     azithromycin (ZITHROMAX) 250 mg tablet; Take 2 tablets today, then take 1 tablet daily 
-     CBC WITH AUTOMATED DIFF 
-     BNP 
-     mometasone (ASMANEX TWISTHALER) 220 mcg (120 doses) aepb inhaler; Take 1 Puff by inhalation two (2) times a day. 2. Chronic obstructive pulmonary disease with acute exacerbation ([facility] -     XR CHEST PA LAT; Future -     predniSONE (DELTASONE) 10 mg tablet; 2 tabs daily for 4 days then 1 tab daily for 4 days then stop 
-     azithromycin (ZITHROMAX) 250 mg tablet; Take 2 tablets today, then take 1 tablet daily 
-     mometasone (ASMANEX TWISTHALER) 220 mcg (120 doses) aepb inhaler; Take 1 Puff by inhalation two (2) times a day. 3. Essential hypertension with goal blood pressure less than 140/90 
-     losartan (COZAAR) 50 mg tablet;  Take 1 Tab by mouth daily. 
-     METABOLIC PANEL, COMPREHENSIVE 
-     TSH 3RD GENERATION 
 
 4. 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 
-     HYDROcodone-acetaminophen (NORCO) 5-325 mg per tablet; Take 1 Tab by mouth daily as needed for Pain for up to 30 days. 5. CKD (chronic kidney disease) stage 3, GFR 30-59 ml/min (HCC) 
-     US RETROPERITONEUM COMP; Future -     METABOLIC PANEL, COMPREHENSIVE 
-     MICROALBUMIN, UR, RAND W/ MICROALB/CREAT RATIO 6. Type 2 diabetes mellitus with nephropathy ([facility] -     METABOLIC PANEL, COMPREHENSIVE 
-     TSH 3RD GENERATION 
-     MICROALBUMIN, UR, RAND W/ MICROALB/CREAT RATIO 7. Encounter for long-term current use of medication -     METABOLIC PANEL, COMPREHENSIVE 
-     CBC WITH AUTOMATED DIFF 
-     TSH 3RD GENERATION 
-     MICROALBUMIN, UR, RAND W/ MICROALB/CREAT RATIO 
-     BNP Discussed plans, risk/benefits of treatments/observations. Through the use of shared decision making, above plans were agreed upon. Medication compliance advised. Patient verbalized understanding. Follow-up and Dispositions · Return in about 2 months (around 6/8/2019) for HTN, meds, labs sooner if cough not better. Kofi Jordan MD 
4/8/2019

## 2019-04-09 NOTE — TELEPHONE ENCOUNTER
Spoke to Mr. Tori Arroyo is a 80 y.o. male  About his critical lab . This was done yesterday and he hadn't taken his morning NPH insulin of 25u BID. Says today he also missed his morning injection b/c he had to take his car to the shop. He's out currently. Report feeling fine. I asked him to go home and check it and call us if BG >450. To remember to take his NPH insulin BID. Also other contributing cause he was put on Prednisone by ED doctor for bronchitis and I had extended the course. He understands and knows what to do. Will call us back if BG > 450.      Valeria Santamaria MD  4/9/2019

## 2019-04-16 NOTE — TELEPHONE ENCOUNTER
----- Message from Samantha Bautista sent at 4/16/2019  9:23 AM EDT -----  Regarding: Dr. Jairo Perez  Pt is calling for refill on mucinex extra strength sent to [facility] on [address] [facility], on file. [phone].

## 2019-04-19 NOTE — TELEPHONE ENCOUNTER
Spoke with patient. Neurologist told him he saw something with his kidneys on US. Patient sees Dr. Carolin Escobar @ [facility] and is going to set up appt.

## 2019-04-19 NOTE — TELEPHONE ENCOUNTER
----- Message from Demond Orr sent at 4/19/2019 12:13 PM EDT -----  Regarding: /Telephone  Pt requesting a call back regarding scheduling neurologist appointment. Best [phone]

## 2019-05-14 NOTE — TELEPHONE ENCOUNTER
Marisela nurse calling from [id number] [facility] - would like a med list   Also would like a BP and A1c     Best number to reach her is [phone] ext [phone]  Fax [phone] to [address] she has gotten it twice but cannot read

## 2019-05-16 NOTE — TELEPHONE ENCOUNTER
Neurphology Specialist - Pb Liu     Would like OV note and a few labs       Best number to reach her is [phone]    Fax  [phone]

## 2019-05-21 NOTE — TELEPHONE ENCOUNTER
----- Message from Yg Brochure sent at 5/21/2019 10:36 AM EDT -----  Regarding: Dr. Christine Gonzalez with [address] [facility] stating an illegible fax was received and she would like a call back with pt's A1c, blood pressure, and if a kidney function test and pneummonia vaccine was completed. She would also like to know if a dietician is on staff and options for pt. Best contact number [phone] ext [phone].

## 2019-06-11 NOTE — TELEPHONE ENCOUNTER
Spoke with Rosanna Joy. He is having ear pain & off balance. Ankle/foot hurting. Scheduled appt for tomorrow.

## 2019-06-11 NOTE — TELEPHONE ENCOUNTER
----- Message from Cristin Kitchen sent at 6/11/2019  2:05 PM EDT -----  Regarding: Dr. Pascal/ Telephone  Contact: [phone]  Pt's daughter, Denise Castillo, is requesting a return call concerning availability soon for the pt to be seen for being off balance, having ear pain and foot pain.

## 2019-06-12 NOTE — PROGRESS NOTES
Pastora Castro is a 80 y.o. male    He's living a lone currently. has a past medical history of Abnormal prostate exam, ACP (advance care planning) (5/25/2017), Arthritis, Asbestosis(501), CAD (coronary artery disease), Calculus of kidney, Chronic pain syndrome (8/16/2018), CKD (chronic kidney disease) stage 3, GFR 30-59 ml/min ([facility]) (6/6/2016), Congestive heart failure ([facility] COPD, Depression, Essential hypertension with goal blood pressure less than 140/90 (6/6/2016), GERD (gastroesophageal reflux disease), Heart failure ([facility] Mild intermittent asthma with acute exacerbation (6/6/2016), Other ill-defined conditions(799.89), PUD (peptic ulcer disease), Uncontrolled type II diabetes mellitus ([facility] (6/6/2016), and Urinary incontinence (4/3/2018). 2 weeks ago lost his balance and fell backward and hit his head. He fell btw the sofa and the coffee table. He denies LOC. He did have headache then but not any more. No more fall since. Right foot pain. Since the fall. Can walk but limping. X-ray: Oblique fracture of the distal right fifth metatarsal.  Spoke to radiologist. We called around and best and earliest apt available tomorrow with orthopedic for cast.     Had Left ear aches yesterday and it drains sometimes at night. No longer dizzy or lightheaded. Has apt with Endo Next week. Reviewed: active problem list, medication list, allergies, notes from last encounter, lab results    A comprehensive review of systems was negative except for that written in the HPI. No Known Allergies  Current Outpatient Medications on File Prior to Visit   Medication Sig Dispense Refill    PSEUDOEPHEDRINE-guaiFENesin (MUCINEX D)  mg per tablet Take 1 Tab by mouth every twelve (12) hours. 14 Tab 0    losartan (COZAAR) 50 mg tablet Take 1 Tab by mouth daily.  30 Tab 2    insulin NPH (HUMULIN N NPH INSULIN KWIKPEN) 100 unit/mL (3 mL) inpn 25u with breakfast and 25u with dinner 10 Pen 10    bumetanide (BUMEX) 2 mg tablet Take 1 Tab by mouth daily as needed. 90 Tab 1    tiotropium (SPIRIVA WITH HANDIHALER) 18 mcg inhalation capsule Take 1 Cap by inhalation daily. 30 Cap 3    citalopram (CELEXA) 20 mg tablet TAKE 1 TABLET BY MOUTH EVERY MORNING AS NEEDED FOR ANXIETY DEPPRESSION 90 Tab 1    Blood-Glucose Meter (ACCU-CHEK AGATHA PLUS METER) misc Monitor blood sugar twice daily. ICD 10 Code: E11.65 1 Each 0    glucose blood VI test strips (ACCU-CHEK AGATHA) strip Monitor blood sugar twice daily. ICD 10 Code: E11.65 200 Strip 3    Blood-Glucose Meter (CONTOUR METER) monitoring kit Monitor BS twice daily 1 Kit 0    ASCENSIA CONTOUR strip MONITOR BLOOD SUGAR TWICE DAILY 150 Strip 10    rosuvastatin (CRESTOR) 40 mg tablet Take 1 Tab by mouth nightly. 90 Tab 1    albuterol (PROVENTIL HFA, VENTOLIN HFA) 90 mcg/actuation inhaler Take 2 puffs by inhalation every four (4) hours as needed for Wheezing. 1 Inhaler 0    albuterol (ACCUNEB) 1.25 mg/3 mL nebulizer solution Take 3 mL by inhalation every four (4) hours as needed for Wheezing (wheezing). 25 vial 0    ezetimibe (ZETIA) 10 mg tablet Take 10 mg by mouth daily.  mometasone (ASMANEX TWISTHALER) 220 mcg (120 doses) aepb inhaler Take 1 Puff by inhalation two (2) times a day. 120 Cap 1    omeprazole (PRILOSEC) 20 mg capsule TAKE ONE CAPSULE BY MOUTH EVERY DAY 90 Cap 1    gabapentin (NEURONTIN) 300 mg capsule TAKE 1 CAPSULE BY MOUTH EVERY NIGHT 90 Cap 1    fluticasone furoate (ARNUITY ELLIPTA) 200 mcg/actuation dsdv inhaler Take 1 Puff by inhalation daily. 1 Inhaler 5    ergocalciferol (ERGOCALCIFEROL) 50,000 unit capsule Take 1 Cap by mouth every seven (7) days. 20 Cap 1    aspirin 81 mg chewable tablet Take 81 mg by mouth daily.  metolazone (ZAROXOLYN) 5 mg tablet Take 5 mg by mouth daily. No current facility-administered medications on file prior to visit.       Patient Active Problem List   Diagnosis Code    Gynecomastia N62    IDDM (insulin dependent diabetes mellitus) ([facility]) E11.9, Z79.4    COPD (chronic obstructive pulmonary disease) ([facility]) J44.9    History of chronic CHF Z86.79    Constipation K59.00    CKD (chronic kidney disease) stage 3, GFR 30-59 ml/min ([facility]) N18.3    Uncontrolled type II diabetes mellitus ([facility]) E11.65    Essential hypertension with goal blood pressure less than 140/90 I10    Mild intermittent asthma with acute exacerbation J45.21    ACP (advance care planning) Z71.89    Vitamin D deficiency E55.9    Type 2 diabetes mellitus with nephropathy ([facility]) E11.21    Recurrent depression ([facility]) F33.9    Urinary incontinence R32    Type 2 diabetes mellitus with diabetic neuropathy ([facility]) E11.40    Chronic pain syndrome G89.4       Visit Vitals  /59   Pulse 90   Temp 96.5 °F (35.8 °C) (Oral)   Resp 18   Ht 5' 11\" (1.803 m)   Wt 211 lb 3.2 oz (95.8 kg)   SpO2 94%   BMI 29.46 kg/m²     General appearance: alert, cooperative, no distress, appears stated age  Neurologic: Alert and oriented X 3, normal strength and tone, symmetric. Normal without focal findings. Cranial nerves 2-12 intact. Normal coordination and gait. Mental status: Alert, oriented, thought content appropriate, affect: stable, mood-congruent. Head: Normocephalic, without obvious abnormality, atraumatic  Eyes: conjunctivae/corneas clear. PERRL, EOM's intact. Ears: bilat canals and TM Normal.   Neck: supple, symmetrical, trachea midline, no JVD  Lungs: clear to auscultation bilaterally  Heart: regular rate and rhythm  Abdomen: soft, non-tender. Extremities: bilateral feet edema pitting +1 to +2. Right foot lateral upper 3rd, 4th, 5th digit pain on palpation. Slight bruising at those digits. Skin intact. Assessment/Plans:    Diagnoses and all orders for this visit:    1. Closed nondisplaced fracture of fifth metatarsal bone of right foot, initial encounter  -     REFERRAL TO ORTHOPEDICS    2.  Right foot pain  -     XR FOOT RT MIN 3 V; Future  -     HYDROcodone-acetaminophen (NORCO) 5-325 mg per tablet; Take 1 Tab by mouth daily as needed for Pain for up to 3 days.  -     REFERRAL TO PHYSICAL THERAPY    3. Fall, initial encounter  -     XR FOOT RT MIN 3 V; Future  -     HYDROcodone-acetaminophen (NORCO) 5-325 mg per tablet; Take 1 Tab by mouth daily as needed for Pain for up to 3 days.  -     REFERRAL TO PHYSICAL THERAPY    4. Risk for falls  -     REFERRAL TO PHYSICAL THERAPY      Discussed plans, risk/benefits of treatments/observations. Through the use of shared decision making, above plans were agreed upon. Medication compliance advised. Patient verbalized understanding. Follow-up and Dispositions    · Return if symptoms worsen or fail to improve.          Leland Hoff MD  6/12/2019

## 2019-06-12 NOTE — PROGRESS NOTES
Per Dr. Noman Daly appt scheduled with Dr. Candice López @ 6-13-19 @ 1:40 PM, [address] P- [id number]. Patient's daughter notified.

## 2019-06-12 NOTE — PROGRESS NOTES
Chief Complaint   Patient presents with    Ear Pain     Left ear draining & hurting. Feeling off balance. Right foot hurting when feel backwards. 1. Have you been to the ER, urgent care clinic since your last visit? Hospitalized since your last visit? no    2. Have you seen or consulted any other health care providers outside of the [address] [place] [address] since your last visit? Include any pap smears or colon screening.  yes

## 2019-06-17 NOTE — PROGRESS NOTES
Ami Kirstin is a 80 y.o. male Chief Complaint Patient presents with  Diabetes 3 month follow up Visit Vitals /84 (BP 1 Location: Right arm, BP Patient Position: Sitting) Pulse 74 Temp 96.1 °F (35.6 °C) (Oral) Resp 14 Ht 5' 11\" (1.803 m) Wt 211 lb (95.7 kg) SpO2 95% BMI 29.43 kg/m² Health Maintenance Due Topic Date Due  
 EYE EXAM RETINAL OR DILATED  09/20/1945  
 DTaP/Tdap/Td series (1 - Tdap) 09/20/1956  Shingrix Vaccine Age 50> (1 of 2) 09/20/1985  
 FOOT EXAM Q1  05/21/2019 1. Have you been to the ER, urgent care clinic since your last visit? Hospitalized since your last visit? No 
 
2. Have you seen or consulted any other health care providers outside of the [address] [place] [address] since your last visit? Include any pap smears or colon screening.  No
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
## 2019-06-30 PROBLEM — G45.9 TIA (TRANSIENT ISCHEMIC ATTACK): Status: ACTIVE | Noted: 2019-01-01
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
## 2019-06-30 NOTE — ED NOTES
Verbal shift change report given to Jacques Minor, RN (oncoming nurse) by this RN (offgoing nurse). Report included the following information SBAR.

## 2019-06-30 NOTE — PROGRESS NOTES
Bedside and Verbal shift change report given to Salvador RN (oncoming nurse) by Brendon Kaye RN (offgoing nurse).  Report included the following information SBAR, Kardex, Recent Results and Cardiac Rhythm SR.

## 2019-06-30 NOTE — PROGRESS NOTES
-Please complete MRI History and Safety Screening Form for this patient using [employer] only under Orders Requiring a Screening Form: 
 

## 2019-06-30 NOTE — ED NOTES
Pt ambulatory with a more steady gait, but still c/o dizziness. Skin warm and dry. Respirations even and unlabored. In no apparent distress at this time. Alert and interacting appropriately. Snack provided.

## 2019-07-01 PROBLEM — R42 POST-CONCUSSION VERTIGO: Status: ACTIVE | Noted: 2019-01-01

## 2019-07-01 PROBLEM — I65.23 BILATERAL CAROTID ARTERY STENOSIS: Status: ACTIVE | Noted: 2019-01-01

## 2019-07-01 PROBLEM — R55 CONVULSIVE SYNCOPE: Status: ACTIVE | Noted: 2019-01-01

## 2019-07-01 PROBLEM — F07.81 POST-CONCUSSION VERTIGO: Status: ACTIVE | Noted: 2019-01-01

## 2019-07-01 PROBLEM — I63.112: Status: ACTIVE | Noted: 2019-01-01

## 2019-07-01 PROBLEM — G44.309 POST-CONCUSSION HEADACHE: Status: ACTIVE | Noted: 2019-01-01

## 2019-07-01 PROBLEM — F07.81 POST CONCUSSION SYNDROME: Status: ACTIVE | Noted: 2019-01-01

## 2019-07-01 PROBLEM — E11.42 DIABETIC PERIPHERAL NEUROPATHY ASSOCIATED WITH TYPE 2 DIABETES MELLITUS (HCC): Status: ACTIVE | Noted: 2019-01-01

## 2019-07-01 PROBLEM — R41.82 ALTERED MENTAL STATUS, UNSPECIFIED: Status: ACTIVE | Noted: 2019-01-01
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
## 2019-07-01 NOTE — PHYSICIAN ADVISORY
Letter of Status Determination:  
Recommend hospitalization status upgraded from OBSERVATION  to INPATIENT  Status Pt Name:  Sincere Saucedo  
MR#  
[facility] # [id number] / 
[id number] Payor: Dk Bradley / Plan: [employer] / Product Type: Managed Care Medicare /   
ALEXEY#  [id number] Room and Hospital  3101/01  @ [facility] Hospitalization date  6/30/2019 11:37 AM  
Current Attending Physician  Fran Acevedo MD  
Principal diagnosis  TIA (transient ischemic attack) [G45.9] Clinicals  80 y.o. y.o  male hospitalized with above diagnosis This pt is now diagnosed with Acute CVA. Complex care is provided. His risk of deterioration remains high. [place] (MCG) criteria Does  apply Acute CVA STATUS DETERMINATION  Based on documented presenting clinical data, comorbid conditions, high risk of adverse events and deterioration, it is our recommendation that the patient's status should be upgraded from OBSERVATION to INPATIENT status. The final decision of the patient's hospitalization status depends on the attending physician's judgment. Additional comments Payor: Dk Bradley / Plan: [employer] / Product Type: Managed Care Medicare /   
  
 
Mack Perrin MD MPH FACP Cell: [phone] Physician Advisor [address] [id number] [address] [facility]  
   
Cell  [phone]   
 
 
[id number] [employer]
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
## 2019-07-01 NOTE — PROGRESS NOTES
Reason for Admission:   Patient came to ed because he had a headache and dizziness since he fell and he also broke his right foot. Patient states he fell three weeks ago and hit his head. The headache now has become worst.   
            
RRAT Score:     52 Resources/supports as identified by patient/family:  Patient has supportive family. Top Challenges facing patient (as identified by patient/family and CM): Finances/Medication cost?    Patient denies needing assistance with medications or finances. Transportation? He drives. Support system or lack thereof? He has supportive family. Living arrangements? He lives in one story home alone. Self-care/ADLs/Cognition? Patient states prior to coming to the hospital he was independent. He does not use dme or oxygen at home. Current Advanced Directive/Advance Care Plan:  Not on file. Plan for utilizing home health:   Patient denies using home health or rehab in the past.  
  
              
Transition of Care Plan:    1) Patient plans to go home when medically stable. 2) He is open to home health or rehab if needed. NN inbasket of patient's admission. Care Management Interventions PCP Verified by CM: Yes Transition of Care Consult (CM Consult): Discharge Planning Current Support Network: Lives Alone Confirm Follow Up Transport: Family Plan discussed with Pt/Family/Caregiver: Yes Discharge Location Discharge Placement: Home
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
## 2019-07-01 NOTE — PROGRESS NOTES
Problem: Diabetes Self-Management Goal: *Disease process and treatment process Description Define diabetes and identify own type of diabetes; list 3 options for treating diabetes. Outcome: Progressing Towards Goal 
Goal: *Incorporating nutritional management into lifestyle Description Describe effect of type, amount and timing of food on blood glucose; list 3 methods for planning meals. Outcome: Progressing Towards Goal 
  
Problem: Patient Education: Go to Patient Education Activity Goal: Patient/Family Education Outcome: Progressing Towards Goal 
  
Problem: TIA/CVA Stroke: 0-24 hours Goal: Off Pathway (Use only if patient is Off Pathway) Outcome: Progressing Towards Goal 
Goal: Diagnostic Test/Procedures Outcome: Progressing Towards Goal 
Goal: Nutrition/Diet Outcome: Progressing Towards Goal 
Goal: *Neurologically stable Description Absence of additional neurological deficits Outcome: Progressing Towards Goal

## 2019-07-01 NOTE — CONSULTS
FLYNN consult received. Will schedule FLYNN for tomorrow. Will need to be discussed with patient.    
 
 
 
Amna Ortega NP 
DNP, RN, AGACNP-BC

## 2019-07-01 NOTE — INTERDISCIPLINARY ROUNDS
Bedside interdisciplinary rounds were held today to discuss patient plan of care and outcomes. The following members were present: Physician, Nurse, Clinical Care Leader, Pharmacy, Physical Therapy, and Case Management. Plan: EEG 
PT/OT to eval  
Cardiology to see
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
## 2019-07-02 PROBLEM — I63.9 CVA (CEREBRAL VASCULAR ACCIDENT) (HCC): Status: ACTIVE | Noted: 2019-01-01
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
## 2019-07-02 NOTE — PROGRESS NOTES
Spoke with Dr. Eloisa Hernandez, notified him of pt needing oxygen at this time,( upon return from FLYNN)  FLYNN may not be read tonight- provided number to echo , but asked if patient should not just stay tonight to observe and discharge in am. MD agreed.

## 2019-07-02 NOTE — DIABETES MGMT
[facility] Progress Note Recommendations/ Comments: If appropriate, please consider decreasing NPH in evening to 10 units daily 2' hypoglycemia. Blood sugar 63 mg/dL at bedtime last evening. Chart reviewed on Oswaldo Connell Sr. Nora Celis Patient is a 80 y.o. male with known diabetes on NPH 35 units BID, Glipizide 5 mg BID at home. A1c:  
Lab Results Component Value Date/Time Hemoglobin A1c 10.4 (H) 07/01/2019 02:55 AM  
 Hemoglobin A1c 13.5 (H) 08/16/2018 12:40 PM  
 
 
Recent Glucose Results:  
Lab Results Component Value Date/Time GLUCPOC 117 (H) 07/02/2019 07:44 AM  
 GLUCPOC 79 07/02/2019 07:05 AM  
 GLUCPOC 90 07/01/2019 10:36 PM  
  
 
Lab Results Component Value Date/Time Creatinine 1.36 (H) 07/01/2019 02:55 AM  
 
Estimated Creatinine Clearance: 48.6 mL/min (A) (based on SCr of 1.36 mg/dL (H)). Active Orders Diet DIET NPO Except Meds PO intake: No data found. Current hospital DM medication: NPH 15 units BID, Lispro Correctional insulin with normal sensitivity Will continue to follow as needed. Thank you. Time spent: 5 minutes Vee Edwards RD, CDE [facility] Office:  [phone]

## 2019-07-02 NOTE — PROGRESS NOTES
Per report from post FLYNN, pt coughing up some yellow /tan sputum. Per previous nurse pt stated that he had been coughing up some sputum during the day. Pt was also with slight exp wheezes bilaterally . MD was notified and pt was given steroids

## 2019-07-02 NOTE — PROGRESS NOTES
Update report called to Abimael Ortega [id number] on Neuro unit regarding Mr. Priscila Silver. NC O2 2L in place after O2 sats dipped 89-90%. Pt noted with productive cough of thick cream colored sputum. Resp even and unlabored pt s distress. Pt transported via bed with tech and nurse to room 3101.

## 2019-07-02 NOTE — PROGRESS NOTES
Patient arrived to Non-Invasive Cardiology Lab for In Patient FLYNN Procedure. Staff introduced to patient. Patient identifiers verified with Name and Date of Birth. Procedure verified with patient. Consent forms reviewed and signed by patient or authorized representative and verified. Allergies verified. Patient informed of procedure and plan of care. Questions answered with review. Patient on cardiac monitor, non-invasive blood pressure, SPO2 monitor. On 2l NC O2. Patient is A&Ox3. Patient reports no complaints. Patient on bed, in low position, with side rails up. Patient instructed to call for assistance as needed. Family not present at this time.
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
## 2019-07-02 NOTE — PROGRESS NOTES
Attempted to see pt for OT services. Pt was getting ready to leave the floor for FLYNN. Pt then is being discharged to home with home health. Will defer and continue to follow.

## 2019-07-02 NOTE — PROGRESS NOTES
CORINNE Plan--Home with home health services and 24/7 Spoke with patient today and he does not want to go to rehab. He has a neighbor next door who he states will stay with him 24/7. Jeremias Kumar He states the neighbor would stay with his wife when he was out prior to her passing. He is interested in home health and [facility] agreed [facility] his home health agency. FOC signed and placed on chart.

## 2019-07-02 NOTE — PROGRESS NOTES
TRANSFER - OUT REPORT: 
 
Verbal  Telephone report given to Maribell on [address]. being transferred to Neuro(unit) Room 3101 for routine progression of care Report consisted of patient's Situation, Background, Assessment and  
Recommendations(SBAR). Information from the following report(s) SBAR, Procedure Summary, Intake/Output, MAR, Cardiac Rhythm NSR and Procedure Verification was reviewed with the receiving nurse. Opportunity for questions and clarification was provided. Patient transported with: 
 [employer]
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
## 2019-07-02 NOTE — PROGRESS NOTES
Problem: Diabetes Self-Management Goal: *Disease process and treatment process Description Define diabetes and identify own type of diabetes; list 3 options for treating diabetes. Outcome: Resolved/Met Goal: *Incorporating nutritional management into lifestyle Description Describe effect of type, amount and timing of food on blood glucose; list 3 methods for planning meals. Outcome: Resolved/Met Goal: *Incorporating physical activity into lifestyle Description State effect of exercise on blood glucose levels. Outcome: Resolved/Met Goal: *Developing strategies to promote health/change behavior Description Define the ABC's of diabetes; identify appropriate screenings, schedule and personal plan for screenings. Outcome: Resolved/Met Goal: *Using medications safely Description State effect of diabetes medications on diabetes; name diabetes medication taking, action and side effects. Outcome: Resolved/Met Goal: *Monitoring blood glucose, interpreting and using results Description Identify recommended blood glucose targets  and personal targets. Outcome: Resolved/Met Goal: *Prevention, detection, treatment of acute complications Description List symptoms of hyper- and hypoglycemia; describe how to treat low blood sugar and actions for lowering  high blood glucose level. Outcome: Resolved/Met Goal: *Prevention, detection and treatment of chronic complications Description Define the natural course of diabetes and describe the relationship of blood glucose levels to long term complications of diabetes. Outcome: Resolved/Met Goal: *Developing strategies to address psychosocial issues Description Describe feelings about living with diabetes; identify support needed and support network Outcome: Resolved/Met Goal: *Insulin pump training Outcome: Resolved/Met Goal: *Sick day guidelines Outcome: Resolved/Met Goal: *Patient Specific Goal (EDIT GOAL, INSERT TEXT) Outcome: Resolved/Met Problem: Patient Education: Go to Patient Education Activity Goal: Patient/Family Education Outcome: Resolved/Met Problem: Patient Education: Go to Patient Education Activity Goal: Patient/Family Education Outcome: Resolved/Met Problem: TIA/CVA Stroke: 0-24 hours Goal: Off Pathway (Use only if patient is Off Pathway) Outcome: Resolved/Met Goal: Activity/Safety Outcome: Resolved/Met Goal: Consults, if ordered Outcome: Resolved/Met Goal: Diagnostic Test/Procedures Outcome: Resolved/Met Goal: Nutrition/Diet Outcome: Resolved/Met Goal: Discharge Planning Outcome: Resolved/Met Goal: Medications Outcome: Resolved/Met Goal: Respiratory Outcome: Resolved/Met Goal: Treatments/Interventions/Procedures Outcome: Resolved/Met Goal: Minimize risk of bleeding post-thrombolytic infusion Outcome: Resolved/Met Goal: Monitor for complications post-thrombolytic infusion Outcome: Resolved/Met Goal: Psychosocial 
Outcome: Resolved/Met Goal: *Hemodynamically stable Outcome: Resolved/Met Goal: *Neurologically stable Description Absence of additional neurological deficits Outcome: Resolved/Met Goal: *Verbalizes anxiety and depression are reduced or absent Outcome: Resolved/Met Goal: *Absence of Signs of Aspiration on Current Diet Outcome: Resolved/Met Goal: *Absence of deep venous thrombosis signs and symptoms(Stroke Metric) Outcome: Resolved/Met Goal: *Ability to perform ADLs and demonstrates progressive mobility and function Outcome: Resolved/Met Goal: *Stroke education started(Stroke Metric) Outcome: Resolved/Met Goal: *Dysphagia screen performed(Stroke Metric) Outcome: Resolved/Met Goal: *Rehab consulted(Stroke Metric) Outcome: Resolved/Met Problem: TIA/CVA Stroke: Day 2 Until Discharge Goal: Off Pathway (Use only if patient is Off Pathway) Outcome: Resolved/Met Goal: Activity/Safety Outcome: Resolved/Met Goal: Diagnostic Test/Procedures Outcome: Resolved/Met Goal: Nutrition/Diet Outcome: Resolved/Met Goal: Discharge Planning Outcome: Resolved/Met Goal: Medications Outcome: Resolved/Met Goal: Respiratory Outcome: Resolved/Met Goal: Treatments/Interventions/Procedures Outcome: Resolved/Met Goal: Psychosocial 
Outcome: Resolved/Met Goal: *Verbalizes anxiety and depression are reduced or absent Outcome: Resolved/Met Goal: *Absence of aspiration Outcome: Resolved/Met Goal: *Absence of deep venous thrombosis signs and symptoms(Stroke Metric) Outcome: Resolved/Met Goal: *Optimal pain control at patient's stated goal 
Outcome: Resolved/Met Goal: *Tolerating diet Outcome: Resolved/Met Goal: *Ability to perform ADLs and demonstrates progressive mobility and function Outcome: Resolved/Met Goal: *Stroke education continued(Stroke Metric) Outcome: Resolved/Met Problem: Ischemic Stroke: Discharge Outcomes Goal: *Verbalizes anxiety and depression are reduced or absent Outcome: Resolved/Met Goal: *Verbalize understanding of risk factor modification(Stroke Metric) Outcome: Resolved/Met Goal: *Hemodynamically stable Outcome: Resolved/Met Goal: *Absence of aspiration pneumonia Outcome: Resolved/Met Goal: *Aware of needed dietary changes Outcome: Resolved/Met Goal: *Verbalize understanding of prescribed medications including anti-coagulants, anti-lipid, and/or anti-platelets(Stroke Metric) Outcome: Resolved/Met Goal: *Tolerating diet Outcome: Resolved/Met Goal: *Aware of follow-up diagnostics related to anticoagulants Outcome: Resolved/Met Goal: *Ability to perform ADLs and demonstrates progressive mobility and function Outcome: Resolved/Met Goal: *Absence of DVT(Stroke Metric) Outcome: Resolved/Met Goal: *Absence of aspiration Outcome: Resolved/Met Goal: *Optimal pain control at patient's stated goal 
Outcome: Resolved/Met Goal: *Home safety concerns addressed Outcome: Resolved/Met Goal: *Describes available resources and support systems Outcome: Resolved/Met Goal: *Verbalizes understanding of activation of [id number]) for stroke symptoms(Stroke Metric) Outcome: Resolved/Met Goal: *Understands and describes signs and symptoms to report to providers(Stroke Metric) Outcome: Resolved/Met Goal: *Neurolgocially stable (absence of additional neurological deficits) Outcome: Resolved/Met Goal: *Verbalizes importance of follow-up with primary care physician(Stroke Metric) Outcome: Resolved/Met Goal: *Smoking cessation discussed,if applicable(Stroke Metric) Outcome: Resolved/Met Goal: *Depression screening completed(Stroke Metric) Outcome: Resolved/Met Problem: Falls - Risk of 
Goal: *Absence of Falls Description Document Rudolm Constable Fall Risk and appropriate interventions in the flowsheet. Outcome: Resolved/Met Problem: Patient Education: Go to Patient Education Activity Goal: Patient/Family Education Outcome: Resolved/Met Problem: Patient Education: Go to Patient Education Activity Goal: Patient/Family Education Outcome: Resolved/Met Problem: Patient Education: Go to Patient Education Activity Goal: Patient/Family Education Outcome: Resolved/Met Problem: Pressure Injury - Risk of 
Goal: *Prevention of pressure injury Description Document John Scale and appropriate interventions in the flowsheet. Outcome: Resolved/Met Problem: Patient Education: Go to Patient Education Activity Goal: Patient/Family Education Outcome: Resolved/Met

## 2019-07-02 NOTE — DISCHARGE INSTRUCTIONS
Cardiovascular Discharge Instructions    Patient Discharge    Gaurav Fung / [id number] : 1935    Admitted 2019 Discharged: 2019       When  [address]    If you have any of the following symptoms, call for emergency help immediately. The sooner you get help, the more doctors can do to prevent permanent damage. · Sudden weakness or numbness of the face, arm or leg on one side of the body   · Sudden dimness or loss of vision, particularly in one eye   · Loss of speech, trouble talking or understanding what others are saying   · Sudden severe headache with no known cause   · Unexplained dizziness, unstable walking or falling, especially along with any of the other symptoms    Diet    · You are on a low fat, low cholesterol, low sodium diet. You should continue this diet at home to help prevent future health problems. · Please contact your physician's office if you have any diet related questions. Medications    · It is important that you take the medication exactly as they are prescribed. · Keep your medication in the bottles provided by the pharmacist and keep a list of the medication names, dosages, and times to be taken in your wallet. · Do not take other medications without consulting your doctor. Personal Items and Belongings    The following personal items have been returned to you:    Valuables Screen  Clothing: At bedside, Footwear, Shirt, Shorts  Computer: None  Dental Appliances: With patient, Other (comment)(upper and lower dentures)  Home Medications: None  Jewelry: None  Luggage with Personal Belongings: None  Other Valuables: Cell Phone, Eyeglasses      Information obtained by :  I understand that if any problems occur once I am at home I am to contact my physician. I understand and acknowledge receipt of the instructions indicated above. Physician's or R.N.'s Signature                                                                  Date/Time                                                                                                                                              Patient or Representative Signature                                                          Date/Time

## 2019-07-02 NOTE — PROGRESS NOTES
Problem: Diabetes Self-Management Goal: *Disease process and treatment process Description Define diabetes and identify own type of diabetes; list 3 options for treating diabetes. Outcome: Progressing Towards Goal 
  
Problem: Patient Education: Go to Patient Education Activity Goal: Patient/Family Education Outcome: Progressing Towards Goal 
  
Problem: TIA/CVA Stroke: 0-24 hours Goal: Off Pathway (Use only if patient is Off Pathway) Outcome: Progressing Towards Goal 
Goal: Activity/Safety Outcome: Progressing Towards Goal 
  
Problem: TIA/CVA Stroke: Day 2 Until Discharge Goal: Treatments/Interventions/Procedures Outcome: Progressing Towards Goal 
Goal: Psychosocial 
Outcome: Progressing Towards Goal

## 2019-07-02 NOTE — PROGRESS NOTES
Report received from endo for primary nurse.  Pt must remain NPO , will eat dinner and then can discharge home if FLYNN is WNL per MD. DC order is in

## 2019-07-02 NOTE — PROCEDURES
[facility] 
EEG Name:  Fabian Day 
MR#:  [id number] :  1935 ACCOUNT #:  [de-identified] DATE OF SERVICE:  2019 CLINICAL INDICATIONS:  This is a patient with altered mental status, sudden fall, convulsive syncope, EEG to rule out seizure, rule out cortical abnormality, rule out encephalopathy. EEG classification on this patient is essentially normal, awake and asleep. DESCRIPTION OF THE RECORD:  The background of this recording contains a posteriorly located occipital alpha rhythm of 8-9 Hz that does attenuate some with eye opening. Hyperventilation was not performed. Photic stimulation produced a mild driving response in the posterior head regions. Throughout the recording, there were no clear areas of focal slowing. No spike or spike-and-wave discharges seen. The patient did enter brief stage of sleep with K complexes and sleep spindles seen in the central head regions. No other focal abnormality recorded, no spells of any type recorded, no spike discharges. INTERPRETATION:  This is an essentially normal electroencephalogram with the patient awake and asleep showing no clear focal abnormalities, spike discharges, or recorded spells. Zuly Rocha MD 
 
 
[id number] 
D:  2019 16:08 
T:  2019 20:35 
JOB #:  [id number] CC:   Chika Ravi MD

## 2019-07-02 NOTE — ROUTINE PROCESS
Pt blood sugar dropped to 79. PRN Dextrose 10%, a 125 ml administered. Recheck glucose post 15 min, glucose at 117. Notified AM nurse.

## 2019-07-02 NOTE — DISCHARGE SUMMARY
Hospitalist Discharge Summary Patient ID: Daniela Morse 
[id number] 
38 y.o. 
1935 6/30/2019 PCP on record: Arianna Vega MD 
 
Admit date: 6/30/2019 Discharge date and time: 7/2/2019 DISCHARGE DIAGNOSIS: 
Acute on to of Chronic CVA asa, statin neuro seen patient ataxia,EEG  
  
Multiple emboli evidenced on MRI cardiology consulted S/P FLYNN ,  
  
Headache controlled much better , likely secondary to concussion  
  
DM 2 continue insulin regimen ,  
  
Suspected concussion continue current regimen , 
  
Intractable N/V improved Hyponatremia improved  
  
Hypoglycemia resolved  
  
Chronic cough continue ,  
  
Unknown type hyperlipidemia statin  
  
Diabetic neuropathy continue current regimen ,  
 
 
CONSULTATIONS: 
IP CONSULT TO NEUROLOGY 
IP CONSULT TO CARDIOLOGY 
IP CONSULT TO CARDIOLOGY 
IP CONSULT TO CARDIOLOGY Excerpted HPI from [facility] Kauffman MD: Lukas Burciaga is a 80 y.o.  male who presents with above complaint. Pt sustained a fall 3 weeks ago. He lost his balance and fell backward. He admit hitting his head. No LOC. He fell btw the sofa and the coffee table. Since he fall started with frontal headache. Today pt reports that Headache is getting much worse. He also noted that he is more off balance today. Normally he ambulates independently. He lives alone. He vomited 4 times this am. No vomiting while in ED. He was not able to take his meds this am due to vomiting. No orthostatic changes in ED. He was feeling very dizzy and off balance when try to ambulate while in ED.  
 
 
 
______________________________________________________________________ DISCHARGE SUMMARY/HOSPITAL COURSE:  
79yo who presented to ED with fall possible concussion, had a fall few days ago headache was not improving had another fall , he was evaluated found to have small acute cva and also had episode of hypoglycemia, Hypoglycemia was corrected this could have contributed to patients fall, Also with the multiple cva neuro recommended cardiology consult which was called, patient was seen by cardiology recommended LFYNN which is done , recommendations of cardiology followed, patient is doing much better , on ASA , for hypoglycemia episode I think is from glipizide which patient was on, it was discontinued, was also seen by PT did well no skills need, Patient is being discharged home with [facility] showed small infarcts with chronic findings too, 
 
 
 
_______________________________________________________________________ Patient seen and examined by me on discharge day. Pertinent Findings: 
Gen:    Not in distress Chest: Clear lungs CVS:   Regular rhythm. No edema Abd:  Soft, not distended, not tender Neuro:  Alert,  
_______________________________________________________________________ DISCHARGE MEDICATIONS:  
Current Discharge Medication List  
  
START taking these medications Details  
aspirin 81 mg chewable tablet Take 1 Tab by mouth daily. Qty: 30 Tab, Refills: 0  
  
predniSONE (DELTASONE) 20 mg tablet Take 20 mg by mouth three (3) times daily (with meals) for 5 days. Qty: 10 Tab, Refills: 0  
  
meclizine (ANTIVERT) 25 mg tablet Take 1 Tab by mouth every six (6) hours for 10 days. Qty: 40 Tab, Refills: 0 CONTINUE these medications which have NOT CHANGED Details HYDROcodone-acetaminophen (NORCO) 5-325 mg per tablet Take 1 Tab by mouth daily as needed for Pain. insulin NPH (HUMULIN N NPH INSULIN KWIKPEN) 100 unit/mL (3 mL) inpn 25u with breakfast and 25u with dinner 
Qty: 10 Pen, Refills: 10  
 Associated Diagnoses: Uncontrolled type 2 diabetes mellitus with diabetic nephropathy, with long-term current use of insulin ([address] Type 2 diabetes mellitus with nephropathy ([address] bumetanide (BUMEX) 2 mg tablet Take 1 Tab by mouth daily as needed. Qty: 90 Tab, Refills: 1 Associated Diagnoses: Pedal edema  
  
omeprazole (PRILOSEC) 20 mg capsule TAKE ONE CAPSULE BY MOUTH EVERY DAY Qty: 90 Cap, Refills: 1 Comments: **Patient requests 90 days supply** Associated Diagnoses: Gastroesophageal reflux disease without esophagitis Blood-Glucose Meter (ACCU-CHEK AGATHA PLUS METER) misc Monitor blood sugar twice daily. ICD 10 Code: E11.65 Qty: 1 Each, Refills: 0 Blood-Glucose Meter (CONTOUR METER) monitoring kit Monitor BS twice daily 
Qty: 1 Kit, Refills: 0 Associated Diagnoses: Uncontrolled type 2 diabetes mellitus without complication, with long-term current use of insulin ([facility] ASCENSIA CONTOUR strip MONITOR BLOOD SUGAR TWICE DAILY Qty: 150 Strip, Refills: 10  
 Comments: **Patient requests 90 days supply** Associated Diagnoses: Uncontrolled type 2 diabetes mellitus without complication, with long-term current use of insulin ([facility])  
  
rosuvastatin (CRESTOR) 40 mg tablet Take 1 Tab by mouth nightly. Qty: 90 Tab, Refills: 1 Associated Diagnoses: Mixed hyperlipidemia  
  
metolazone (ZAROXOLYN) 5 mg tablet Take 5 mg by mouth daily. ezetimibe (ZETIA) 10 mg tablet Take 10 mg by mouth daily. gabapentin (NEURONTIN) 300 mg capsule TAKE 1 CAPSULE BY MOUTH EVERY NIGHT Qty: 90 Cap, Refills: 1 Associated Diagnoses: Peripheral sensory neuropathy due to type 2 diabetes mellitus ([facility] ergocalciferol (ERGOCALCIFEROL) 50,000 unit capsule Take 1 Cap by mouth every seven (7) days. Qty: 20 Cap, Refills: 1 Associated Diagnoses: Vitamin D deficiency  
  
citalopram (CELEXA) 20 mg tablet TAKE 1 TABLET BY MOUTH EVERY MORNING AS NEEDED FOR ANXIETY DEPPRESSION Qty: 90 Tab, Refills: 1 STOP taking these medications  
  
 glipiZIDE (GLUCOTROL) 5 mg tablet Comments:  
Reason for Stopping:   
   
 losartan (COZAAR) 50 mg tablet Comments:  
Reason for Stopping:   
   
  
 
 
 
Patient Follow Up Instructions: Activity: Activity as tolerated Diet: Diabeti c Diet 
 
 
 
 
________________________________________________________________ Risk of deterioration: low Condition at Discharge:  Stable 
__________________________________________________________________ Disposition Home with [facility] 
 
____________________________________________________________________ Code Status: full 
___________________________________________________________________ Total time in minutes spent coordinating this discharge (includes going over instructions, follow-up, prescriptions, and preparing report for sign off to her PCP) :  35  minutes Signed: 
Lety Lance MD

## 2019-07-02 NOTE — INTERDISCIPLINARY ROUNDS
Bedside interdisciplinary rounds were held today to discuss patient plan of care and outcomes. The following members were present: Physician, Nurse, Clinical Care Leader, Pharmacy, Physical Therapy, and Case Management. Plan: 
 
Pt having FLYNN today , per MD , he can DC after that CM to set up [facility]
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
## 2019-07-03 NOTE — PROGRESS NOTES
Note for work Patient may return to work but cannot drive till cleared by neurology Dr Rachel Garcia
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
## 2019-07-03 NOTE — DISCHARGE SUMMARY
Hospitalist Discharge Summary Patient ID: Claire Talavera 
[id number] 
73 y.o. 
1935 6/30/2019 PCP on record: Breanna Ludwig MD 
 
Admit date: 6/30/2019 Discharge date and time: 7/3/2019 DISCHARGE DIAGNOSIS: 
Acute CVA indicated on imagine clinically doing well seen by cardiology, neurology, DM with hypoglycemia episode discontinued glipizide for hypoglycemia Intractable N/V resolved JULIO secondary to dehydration improved CKD stage 3 stable Hyponatremia resolved Hyperkalemia resolved Chronic cough likely secondary to possible bronchitis on steroids CONSULTATIONS: 
IP CONSULT TO NEUROLOGY 
IP CONSULT TO CARDIOLOGY 
IP CONSULT TO CARDIOLOGY 
IP CONSULT TO CARDIOLOGY Excerpted HPI from H&P of Lauryn Bonds MD: Francisca Naranjo is a 80 y.o.  male who presents with above complaint. Pt sustained a fall 3 weeks ago. He lost his balance and fell backward. He admit hitting his head. No LOC. He fell btw the sofa and the coffee table. Since he fall started with frontal headache. Today pt reports that Headache is getting much worse. He also noted that he is more off balance today. Normally he ambulates independently. He lives alone. He vomited 4 times this am. No vomiting while in ED. He was not able to take his meds this am due to vomiting. No orthostatic changes in ED. He was feeling very dizzy and off balance when try to ambulate while in ED.  
 
 
______________________________________________________________________ DISCHARGE SUMMARY/HOSPITAL COURSE:  
 
80 you who was admitted through the ED with acute CVA, seen by neuro multiple work up negative ,also had FLYNN unremarkable , after FLYNN had episode of hypoxia, respiratory distress , FLYNN unremarkable, no fever , no chills, seen today no change going home short term prednison, This is addendum to yesterday , please review dc summary from yesterday , 
 
 
 
 
 _______________________________________________________________________ Patient seen and examined by me on discharge day. Pertinent Findings: 
Gen:    Not in distress Chest: Clear lungs CVS:   Regular rhythm. No edema Abd:  Soft, not distended, not tender 
 
_______________________________________________________________________ DISCHARGE MEDICATIONS:  
Discharge Medication List as of 7/3/2019  9:10 AM  
  
START taking these medications Details  
aspirin 81 mg chewable tablet Take 1 Tab by mouth daily. , Normal, Disp-30 Tab, R-0  
  
predniSONE (DELTASONE) 20 mg tablet Take 20 mg by mouth three (3) times daily (with meals) for 5 days. , Normal, Disp-10 Tab, R-0  
  
meclizine (ANTIVERT) 25 mg tablet Take 1 Tab by mouth every six (6) hours for 10 days. , Normal, Disp-40 Tab, R-0  
  
  
CONTINUE these medications which have NOT CHANGED Details HYDROcodone-acetaminophen (NORCO) 5-325 mg per tablet Take 1 Tab by mouth daily as needed for Pain., Historical Med  
  
insulin NPH (HUMULIN N NPH INSULIN KWIKPEN) 100 unit/mL (3 mL) inpn 25u with breakfast and 25u with dinner, No Print, Disp-10 Pen, R-10  
  
bumetanide (BUMEX) 2 mg tablet Take 1 Tab by mouth daily as needed., Normal, Disp-90 Tab, R-1  
  
omeprazole (PRILOSEC) 20 mg capsule TAKE ONE CAPSULE BY MOUTH EVERY DAY, Normal**Patient requests 90 days supply**Disp-90 Cap, R-1  
  
gabapentin (NEURONTIN) 300 mg capsule TAKE 1 CAPSULE BY MOUTH EVERY NIGHT, Normal, Disp-90 Cap, R-1  
  
ergocalciferol (ERGOCALCIFEROL) 50,000 unit capsule Take 1 Cap by mouth every seven (7) days. , Normal, Disp-20 Cap, R-1  
  
citalopram (CELEXA) 20 mg tablet TAKE 1 TABLET BY MOUTH EVERY MORNING AS NEEDED FOR ANXIETY DEPPRESSION, Normal, Disp-90 Tab, R-1 Blood-Glucose Meter (ACCU-CHEK AGATHA PLUS METER) misc Monitor blood sugar twice daily. ICD 10 Code: E11.65, Normal, Disp-1 Each, R-0 Blood-Glucose Meter (CONTOUR METER) monitoring kit Monitor BS twice daily, Normal, Disp-1 Kit, R-0  
  
ASCENSIA CONTOUR strip MONITOR BLOOD SUGAR TWICE DAILY, Normal**Patient requests 90 days supply**Disp-150 Strip, R-10  
  
rosuvastatin (CRESTOR) 40 mg tablet Take 1 Tab by mouth nightly., Normal, Disp-90 Tab, R-1  
  
metolazone (ZAROXOLYN) 5 mg tablet Take 5 mg by mouth daily. , Historical Med  
  
ezetimibe (ZETIA) 10 mg tablet Take 10 mg by mouth daily. , Historical Med  
  
  
STOP taking these medications  
  
 glipiZIDE (GLUCOTROL) 5 mg tablet Comments:  
Reason for Stopping:   
   
 losartan (COZAAR) 50 mg tablet Comments:  
Reason for Stopping:   
   
  
 
 
 
Patient Follow Up Instructions: Activity: Activity as tolerated Diet: Diabetic Diet Brigid Stratton Follow-up Information Follow up With Specialties Details Why Contact Info Kylee Cuevas MD Family Practice Go on 7/5/2019 Please follow up on July 5, 2019 at 8:40 arriving 15 minutes early to register with photo ID, insurance card and current list of medication  [address] [facility] [address] [facility] 
[phone] Pura Muñoz MD Neurology Go on 8/15/2019 Please follow up on August 15, 2019 at 9:40 arriving 15 minutes early to register with photo ID, insurance card and current list of medication  [address] [facility] [address] [facility] 
[phone] [address] [facility] nursing will call you and schedule a visit when you are discharged . [phone]  
  
 
________________________________________________________________ Risk of deterioration: low Condition at Discharge:  Stable 
__________________________________________________________________ Disposition Home with [facility] 
 
____________________________________________________________________ Code Status: DNR 
___________________________________________________________________ Total time in minutes spent coordinating this discharge (includes going over instructions, follow-up, prescriptions, and preparing report for sign off to her PCP) :  34  minutes Signed: 
Mason Monae MD

## 2019-07-03 NOTE — PROGRESS NOTES
Discharge instructions reviewed with patient. He verbalizes understanding of all information provided. Pt aware of driving restrictions , needs to be cleared by neurology. Follow up appointments in place. RX sent electronically. Ride coming to pick patient up to take home.
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
## 2019-07-05 NOTE — PROGRESS NOTES
Teri Franklin is a 80 y.o. male 
 
 has a past medical history of Abnormal prostate exam, ACP (advance care planning) (5/25/2017), Arthritis, Asbestosis(501), CAD (coronary artery disease), Calculus of kidney, Chronic pain syndrome (8/16/2018), CKD (chronic kidney disease) stage 3, GFR 30-59 ml/min (HCC) (6/6/2016), Congestive heart failure ([facility] [address] COPD, Depression, Essential hypertension with goal blood pressure less than 140/90 (6/6/2016), GERD (gastroesophageal reflux disease), Heart failure ([facility] [address] Mild intermittent asthma with acute exacerbation (6/6/2016), Other ill-defined conditions(799.89), PUD (peptic ulcer disease), Uncontrolled type II diabetes mellitus ([facility] (6/6/2016), and Urinary incontinence (4/3/2018). His children are out of state. His wife passed last year. He lives alone. He continues to work as a  usually from 3:30 in the afternoon to 11 at night. But because of staff shortages, he sometimes works from 3:30 in the afternoon to 730 the next morning. The job is entirely sedentary. Since our last visit he have seen orthopedic for a closed nondisplaced fracture of foot. Note reviewed, to f/u there in next week for repeat X-ray foot. Saw Endo for diabetes, notes reviewed. Uncontrolled, large contributing factor his diet. Hospital admission f/u. Admit date: 6/30/2019 Discharge date and time: 7/3/2019 
  
DISCHARGE DIAGNOSIS:  
Acute CVA indicated on imagine clinically doing well seen by cardiology, neurology,  
 FLYNN unremarkable Aspirin was added to his med regiment. DM with hypoglycemia episode  
 discontinued glipizide for hypoglycemia Intractable N/V resolved JULIO secondary to dehydration improved Losartan 50mg was d/isaak. CKD stage 3 stable Hyponatremia resolved Hyperkalemia resolved Chronic cough likely secondary to possible bronchitis on steroids He have apt with neuro 08/15/2019. He likes work and wanted to go back to work. PT, OT, and homehealth came by, but b/c he's going back to work they're unable to justify their stay. He's currently using a cane. We discussed PT. He doesn't want PT. He doesn't want [facility]. He report had a right arm weakness at first but it's all back to normal.  
Dizziness have been improving. No fall. He doesn't want PT.   
 
 
HTN: BP is creeping back up. Also for renal protection with DM2. We'll put back losartan at 25mg. HLD: crestor 40mg and zetia 10mg.  
   
CKD with nephropathy. Will get US renal. Refer to Nephro. I suspect due to his prolonged uncontrolled DM2. Chronic pain and Diabetic neuropathy bilat feet, norco 5/325mg #25 with a refill will last him 4 months. He only takes them seldomly and PRN for pain from his neuropathy. Discussed side effect and potential for drowsiness with norco and not to drive with it. DM2: he have f/u with [facility] Dr. Romero Severino A1C 10.4% 07/2019, 9.5% 03/2019, 08/2018 13.5%. Humulin N 25u BID Reviewed: active problem list, medication list, allergies, notes from last encounter, lab results A comprehensive review of systems was negative except for that written in the HPI. No Known Allergies Current Outpatient Medications on File Prior to Visit Medication Sig Dispense Refill  albuterol (ACCUNEB) 1.25 mg/3 mL nebu Take 1.25 mg by inhalation.  ASMANEX TWISTHALER 220 mcg (120 doses) aepb inhaler TAKE 1 PUFF BY INHALATION TWO (2) TIMES A DAY  1  
 guaiFENesin ER (MUCINEX) 600 mg ER tablet Take 1 Tab by mouth two (2) times a day for 10 days. 20 Tab 0  
 aspirin 81 mg chewable tablet Take 1 Tab by mouth daily. 30 Tab 0  predniSONE (DELTASONE) 20 mg tablet Take 20 mg by mouth three (3) times daily (with meals) for 5 days. 10 Tab 0  
 meclizine (ANTIVERT) 25 mg tablet Take 1 Tab by mouth every six (6) hours for 10 days.  40 Tab 0  
  HYDROcodone-acetaminophen (NORCO) 5-325 mg per tablet Take 1 Tab by mouth daily as needed for Pain.  insulin NPH (HUMULIN N NPH INSULIN KWIKPEN) 100 unit/mL (3 mL) inpn 25u with breakfast and 25u with dinner (Patient taking differently: 35u with breakfast and 35u with dinner) 10 Pen 10  
 bumetanide (BUMEX) 2 mg tablet Take 1 Tab by mouth daily as needed. 90 Tab 1  
 omeprazole (PRILOSEC) 20 mg capsule TAKE ONE CAPSULE BY MOUTH EVERY DAY 90 Cap 1  
 gabapentin (NEURONTIN) 300 mg capsule TAKE 1 CAPSULE BY MOUTH EVERY NIGHT 90 Cap 1  Blood-Glucose Meter (ACCU-CHEK AGATHA PLUS METER) misc Monitor blood sugar twice daily. ICD 10 Code: E11.65 1 Each 0  Blood-Glucose Meter (CONTOUR METER) monitoring kit Monitor BS twice daily 1 Kit 0  
 ASCENSIA CONTOUR strip MONITOR BLOOD SUGAR TWICE DAILY 150 Strip 10  
 rosuvastatin (CRESTOR) 40 mg tablet Take 1 Tab by mouth nightly. 90 Tab 1  
 metolazone (ZAROXOLYN) 5 mg tablet Take 5 mg by mouth daily.  ezetimibe (ZETIA) 10 mg tablet Take 10 mg by mouth daily.  ergocalciferol (ERGOCALCIFEROL) 50,000 unit capsule Take 1 Cap by mouth every seven (7) days. 20 Cap 1  citalopram (CELEXA) 20 mg tablet TAKE 1 TABLET BY MOUTH EVERY MORNING AS NEEDED FOR ANXIETY DEPPRESSION 90 Tab 1 No current facility-administered medications on file prior to visit. Patient Active Problem List  
Diagnosis Code  Gynecomastia N62  
 IDDM (insulin dependent diabetes mellitus) ([facility]) E11.9, Z79.4  COPD (chronic obstructive pulmonary disease) ([facility]) J44.9  History of chronic CHF Z86.79  
 Constipation K59.00  
 CKD (chronic kidney disease) stage 3, GFR 30-59 ml/min ([facility]) N18.3  
 Uncontrolled type II diabetes mellitus ([facility] [address] E11.65  
 Essential hypertension with goal blood pressure less than 140/90 I10  Mild intermittent asthma with acute exacerbation J45.21  
 ACP (advance care planning) Z71.89  Vitamin D deficiency E55.9  Type 2 diabetes mellitus with nephropathy ([facility]) E11.21  
 Recurrent depression ([facility] [address] F33.9  Urinary incontinence R32  Type 2 diabetes mellitus with diabetic neuropathy ([facility]) E11.40  Chronic pain syndrome G89.4  TIA (transient ischemic attack) G45.9  Embolic stroke involving left vertebral artery ([facility] [address] L76.401  Bilateral carotid artery stenosis I65.23  
 Diabetic peripheral neuropathy associated with type 2 diabetes mellitus ([facility]) E11.42  
 Post concussion syndrome F07.81  
 Post-concussion headache G44.309  Post-concussion vertigo F07.81, R42  Altered mental status, unspecified R41.82  
 Convulsive syncope R55  CVA [facility]cerebral vascular accident) [facility] I63.9 Visit Vitals /64 Pulse 88 Temp 96.3 °F (35.7 °C) (Oral) Resp 18 Ht 5' 11\" (1.803 m) Wt 205 lb 6.4 oz (93.2 kg) SpO2 95% BMI 28.65 kg/m² General appearance: alert, cooperative, no distress, appears stated age Neurologic: Alert and oriented X 3, normal strength and tone, symmetric. Normal without focal findings. Cranial nerves 2-12 intact. Normal coordination and gait. Mental status: Alert, oriented, thought content appropriate, affect: stable, mood-congruent. Head: Normocephalic, without obvious abnormality, atraumatic Eyes: conjunctivae/corneas clear. PERRL, EOM's intact. Neck: supple, symmetrical, trachea midline, no JVD Lungs: clear to auscultation bilaterally Heart: regular rate and rhythm, S1, S2 normal, no murmur, click, rub or gallop Abdomen: soft, non-tender. Extremities: extremities normal, atraumatic, no cyanosis or edema Assessment/Plans: We discussed if he needs help at home with nursing, PT, or walker, scooters for long distance to let us know. Diagnoses and all orders for this visit: 1. Hospital discharge follow-up 2. History of CVA (cerebrovascular accident) 3. CKD (chronic kidney disease) stage 3, GFR 30-59 ml/min ([facility]) 4. Uncontrolled type 2 diabetes mellitus with hyperglycemia ([facility] [address] 5. CVA, old, ataxia 6. Essential hypertension 
-     losartan (COZAAR) 25 mg tablet; Take 1 Tab by mouth daily. Discussed plans, risk/benefits of treatments/observations. Through the use of shared decision making, above plans were agreed upon. Medication compliance advised. Patient verbalized understanding. Follow-up and Dispositions · Return in about 6 weeks (around 8/16/2019) for routine check up. Jessie Lee MD 
7/5/2019

## 2019-07-08 NOTE — TELEPHONE ENCOUNTER
----- Message from Milad Chopra sent at 7/8/2019 10:15 AM EDT -----  Regarding: /Telephone  General Message/Vendor Calls    Caller's first and last name:Sasha Mahoney      Reason for call:sooner appt    Callback required yes/no and why: yes       Best contact number(s) [phone]      Details to clarify the request: Pt's daughter requested a sooner appt.       Milad Chopra

## 2019-07-09 NOTE — TELEPHONE ENCOUNTER
Patient was seen 6/30/19  I called and notified that there is nothing sooner at this time. They will keep the 8/15/19 appointment they have currently

## 2019-07-18 PROBLEM — R42 DIZZINESS: Status: ACTIVE | Noted: 2019-01-01

## 2019-07-18 NOTE — ED TRIAGE NOTES
Pt arrives to the ed via ems for c/o generalized weakness since 1630 this afternoon. Per pt he was at the store earlier today when he started to become unsteady on his feet. Pt states \"I was in here about two weeks ago and had a TIA that affected my right side\". Pt states he received tpa which resolved his right sided weakness. Pt is a&O x4 on arrival to ed.
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
## 2019-07-19 NOTE — PROGRESS NOTES
CORINNE Plan--Home with home health services. Patient will have home health services thru [employer] when discharged for pt,ot and nursing. He is in agreement. FOC signed and placed on chart. He states when discharged his neighbor will transport him home.

## 2019-07-19 NOTE — ED NOTES
TRANSFER - OUT REPORT:    Verbal report given to raffi rn(name) on Deidre Gannon.  being transferred to nstu (unit) for routine progression of care       Report consisted of patients Situation, Background, Assessment and   Recommendations(SBAR). Information from the following report(s) SBAR, ED Summary and MAR was reviewed with the receiving nurse. Lines:       Opportunity for questions and clarification was provided.       Patient transported with:   [employer]
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
## 2019-07-19 NOTE — PROGRESS NOTES
Initial Nutrition Assessment:    INTERVENTIONS/RECOMMENDATIONS:   · Meals/Snacks: General/healthful diet:  Continue CCD for BG management. · Supplements: Commercial supplement:  RD will order Glucerna Shake po once daily to try. Will order with dinner so pt can set aside and have as an evening snack. ASSESSMENT:   7/19:  Chart reviewed; med noted for ataxia on admission. Pt also has DM (A1C 10.5%), currently elevated BG levels 246-377. Visited with pt at bedside, who confirmed weight loss as identified on MST to be significant (~25 lbs) x 4 months. Pt attributes weight loss to recent passing of his wife back in Nov 2018. Pt does not usually prepare many meals at home and will often eat at home, skip breakfast or prepared a bologna sandwich for lunch at home. Encouraged regular consumption of meals/Consistent carb intake and may supplement with glucerna shakes if does not feel like taking solid foods or poor appetite. Diet Order: Consistent carb  % Eaten:  No data found. Pertinent Medications: [x]Reviewed []Other: lipitor, plavix, lovenox, neurontin  Pertinent Labs: [x]Reviewed []Other: -377  Food Allergies: [x]None []Other   Last BM:    [x]Active     []Hyperactive  []Hypoactive       [] Absent BS  Skin:    [x] Intact   [] Incision  [] Breakdown  [] Other:    Anthropometrics:   Height: 5' 11\" (180.3 cm) Weight: 91.2 kg (201 lb)   IBW (%IBW):   ( ) UBW (%UBW):   (  %)   Last Weight Metrics:  Weight Loss Metrics 7/19/2019 7/5/2019 7/2/2019 6/17/2019 6/12/2019 4/8/2019 3/7/2019   Today's Wt 201 lb 205 lb 6.4 oz 211 lb 211 lb 211 lb 3.2 oz 208 lb 9.6 oz 216 lb 9.6 oz   BMI 28.03 kg/m2 28.65 kg/m2 29.43 kg/m2 29.43 kg/m2 29.46 kg/m2 29.09 kg/m2 30.21 kg/m2       BMI: Body mass index is 28.03 kg/m². This BMI is indicative of:   []Underweight    []Normal    [x]Overweight    [] Obesity   [] Extreme Obesity (BMI>40)     Estimated Nutrition Needs (Based on):   2119 Kcals/day(BMR (1630) x 1. 3AF) , 92 g(1.0 g/kg bw) Protein  Carbohydrate: At Least 130 g/day  Fluids: 2100 mL/day (1ml/kcal)    NUTRITION DIAGNOSES:   Problem:  Altered nutrition-related lab values      Etiology: related to uncontrolled DM     Signs/Symptoms: as evidenced by A1C 10.5%,       NUTRITION INTERVENTIONS:  Meals/Snacks: General/healthful diet   Supplements: Commercial supplement              GOAL:   PO intake remain >50% of meals while trend BG <180 mg/dl next 5-7 days    LEARNING NEEDS (Diet, Food/Nutrient-Drug Interaction):    [x] None Identified   [] Identified and Education Provided/Documented   [] Identified and Pt declined/was not appropriate     Cultureal, Jainism, OR Ethnic Dietary Needs:    [x] None Identified   [] Identified and Addressed     [x] Interdisciplinary Care Plan Reviewed/Documented    [x] Discharge Planning:   Continue CCD for BG management    MONITORING /EVALUATION:   Food/Nutrient Intake Outcomes:  Total energy intake  Physical Signs/Symptoms Outcomes: Weight/weight change, Glucose profile    NUTRITION RISK:    [x] Patient At Nutritional Risk    [] Patient Not At Nutritional Risk    PT SEEN FOR:    []  MD Consult: []Calorie Count      []Diabetic Diet Education        []Diet Education     []Electrolyte Management     []General Nutrition Management and Supplements     []Management of Tube Feeding     []TPN Recommendations    [x]  RN Referral:  [x]MST score >=2     []Enteral/Parenteral Nutrition PTA     []Pregnant: Gestational DM or Multigestation     []Pressure Ulcer/Wound Care needs        []  Low BMI  []  LOS      Sulma Shoulder, [address] [place] [address]  Pager [phone]  Weekend Pager [phone]

## 2019-07-19 NOTE — PROGRESS NOTES
Reason for Readmission:    Patient came to ed for ataxia . Patient was here 6/30/19 to 7/3/19 for headache and being off balance. He fell at home on that admission prior to coming in on 6/30/19. RRAT Score and Risk Level:  61        Level of Readmission:    2      Care Conference scheduled:   IDR       Resources/supports as identified by patient/family:  Patient has supportive friends, and  neighbors. Top Challenges facing patient (as identified by patient/family and CM): Finances/Medication cost?    Patient denies problems with finances or getting his medications. Transportation   His neighbor transports him . Support system or lack thereof? He has supportive next door neighbor and friends. Living arrangements? He lives in one story home alone. Self-care/ADLs/Cognition? He was able to bath , dress and feed himself prior to coming to the hospital.          Current Advanced Directive/Advance Care Plan: Not on file. Plan for utilizing home health:   He has [facility] in the past and is willing to use again if needed. Transition of Care Plan:    Based on readmission, the patient's previous Plan of Care   has been evaluated and/or modified. The current Transition of Care Plan is:    Patient plans to go back home when medically stable. He is open to using home health if needed. He will follow up with his medical care team when discharged. [facility]         Care Management Interventions  PCP Verified by CM:  Yes  Transition of Care Consult (CM Consult): Discharge Planning  Discharge Durable Medical Equipment: (He has bp cuff, wheelchair, cane,grab bars and shower chair at home. )  Physical Therapy Consult: Yes  Occupational Therapy Consult: Yes  Speech Therapy Consult: Yes  Current Support Network: Lives Alone  Confirm Follow Up Transport: Friends  Plan discussed with Pt/Family/Caregiver: Yes  Discharge Location  Discharge Placement: Home
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
## 2019-07-19 NOTE — PROGRESS NOTES
Deferred visit: Referral received and acknowledged. The patient is currently off the floor for testing. Will follow up for an eval later in the day.

## 2019-07-19 NOTE — ROUTINE PROCESS
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
## 2019-07-19 NOTE — PROGRESS NOTES
Hospitalist Progress Note    NAME: Ruddy Baker Sr.   :  1935   MRN:  [id number]       Assessment / Plan:  Acute Cva of rt cerebellum and b/l occipital lobes  Hx of recent ? Embolic CVA on  and FLYNN negative  Has had recent bilateral posterior occipital CVA and underwent thorough workup including FLYNN  -MRI brain shows new cva as above  -Continue ASA and added Plavix per neurology. No need for [facility] per Dr Toshia Bullard  -Might benefit from out pt loop recorder placement, will consult cardiology  -cont statin  -consult pt/ot  -discharge based on PT recommendations     HTN  Cont losartan     IDDM (insulin dependent diabetes mellitus)   Increase NPH to  30 units BID   SSI  HbA1C is 10.5      COPD (chronic obstructive pulmonary disease)  Continue Px inhalers and PRN nebs    Chronic Diastolic Heart Failure  Takes PRN diuretics at home     CKD (chronic kidney disease) stage 3, GFR 30-59 ml/min   Cr is close to base line    Dispo: After cardiology evaluation and PT eval. Today or tomorrow. Code Status: DNR/DNI as per his own wishes  Surrogate Decision Maker: Children     DVT Prophylaxis: Lovenox     Baseline: functional                       Body mass index is 28.03 kg/m². Subjective:     Chief Complaint / Reason for Physician Visit  Dizziness is better. No new weakness or numbness. Review of Systems:  Symptom Y/N Comments  Symptom Y/N Comments   Fever/Chills    Chest Pain     Poor Appetite    Edema     Cough    Abdominal Pain     Sputum    Joint Pain     SOB/THAKKAR    Pruritis/Rash     Nausea/vomit    Tolerating PT/OT     Diarrhea    Tolerating Diet     Constipation    Other       Could NOT obtain due to:      Objective:     VITALS:   Last 24hrs VS reviewed since prior progress note.  Most recent are:  Patient Vitals for the past 24 hrs:   Temp Pulse Resp BP SpO2   19 1132 98.1 °F (36.7 °C) 77 18 159/59 93 %   19 0732 98 °F (36.7 °C) 80 18 149/55 95 %   19 0350 97.9 °F (36.6 °C) 83 20 142/72 93 %   07/19/19 0141 98.1 °F (36.7 °C) 83 20 184/90 95 %   07/19/19 0115  84 21 145/62 91 %   07/18/19 2300  84 22 190/76 94 %   07/18/19 2249 97.7 °F (36.5 °C) 89      07/18/19 2249    175/48    07/18/19 1953 98.3 °F (36.8 °C) 98 18 169/65 92 %     No intake or output data in the 24 hours ending 07/19/19 1328     PHYSICAL EXAM:  General: cooperative, no acute distress    EENT:  EOMI. Anicteric sclerae. MMM  Resp:  CTA bilaterally, no wheezing or rales. No accessory muscle use  CV:  Regular  rhythm,  No edema  GI:  Soft, Non distended, Non tender.  +Bowel sounds  Neurologic:  Alert and oriented X 3, normal speech,   Psych:   Good insight. Not anxious nor agitated  Skin:  No rashes.   No jaundice    Reviewed most current lab test results and cultures  YES  Reviewed most current radiology test results   YES  Review and summation of old records today    NO  Reviewed patient's current orders and MAR    YES  PMH/SH reviewed - no change compared to H&P          Current Facility-Administered Medications:     insulin lispro (HUMALOG) injection, , SubCUTAneous, AC&HS, Clark Dubois MD, 5 Units at 07/19/19 1212    insulin NPH (NOVOLIN N, HUMULIN N) injection 30 Units, 30 Units, SubCUTAneous, BID, Mindy Eugene MD, 30 Units at 07/19/19 0929    acetaminophen (TYLENOL) tablet 650 mg, 650 mg, Oral, Q6H PRN, Angeles Rodriguez MD    ondansetron (ZOFRAN) injection 4 mg, 4 mg, IntraVENous, Q4H PRN, Angeles Rodriguez MD    sodium chloride (NS) flush 5-40 mL, 5-40 mL, IntraVENous, Q8H, Clark Dubois MD, 10 mL at 07/18/19 2325    sodium chloride (NS) flush 5-40 mL, 5-40 mL, IntraVENous, PRN, Clark Dubois MD, 10 mL at 07/19/19 0249    acetaminophen (TYLENOL) tablet 650 mg, 650 mg, Oral, Q4H PRN **OR** acetaminophen (TYLENOL) solution 650 mg, 650 mg, Per NG tube, Q4H PRN **OR** acetaminophen (TYLENOL) suppository 650 mg, 650 mg, Rectal, Q4H PRN, Clark Pinedo MD    clopidogrel (PLAVIX) tablet 75 mg, 75 mg, Oral, DAILY, Clark Dubois MD, 75 mg at 07/19/19 0928    enoxaparin (LOVENOX) injection 40 mg, 40 mg, SubCUTAneous, Q24H, Clark Dubois MD, 40 mg at 07/19/19 0929    glucose chewable tablet 16 g, 4 Tab, Oral, PRN, Clark Dubois MD    dextrose (D50) infusion 12.5-25 g, 12.5-25 g, IntraVENous, PRN, Clark Dubois MD    glucagon (GLUCAGEN) injection 1 mg, 1 mg, IntraMUSCular, PRN, Clark Dubois MD    albuterol (PROVENTIL VENTOLIN) nebulizer solution 1.25 mg, 1.25 mg, Inhalation, Q4H PRN, Clark Dubois MD    fluticasone furoate (ARNUITY ELLIPTA) 100 mcg/puff, 1 Puff, Inhalation, DAILY, Clark Dubois MD, 1 Puff at 07/19/19 0928    aspirin chewable tablet 81 mg, 81 mg, Oral, DAILY, Clark Dubois MD, 81 mg at 07/19/19 5579    citalopram (CELEXA) tablet 20 mg, 20 mg, Oral, DAILY, Clark Dubois MD, 20 mg at 07/19/19 5837    ezetimibe (ZETIA) tablet 10 mg, 10 mg, Oral, DAILY, Clark Dubois MD, 10 mg at 07/19/19 3978    gabapentin (NEURONTIN) capsule 300 mg, 300 mg, Oral, QHS, Clark Dubois MD, 300 mg at 07/19/19 0045    HYDROcodone-acetaminophen (NORCO) 5-325 mg per tablet 1 Tab, 1 Tab, Oral, Q6H PRN, Clark Dubois MD    losartan (COZAAR) tablet 25 mg, 25 mg, Oral, DAILY, Clark Dubois MD, 25 mg at 07/19/19 0928    pantoprazole (PROTONIX) tablet 40 mg, 40 mg, Oral, ACB, Clark Dubois MD, 40 mg at 07/19/19 9984    atorvastatin (LIPITOR) tablet 80 mg, 80 mg, Oral, QHS, Clark Dubois MD, 80 mg at 07/19/19 0239  ________________________________________________________________________  Care Plan discussed with:    Comments   Patient y    Family      RN y    Care Manager     Consultant                        Multidiciplinary team rounds were held today with , nursing, pharmacist and clinical coordinator. Patient's plan of care was discussed; medications were reviewed and discharge planning was addressed. ________________________________________________________________________  Total NON critical care TIME:  35    Minutes    Total CRITICAL CARE TIME Spent:   Minutes non procedure based      Comments   >50% of visit spent in counseling and coordination of care     ________________________________________________________________________  Kellen Kelley MD     Procedures: see electronic medical records for all procedures/Xrays and details which were not copied into this note but were reviewed prior to creation of Plan. LABS:  I reviewed today's most current labs and imaging studies.   Pertinent labs include:  Recent Labs     07/18/19 2025   WBC 9.6   HGB 14.1   HCT 42.0        Recent Labs     07/19/19  0251 07/18/19 2025    134*   K 4.3 4.8    100   CO2 27 29   * 368*   BUN 26* 26*   CREA 1.39* 1.42*   CA 9.3 9.6   MG  --  1.8   ALB 2.9* 3.3*   TBILI 0.1* 0.3   SGOT 12* 10*   ALT 19 21       Signed: Kellen Kelley MD

## 2019-07-19 NOTE — PROGRESS NOTES
Speech Pathology  Orders received, chart reviewed, spoke with patient and RN. MRI revealed new tiny areas of acute ischemia in R cerebellum and bilateral occipital lobes. Patient reports no changes in swallowing, speech, language or cognition. RN reports no concerns. No SLP needs at this time.
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
## 2019-07-20 NOTE — ROUTINE PROCESS
Reviewed discharge instructions, follow up orders and medications with patient. All questions answered.

## 2019-07-20 NOTE — PROGRESS NOTES
CM acknowledges discharge order. Pt is to discharge home with [facility] to follow. No further questions or needs identified at this time. CM to continue to remain available for support, discharge planning as needed.      Karely Hidalgo, MSW, LSW  Supervisee in Social Work  [facility]  [phone]
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
## 2019-07-22 NOTE — DISCHARGE SUMMARY
Hospitalist Discharge Summary     Patient ID:  Bebeto Alvarez  [id number]  80 y.o.  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 Embolic CVA on 9/30 and FLYNN negative  HTN  IDDM (insulin dependent diabetes mellitus)   COPD (chronic obstructive pulmonary disease)  Chronic Diastolic Heart Failure  CKD (chronic kidney disease) stage 3, GFR 30-59 ml/min            CONSULTATIONS:  IP CONSULT TO NEUROLOGY  IP CONSULT TO NEUROLOGY  IP CONSULT TO CARDIOLOGY    Excerpted HPI from H&P of Ann Winston MD:  Keven Bravo is a 80 y.o.  male who presents with dizziness and stumbling gate. Pt noted to be admitted recently for acute stroke and found to have bilateral occipital CVA. Pt reported now symptoms started earlier today and when didn't get better then he came to ED. Pt denies any focal weakness, headaches, nausea, vomiting, diarrhea, chest pain, problems urination. In ED pt noted to have negative CT head per ED report who spoke to radiology, PACs is down so we are not able to read the report.        ______________________________________________________________________  DISCHARGE SUMMARY/HOSPITAL COURSE:  for full details see H&P, daily progress notes, labs, consult notes. 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 Embolic CVA on 1/42 and FLYNN negative  Has had recent bilateral posterior occipital CVA and underwent thorough workup including FLYNN. MRI brain shows new cva as above. Continue ASA and added Plavix per neurology. No need for [facility] per Dr Regina Talbert. Might benefit from out pt loop recorder placement and cards was consulted and they recommended out pt follow up for loop recorder placement. cont statin. PT has seen and recommended out pt pt follow up.  She was cleared by Neurology and cards to be discharged for out follow up. He is seen and examined, symptoms are better, vitals are stable, lab work is at [facility] and is being discharged in much improved condition for out follow up.       HTN  Cont losartan     IDDM (insulin dependent diabetes mellitus)   Increase NPH to  35 units BID   SSI  HbA1C is 10.5      COPD (chronic obstructive pulmonary disease)  Continue Px inhalers and PRN nebs     Chronic Diastolic Heart Failure  Takes PRN diuretics at home     CKD (chronic kidney disease) stage 3, GFR 30-59 ml/min   Cr is close to base line           _______________________________________________________________________  Patient seen and examined by me on discharge day. Pertinent Findings:  Gen:    Not in distress  Chest: Clear lungs  CVS:   Regular rhythm. No edema  Abd:  Soft, not distended, not tender  Neuro:  Alert, awake  _______________________________________________________________________  DISCHARGE MEDICATIONS:   Discharge Medication List as of 7/20/2019  3:10 PM      START taking these medications    Details   clopidogrel (PLAVIX) 75 mg tab Take 1 Tab by mouth daily for 30 days. , Normal, Disp-30 Tab, R-0         CONTINUE these medications which have CHANGED    Details   insulin NPH (HUMULIN N NPH INSULIN KWIKPEN) 100 unit/mL (3 mL) inpn 35u with breakfast and 35u with dinner, Normal, Disp-10 Pen, R-10         CONTINUE these medications which have NOT CHANGED    Details   albuterol (ACCUNEB) 1.25 mg/3 mL nebu Take 1.25 mg by inhalation. , Historical Med      ASMANEX TWISTHALER 220 mcg (120 doses) aepb inhaler TAKE 1 PUFF BY INHALATION TWO (2) TIMES A DAY, Historical Med, R-1, MATTIE      losartan (COZAAR) 25 mg tablet Take 1 Tab by mouth daily. , Normal, Disp-90 Tab, R-1      aspirin 81 mg chewable tablet Take 1 Tab by mouth daily. , Normal, Disp-30 Tab, R-0      HYDROcodone-acetaminophen (NORCO) 5-325 mg per tablet Take 1 Tab by mouth daily as needed for Pain., Historical Med      bumetanide (BUMEX) 2 mg tablet Take 1 Tab by mouth daily as needed., Normal, Disp-90 Tab, R-1      omeprazole (PRILOSEC) 20 mg capsule TAKE ONE CAPSULE BY MOUTH EVERY DAY, Normal**Patient requests 90 days supply**Disp-90 Cap, R-1      gabapentin (NEURONTIN) 300 mg capsule TAKE 1 CAPSULE BY MOUTH EVERY NIGHT, Normal, Disp-90 Cap, R-1      ergocalciferol (ERGOCALCIFEROL) 50,000 unit capsule Take 1 Cap by mouth every seven (7) days. , Normal, Disp-20 Cap, R-1      citalopram (CELEXA) 20 mg tablet TAKE 1 TABLET BY MOUTH EVERY MORNING AS NEEDED FOR ANXIETY DEPPRESSION, Normal, Disp-90 Tab, R-1      Blood-Glucose Meter (ACCU-CHEK AGATHA PLUS METER) misc Monitor blood sugar twice daily. ICD 10 Code: E11.65, Normal, Disp-1 Each, R-0      Blood-Glucose Meter (CONTOUR METER) monitoring kit Monitor BS twice daily, Normal, Disp-1 Kit, R-0      ASCENSIA CONTOUR strip MONITOR BLOOD SUGAR TWICE DAILY, Normal**Patient requests 90 days supply**Disp-150 Strip, R-10      rosuvastatin (CRESTOR) 40 mg tablet Take 1 Tab by mouth nightly., Normal, Disp-90 Tab, R-1      metolazone (ZAROXOLYN) 5 mg tablet Take 5 mg by mouth daily as needed., Historical Med      ezetimibe (ZETIA) 10 mg tablet Take 10 mg by mouth daily. , Historical Med               Patient Follow Up Instructions: Activity: Activity as tolerated  Diet: Cardiac Diet  Wound Care: None needed    Follow-up with as below   Follow-up tests/labs     Cbc and Bmp in 1 week    Follow-up Information     Follow up With Specialties Details Why [address] will see you for therapy and a nurse will be out to see you when discharged.   [phone]    Saranya Garcias MD Family Practice Schedule an appointment as soon as possible for a visit in 1 week  [address] [facility]  MOB 1 1165 [facility]  8978 [facility]      Humphrey Wren MD Neurology Schedule an appointment as soon as possible for a visit in 2 weeks  [address] [place] [address] MOB 2  [address]  [phone]      Nam Asencio MD Cardiology, [address] [facility] Vascular Surgery, Internal Medicine Schedule an appointment as soon as possible for a visit in 1 week  24[address] [facility]  [phone]      [facility], [address] [place] [address] 1 1165 [facility]   [address]  [phone]          ________________________________________________________________    Risk of deterioration: High    Condition at Discharge:  Stable  __________________________________________________________________    Disposition  Home with family and home health services    ____________________________________________________________________    Code Status: Full Code  ___________________________________________________________________      Total time in minutes spent coordinating this discharge (includes going over instructions, follow-up, prescriptions, and preparing report for sign off to her PCP) :  35  minutes    Signed:  Juli Cortez MD

## 2019-07-22 NOTE — DISCHARGE INSTRUCTIONS
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 Embolic CVA on  and FLYNN negative       HTN       IDDM (insulin dependent diabetes mellitus)        COPD (chronic obstructive pulmonary disease)       Chronic Diastolic Heart Failure       CKD (chronic kidney disease) stage 3, GFR 30-59 ml/min         Take Home Medications     {Medication reconciliation information is now added to the patient's AVS automatically when it is printed. There is no need to use this SmartLink in discharge instructions. Highlight this text and delete it to clear this message}      General drug facts     If you have a very bad allergy, wear an allergy ID at all times. It is important that you take the medication exactly as they are prescribed. Keep your medication in the bottles provided by the pharmacist.  Keep a list of all your drugs (prescription, natural products, vitamins, OTC) with you. Give this list to your doctor. Do not take other medications without consulting your doctor. Do not share your drugs with others and do not take anyone else's drugs. Keep all drugs out of the reach of children and pets. Most drugs may be thrown away in household trash after mixing with coffee grounds or luba litter and sealing in a plastic bag. Keep a list Call your doctor for help with any side effects. If in the [place] you may also call the FDA at 2-207-FDA-2210    Talk with the doctor before starting any new drug, including OTC, natural products, or vitamins. What to do at Home    1. Recommended diet: Cardiac    2. Recommended activity: Activity as tolerated but no driving    3. If you experience any of the following symptoms then please call your primary care physician or return to the emergency room if you cannot get hold of your doctor:    4. Wound Care: none    5. Lab work: none    6. Bring these papers with you to your follow up appointments. The papers will help your doctors be sure to continue the care plan from the hospital.      Follow-up with:   PCP: Shannan Baird MD  Follow-up Information     Follow up With Specialties Details Why [address] will see you for therapy and a nurse will be out to see you when discharged. [phone]    Shannan Baird MD Family Practice Schedule an appointment as soon as possible for a visit in 1 week  Tommy Ryan 150  [facility] 1 1165 [facility]  [facility] Winn MD Neurology Schedule an appointment as soon as possible for a visit in 2 weeks  [facility] 115      Mayelin Ventura MD Cardiology, 210 [facility] Vascular Surgery, Internal Medicine Schedule an appointment as soon as possible for a visit in 1 week  [address]  [phone]             Please call for your own appointment        Information obtained by :  I understand that if any problems occur once I am at home I am to contact my physician. I understand and acknowledge receipt of the instructions indicated above.                                                                                                                                            Physician's or R.N.'s Signature                                                                  Date/Time                                                                                                                                              Patient or Representative Signature                                                          Date/Time

## 2019-07-22 NOTE — PROGRESS NOTES
19 Patients daughter, Hans Valle who lives in [place],  called NN to report she does assist her father in  taking his medications by reminding him daily. Ms Pro Johnson reports she is looking into prepackaged pills and/ or setting up pill boxs. Ms Pro Johnson is medical power of  for patient but has not completed all paperwork she stated she will get paperwork prepared for patient and will send copy to NN. Ms Pro Johnson stated she will coordinate with her brother, Hannah Sherwood who lives in [facility] to get ACP paperwork completed. [address] [facility] Discharge Follow-Up      Date/Time:  2019 9:43 AM    Patient was admitted to [facility] on 19 and discharged on 19 for CVA. The physician discharge summary was available at the time of outreach. Patient was contacted within 1 business days of discharge. Top Challenges reviewed with the provider   Patient lives alone- eats all meals out in restaurants-reports 30 lb weight loss in 6 months    AIC 10. 5-Insulin dose changed as inpt- on admit 178 on d/c  Per patient he was taken off glipizide in hospital--may need Diabetic Education teaching appt with Nicolas Reed, pharm-d    NEEDS MEDICATION REVIEW. Rema Alatorre  Reports pt is taking medications not on d/c list-pt was instructed to bring all meds to PCP appt    19 patient scheduled for Loop Recorder placement- ? AFIB    Pt listed as DNI/DNR as inpt- DOES NOT WANT DNR completed    No ACP on file- needs forms completed             Method of communication with provider :face to face, chart routing, staff message, phone    Inpatient RRAT score: 64  Was this a readmission? yes   Patient stated reason for the readmission: dizziness    Nurse Navigator (NN) contacted the patient by telephone to perform post hospital discharge assessment. Verified name and  with patient as identifiers. Provided introduction to self, and explanation of the Nurse Navigator role.      Reviewed discharge instructions and red flags with patient who verbalized understanding. Patient given an opportunity to ask questions and does not have any further questions or concerns at this time. The patient agrees to contact the PCP office for questions related to their healthcare. NN provided contact information for future reference. Disease Specific:   N/A    Summary of patient's top problems:  1. 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 Embolic CVA on 8/30 and FLYNN negative  Has had recent bilateral posterior occipital CVA and underwent thorough workup including FLYNN. MRI brain shows new tiny areas of acute ischemia in right cerebellum --Patient requests return of driving privileges- stated he was told by neurologist- Dr Sissy Wylie to not drive for 6 months after last CVA    2. IDDM (insulin dependent diabetes mellitus)   Increased  NPH to  35 units BID and per pt his glipizide was discontinued while inpt  HbA1C is 10.5 --BS was 377 on admit and 178 on d/c did drop to 53 while hospitalized    3. Patient lives alone and eats all meals out in restaurant, reports 2 sons in [place] 1 in [place], one adopted son near by and 2 daughters out of state- reports his children are supportative  Home Health orders at discharge: PT, OT, [address]: OTIS CEDEÑO [facility]  Date of initial visit: 7/22/19  Durable Medical Equipment ordered/company: none  Durable Medical Equipment received: none  Barriers to care? ineffective coping, lack of knowledge about disease, medication management, PCP relationship, support system, transportation, utilization of services    Advance Care Planning:   Does patient have an Advance Directive:  not on file; education provided     Medication(s):   New Medications at Discharge:  plavix 75 mg Daily  Changed Medications at Discharge: Insulin-NPH 32 units before Breakfast and Dinner  Patient stated he is in [place] and cannot complete medication review at this time.  He initially told NN he was taking all medications. Then he stated he is not taking many of his medications- NN to call patient later to reconcile medications when patient is at home. Will also request medication review be completed by Home Health on next home visit. Medication reconciliation was performed with patient, who verbalizes understanding of administration of home medications. There were no barriers to obtaining medications identified at this time. Referral to Pharm D needed: no     Current Outpatient Medications   Medication Sig    insulin NPH (HUMULIN N NPH INSULIN KWIKPEN) 100 unit/mL (3 mL) inpn 35u with breakfast and 35u with dinner    clopidogrel (PLAVIX) 75 mg tab Take 1 Tab by mouth daily for 30 days.  albuterol (ACCUNEB) 1.25 mg/3 mL nebu Take 1.25 mg by inhalation.  ASMANEX TWISTHALER 220 mcg (120 doses) aepb inhaler TAKE 1 PUFF BY INHALATION TWO (2) TIMES A DAY    losartan (COZAAR) 25 mg tablet Take 1 Tab by mouth daily.  aspirin 81 mg chewable tablet Take 1 Tab by mouth daily.  bumetanide (BUMEX) 2 mg tablet Take 1 Tab by mouth daily as needed.  omeprazole (PRILOSEC) 20 mg capsule TAKE ONE CAPSULE BY MOUTH EVERY DAY    gabapentin (NEURONTIN) 300 mg capsule TAKE 1 CAPSULE BY MOUTH EVERY NIGHT    ergocalciferol (ERGOCALCIFEROL) 50,000 unit capsule Take 1 Cap by mouth every seven (7) days.  citalopram (CELEXA) 20 mg tablet TAKE 1 TABLET BY MOUTH EVERY MORNING AS NEEDED FOR ANXIETY DEPPRESSION    Blood-Glucose Meter (ACCU-CHEK AGATHA PLUS METER) misc Monitor blood sugar twice daily. ICD 10 Code: E11.65    Blood-Glucose Meter (CONTOUR METER) monitoring kit Monitor BS twice daily    ASCENSIA CONTOUR strip MONITOR BLOOD SUGAR TWICE DAILY    rosuvastatin (CRESTOR) 40 mg tablet Take 1 Tab by mouth nightly.  metolazone (ZAROXOLYN) 5 mg tablet Take 5 mg by mouth daily as needed.  ezetimibe (ZETIA) 10 mg tablet Take 10 mg by mouth daily.     predniSONE (DELTASONE) 10 mg tablet Take 10 mg by mouth two (2) times a day.  methocarbamol (ROBAXIN) 750 mg tablet Take 750 mg by mouth daily.  HYDROcodone-acetaminophen (NORCO) 5-325 mg per tablet Take 1 Tab by mouth daily as needed for Pain. No current facility-administered medications for this visit. There are no discontinued medications. [facility] follow up appointment(s):   Future Appointments   Date Time Provider Jocelynn Montaño   7/24/2019 11:40 AM MD Jacob Yip [place]   7/25/2019 To Be Determined Danis Jaramillo LPN [address] [facility]   7/25/2019  2:00 PM Radha Nogueira  [facility]   7/29/2019 To Be Determined Scotty Wooten RN [address]   8/1/2019 To Be Determined Scotty Wooten RN [address]   8/5/2019 To Be Determined Scotty Wooten RN [address]   8/8/2019 To Be Determined Scotty Wooten RN [address]   8/12/2019 To Be Determined Scotty Wooten RN [address] [facility]   8/12/2019  3:00 PM PACEMAKER, [address]   8/15/2019  9:40 AM Jessa Weber  [facility]   8/15/2019 To Be Determined Scotty Wooten RN [address]   8/16/2019  9:40 AM Marzena Han MD [address] [place] [address]   8/21/2019 To Be Determined Scotty Wooten RN [address]   8/28/2019 To Be Determined Scotty Wooten RN [address]   9/4/2019 To Be Determined Scotty Wooten RN [address]   9/11/2019 To Be Determined Scotty Wooten  [address]   9/30/2019  8:50 AM Le Lamas MD RDE DANIELA [address]      Non-[facility] follow up appointment(s): TBD  [facility]:  information provided as a resource       Goals      [facility]      7/22/19 Patient was instructed in need for an ACP. It was stated in physician note while pt was hospitalized that patient wanted DNR/DNI. When this was approached with patient, he emphatically stated he wants full code and does not want a DDNR form completed.  Patient stated he has an ACP and his daughter in [place] has his paperwork. NN requested copy of this paperwork for our chart. Pt stated he would tell his daughter, Kathe Ortez to call NN to discuss patients ACP. Plan: Patients daughter to contact NN and discuss pts ACP and provide a copy of any documents. NN will continue to monitor status of ACP. NN will attempt to contact patient in ~7-10 days. DMB       Attends follow-up appointments as directed. 7/22/19 Pt is s/p second hospitalization for CVA in 3 weeks. Pt now has PCP appt scheduled for 7/24/19 at 1140. Follow up neurology appt was scheduled for 7/25/19 at 2 pm by NN, pt was made aware of appt time and place. Pt will see Dr Sabra Palacios for neurology appt at this time. Pt also has previously scheduled appt with Dr Judy Donovan on 8/15/19, no earlier appt could be scheduled. Dr Zulma Martinez not in office for 2 more weeks. Pt has appt for procedure on 7/29/19 for a loop recorder for possible AFIB to be placed by Dr Ling Kelsey, Cardiology. Per Dr Zenia Peacer office, pt does not need an earlier appt with Cardio. Patient is receiving home health services through [facility]. NN attempted to contact Dr Andrei Simons to determine if patient needs earlier appt with Dr Andrei Simons, endocrinologist.  Patient reports he has a neighbor who will provide transportation for patient to appts. Plan patient to attend all medical appts as scheduled. Pt to arrange transportation with family or friends. Pt to participate with [facility] to achieve plan of care goals. NN will continue to monitor pts attendance at medical appts. NN will attempt to f/u with patient in ~7-10 days. DMB       Supportive resources in place to maintain patient in the community (ie. Home Health, DME equipment, refer to, medication assistant plan, etc.)      7/22/19 Patient reports I am fine. Pt was eating breakfast at [employer] with a friend. Pt is now s/p second admission in 3 weeks for CVA.  Pt MRI results showed tiny areas of acute ischemia in right cerebellum and bilateral occipital area. Patient with history of ? Recent embolic CVA on 9/38/70 and FLYNN was normal.  Patient is scheduled for loop recorder placement to determine if patient has AFIB -possible cause of pts dizziness and weakness. Inpatient CM reported pt has unsteady gait. Pt is to receive PT, OT and SN services from [facility]. Home health nurse visited patient in the home today. She reported patient has all medications in the home that are listed on his discharge summary. However, patient has a number of older medications he reports he takes occasionally. NN instructed patient to bring all medications to his next PCP appt for clarification of which medications he is to take at this time. Per hospital notes, patient's insulin dose was changed to 32 units at breakfast and 32 units at night. Patient did have episode of low blood sugar as an inpatient. Patient was instructed to monitor his blood glucose levels at least 2 times daily, keep log and bring to PCP and other Specialist appts. Patient was instructed to SMBP daily, keep log and bring to PCP and other specialist appts. Pt reports he eats most of his meals out at restaunts- he stated he has lost 30 lbs in last 6 months without trying. Pt did report he eats big breakfast and tries to eat a salad or light meal at dinner time. Pt also goes out to eat because it is his social need for contact with other people. Pt lives alone and does not want to eat alone every meal. Pt was instructed not to drive until he sees his primary care physician. Pt stated he was told not to drive by neurology, but there does not seem to be a note in chart to this effect. Red flags, s/sx and action plan was reviewed. Pt was instructed that PCP and Specialist are available 24/7 for questions and concerns. NN and [facility] contact information was provided to pt. Pt verbalized understanding of instructions. Plan pt to keep log of BS and BP, bring to PCP and specialist appts.  Pt to take medications as prescribed on d/c summary. Pt to bring medications to next PCP appt for reconciliation. Pt to call PCP, Specialist or NN with questions or concerns. Pt to discuss his driving status with PCP and Specialist. NN to continue to monitor adherence to plan of care. NN to attempt to contact patient in ~7-10 days.  NICOLA

## 2019-07-23 NOTE — PROGRESS NOTES
Hospital Discharge Follow-Up      Date/Time:  2019 9:43 AM    Patient was admitted to [facility] on 19 and discharged on 19 for CVA. The physician discharge summary was available at the time of outreach. Patient was contacted within 1 business days of discharge. Top Challenges reviewed with the provider   Patient lives alone- eats all meals out in restaurants-reports 30 lb weight loss in 6 months    AIC 10. 5-Insulin dose changed as inpt- on admit 178 on d/c  Per patient he was taken off glipizide in hospital--may need Diabetic Education teaching appt with Kezia Lujan pharm-d    NEEDS MEDICATION REVIEW. Brigid Stratton  Reports pt is taking medications not on d/c list-pt was instructed to bring all meds to PCP appt    19 patient scheduled for Loop Recorder placement- ? AFIB    Pt listed as DNI/DNR as inpt- DOES NOT WANT DNR completed    No ACP on file- needs forms completed  Pt wants his driving status to be clarified- he wants to drive his own vehicle  Per patient he was told he could not drive for 6 months after prior hospitalization-              Method of communication with provider :face to face, chart routing, staff message, phone    Inpatient RRAT score: 64  Was this a readmission? yes   Patient stated reason for the readmission: dizziness    Nurse Navigator (NN) contacted the patient by telephone to perform post hospital discharge assessment. Verified name and  with patient as identifiers. Provided introduction to self, and explanation of the Nurse Navigator role. Reviewed discharge instructions and red flags with patient who verbalized understanding. Patient given an opportunity to ask questions and does not have any further questions or concerns at this time. The patient agrees to contact the PCP office for questions related to their healthcare. NN provided contact information for future reference.     Disease Specific:   N/A    Summary of patient's top problems:  1. 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 Embolic CVA on 3/67 and FLYNN negative  Has had recent bilateral posterior occipital CVA and underwent thorough workup including FLYNN. MRI brain shows new tiny areas of acute ischemia in right cerebellum --Patient requests return of driving privileges- stated he was told by neurologist- Dr Hilda Francis to not drive for 6 months after last CVA    2. IDDM (insulin dependent diabetes mellitus)   Increased  NPH to  35 units BID and per pt his glipizide was discontinued while inpt  HbA1C is 10.5 --BS was 377 on admit and 178 on d/c did drop to 53 while hospitalized    3. Patient lives alone and eats all meals out in restaurant, reports 2 sons in [place] 1 in [place], one adopted son near by and 2 daughters out of state- reports his children are supportative  Home Health orders at discharge: PT, OT, [address]: OTIS CEDE[facility]O [facility]  Date of initial visit: 7/22/19  Durable Medical Equipment ordered/company: none  Durable Medical Equipment received: none  Barriers to care? ineffective coping, lack of knowledge about disease, medication management, PCP relationship, support system, transportation, utilization of services    Advance Care Planning:   Does patient have an Advance Directive:  not on file; education provided     Medication(s):   New Medications at Discharge:  plavix 75 mg Daily  Changed Medications at Discharge:   Insulin-NPH 35 units before Breakfast and Dinner  Patient stated he is in resturant and cannot complete medication review at this time. He initially told NN he was taking all medications. Then he stated he is not taking many of his medications- NN to call patient later to reconcile medications when patient is at home. Will also request medication review be completed by Home Health on next home visit. Medication reconciliation was performed with patient, who verbalizes understanding of administration of home medications. There were no barriers to obtaining medications identified at this time. Referral to Pharm D needed: no     Current Outpatient Medications   Medication Sig    insulin NPH (HUMULIN N NPH INSULIN KWIKPEN) 100 unit/mL (3 mL) inpn 35u with breakfast and 35u with dinner    clopidogrel (PLAVIX) 75 mg tab Take 1 Tab by mouth daily for 30 days.  albuterol (ACCUNEB) 1.25 mg/3 mL nebu Take 1.25 mg by inhalation.  ASMANEX TWISTHALER 220 mcg (120 doses) aepb inhaler TAKE 1 PUFF BY INHALATION TWO (2) TIMES A DAY    losartan (COZAAR) 25 mg tablet Take 1 Tab by mouth daily.  aspirin 81 mg chewable tablet Take 1 Tab by mouth daily.  bumetanide (BUMEX) 2 mg tablet Take 1 Tab by mouth daily as needed.  omeprazole (PRILOSEC) 20 mg capsule TAKE ONE CAPSULE BY MOUTH EVERY DAY    gabapentin (NEURONTIN) 300 mg capsule TAKE 1 CAPSULE BY MOUTH EVERY NIGHT    ergocalciferol (ERGOCALCIFEROL) 50,000 unit capsule Take 1 Cap by mouth every seven (7) days.  citalopram (CELEXA) 20 mg tablet TAKE 1 TABLET BY MOUTH EVERY MORNING AS NEEDED FOR ANXIETY DEPPRESSION    Blood-Glucose Meter (ACCU-CHEK AGATHA PLUS METER) misc Monitor blood sugar twice daily. ICD 10 Code: E11.65    Blood-Glucose Meter (CONTOUR METER) monitoring kit Monitor BS twice daily    ASCENSIA CONTOUR strip MONITOR BLOOD SUGAR TWICE DAILY    rosuvastatin (CRESTOR) 40 mg tablet Take 1 Tab by mouth nightly.  metolazone (ZAROXOLYN) 5 mg tablet Take 5 mg by mouth daily as needed.  ezetimibe (ZETIA) 10 mg tablet Take 10 mg by mouth daily.  predniSONE (DELTASONE) 10 mg tablet Take 10 mg by mouth two (2) times a day.  methocarbamol (ROBAXIN) 750 mg tablet Take 750 mg by mouth daily.  HYDROcodone-acetaminophen (NORCO) 5-325 mg per tablet Take 1 Tab by mouth daily as needed for Pain. No current facility-administered medications for this visit. There are no discontinued medications.     [facility] [place] [facility](s):   Future Appointments Date Time Provider Jocelynn Montaño   7/24/2019 11:40 AM Svetlana Bragg MD [address]   7/25/2019 To Be Determined Flori Painter LPN [address] [facility]   7/25/2019  2:00 PM Alexandra Woodall MD [address]   7/29/2019 To Be Determined Zulma Martinez RN [address]   8/1/2019 To Be Determined Zulma Martinez RN [address]   8/5/2019 To Be Determined Zulma Martinez RN [address]   8/8/2019 To Be Determined Zulma Martinez RN [address]   8/12/2019 To Be Determined Zulma Martinez RN [address] [facility]   8/12/2019  3:00 PM PACEMAKER, [address]   8/15/2019  9:40 AM Silver Palomino MD [facility]   8/15/2019 To Be Determined Zulma Martinez RN [address]   8/16/2019  9:40 AM Svetlana Bragg MD [address] [place] [address]   8/21/2019 To Be Determined Zulma Martinez RN [address]   8/28/2019 To Be Determined Zulma Martinez RN [address]   9/4/2019 To Be Determined Zulma Martinez RN [address]   9/11/2019 To Be Determined Zulma Martinez RN [address] [place] [address]   9/30/2019  8:50 AM Maribel Oviedo MD RDE DANIELA [address]      Non-[facility] follow up appointment(s): TBD  [facility]:  information provided as a resource       Goals      ACP      7/22/19 Patient was instructed in need for an ACP. It was stated in physician note while pt was hospitalized that patient wanted DNR/DNI. When this was approached with patient, he emphatically stated he wants full code and does not want a DDNR form completed. Patient stated he has an ACP and his daughter in [place] has his paperwork. NN requested copy of this paperwork for our chart. Pt stated he would tell his daughter, Kathe Ortez to call NN to discuss patients ACP. Plan: Patients daughter to contact NN and discuss pts ACP and provide a copy of any documents. NN will continue to monitor status of ACP.  NN will attempt to contact patient in ~7-10 days. DMB       Attends follow-up appointments as directed. 7/22/19 Pt is s/p second hospitalization for CVA in 3 weeks. Pt now has PCP appt scheduled for 7/24/19 at 1140. Follow up neurology appt was scheduled for 7/25/19 at 2 pm by NN, pt was made aware of appt time and place. Pt will see Dr Yady Seymour for neurology appt at this time. Pt also has previously scheduled appt with Dr Mignon Nolen on 8/15/19, no earlier appt could be scheduled. Dr Shaan Griffiths not in office for 2 more weeks. Pt has appt for procedure on 7/29/19 for a loop recorder for possible AFIB to be placed by Dr Shanna Rhodes, Cardiology. Per Dr Mana Posadas office, pt does not need an earlier appt with Cardio. Patient is receiving home health services through [facility]. NN attempted to contact Dr Josefa Goss to determine if patient needs earlier appt with Dr Josefa Goss, endocrinologist.  Patient reports he has a neighbor who will provide transportation for patient to appts. Plan patient to attend all medical appts as scheduled. Pt to arrange transportation with family or friends. Pt to participate with [facility] to achieve plan of care goals. NN will continue to monitor pts attendance at medical appts. NN will attempt to f/u with patient in ~7-10 days. DMB       Supportive resources in place to maintain patient in the community (ie. Home Health, DME equipment, refer to, medication assistant plan, etc.)      7/22/19 Patient reports I am fine. Pt was eating breakfast at [employer] with a friend. Pt is now s/p second admission in 3 weeks for CVA. Pt MRI results showed tiny areas of acute ischemia in right cerebellum and bilateral occipital area. Patient with history of ? Recent embolic CVA on 7/96/86 and FLYNN was normal.  Patient is scheduled for loop recorder placement to determine if patient has AFIB -possible cause of pts dizziness and weakness. Inpatient CM reported pt has unsteady gait. Pt is to receive PT, OT and SN services from [facility].  Home health nurse visited patient in the home today. She reported patient has all medications in the home that are listed on his discharge summary. However, patient has a number of older medications he reports he takes occasionally. NN instructed patient to bring all medications to his next PCP appt for clarification of which medications he is to take at this time. Per hospital notes, patient's insulin dose was changed to 35 units at breakfast and 35 units at night. Patient did have episode of low blood sugar as an inpatient. Patient was instructed to monitor his blood glucose levels at least 2 times daily, keep log and bring to PCP and other Specialist appts. Patient was instructed to SMBP daily, keep log and bring to PCP and other specialist appts. Pt reports he eats most of his meals out at restaunts- he stated he has lost 30 lbs in last 6 months without trying. Pt did report he eats big breakfast and tries to eat a salad or light meal at dinner time. Pt also goes out to eat because it is his social need for contact with other people. Pt lives alone and does not want to eat alone every meal. Pt was instructed not to drive until he sees his primary care physician. Pt stated he was told not to drive by neurology, but there does not seem to be a note in chart to this effect. Red flags, s/sx and action plan was reviewed. Pt was instructed that PCP and Specialist are available 24/7 for questions and concerns. NN and [facility] contact information was provided to pt. Pt verbalized understanding of instructions. Plan pt to keep log of BS and BP, bring to PCP and specialist appts. Pt to take medications as prescribed on d/c summary. Pt to bring medications to next PCP appt for reconciliation. Pt to call PCP, Specialist or NN with questions or concerns. Pt to discuss his driving status with PCP and Specialist. NN to continue to monitor adherence to plan of care. NN to attempt to contact patient in ~7-10 days.  [facility]          7/23/19 Patients daughter, Thanh Howell who lives in [place],  called NN to report she does assist her father in  taking his medications by reminding him daily. Ms Leah Richards reports she is looking into prepackaged pills and/ or setting up pill boxs. Ms Leah Richards is medical power of  for patient but has not completed all paperwork she stated she will get paperwork prepared for patient and will send copy to NN. Ms Leah Richards stated she will coordinate with her brother, Jagjit Harper who lives in [facility] to get ACP paperwork completed.  DMB    Hos

## 2019-07-23 NOTE — TELEPHONE ENCOUNTER
Spoke with Mrs. Sujata Lee (Nurse Navigator at Dr. Curtis Pride) in regards to Mr. Goodwin getting a sooner appointment to see Dr. Jovita Ace due to Mr. Goodwin just recently being discharged from the hospital. I looked into it and found an 8 am appointment on 8/14/19 and offered that. Mrs. Sujata Lee spoke with Mr. Marvin Bowman and he agreed to the time and date. Mrs. Sujata Lee also informed me that Mr. Goodwin told her that in the middle of the night he woke up to a blood sugar of 42. He ate peanut butter crackers and had some orange juice to bring it back up and that this morning, before breakfast, his blood sugar was 159. He took 35 units of his NPH insulin at am. Mrs. Sujata Lee was wondering if Mr. Marvin Bowman should decrease his insulin back to what Dr. Jovita Ace prescribed for him to do and if so could Dr. Jovita Ace inform Mr. Marvin Bowman of what to do.

## 2019-07-23 NOTE — PROGRESS NOTES
7/23/19 [facility] for Dr Mark Kowalski returned NN call previously with notification of appt for patient on 8/14/19 0800. NN called pt and was informed that patient had low blood sugar last night of 42. Pt drank juice and ate peanut butter crackers on next check his blood sugar was 159. Call was placed to Mariah Chaparro to inform her of patients report of low blood glucose level. Mariah Chaparro stated she would notify Dr Mark Kowalski of pts swing in blood glucose levels. Mariah Chaparro stated she would call patient to inform patient of Dr Ynes Price instructions ref getting an earlier appt or if insulin doses were to be changed.  DMB

## 2019-07-23 NOTE — TELEPHONE ENCOUNTER
----- Message from Nya Villalobos sent at 7/23/2019  8:36 AM EDT -----  Regarding: /Telephone  General Message/Vendor Calls    Caller's first and last name: Sean Amalia      Reason for call: if sooner appt is needed      Callback required yes/no and why: yes      Best contact number(s):  [phone]    Details to clarify the request: Sean Holland a nurse navigator for  office called requesting a call back inquiring if pt needs a sooner appt .  Pt were discharged from the hospital July 21    Nya Villalobos

## 2019-07-24 NOTE — PROGRESS NOTES
Nisa Barahona is a 80 y.o. male    Chief Complaint   Patient presents with   [facility] Follow Up     7/18/2019-[facility] for dizziness/TIA     1. Have you been to the ER, urgent care clinic since your last visit? Hospitalized since your last visit? Yes When: 7/18/2019 Where: [facility] Reason for visit: Dizziness    2. Have you seen or consulted any other health care providers outside of the [address] [place] [address] since your last visit? Include any pap smears or colon screening.  No\    Health Maintenance Due   Topic Date Due    EYE EXAM RETINAL OR DILATED  09/20/1945    DTaP/Tdap/Td series (1 - Tdap) 09/20/1956    Shingrix Vaccine Age 50> (1 of 2) 09/20/1985    FOOT EXAM Q1  05/21/2019    MEDICARE YEARLY EXAM  08/17/2019     Visit Vitals  /52 (BP 1 Location: Left arm, BP Patient Position: Sitting)   Pulse 95   Temp 96.5 °F (35.8 °C) (Oral)   Resp 17   Ht 5' 11\" (1.803 m)   Wt 211 lb 6.4 oz (95.9 kg)   SpO2 95%   BMI 29.48 kg/m²

## 2019-07-24 NOTE — PROGRESS NOTES
Caryle Feller is a 80 y.o. male     has a past medical history of Abnormal prostate exam, ACP (advance care planning) (5/25/2017), Arthritis, Asbestosis(501), CAD (coronary artery disease), Calculus of kidney, Chronic pain syndrome (8/16/2018), CKD (chronic kidney disease) stage 3, GFR 30-59 ml/min ([facility]) (6/6/2016), Congestive heart failure ([facility] COPD, Depression, Essential hypertension with goal blood pressure less than 140/90 (6/6/2016), GERD (gastroesophageal reflux disease), Heart failure ([facility] Mild intermittent asthma with acute exacerbation (6/6/2016), Other ill-defined conditions(799.89), PUD (peptic ulcer disease), Stroke ([facility] (07/18/2019), Uncontrolled type II diabetes mellitus ([facility] (6/6/2016), and Urinary incontinence (4/3/2018). His children are out of state. His wife passed last year. He lives alone. He continues to work as a  usually from 3:30 in the afternoon to 11 at night. But because of staff shortages, he sometimes works from 3:30 in the afternoon to 730 the next morning. The job is entirely sedentary. Since our last visit he have seen orthopedic for a closed nondisplaced fracture of foot. Note reviewed, to f/u there in next week for repeat X-ray foot. Saw Endo for diabetes, notes reviewed. Uncontrolled, large contributing factor his diet. Hospital admission f/u. Admit date: 7/18/2019  Discharge date and time: 7/21/2019    He just had a stroke/tia in late June was put on aspirin. Another CVA, this time cerebellum and bila occipital lobes. plavix was added    F/u with cardiology for cardiac loop monitor     He have apt with neuro coming up. He was fired from his job. Skilled nursing, PT, OT, speech, and homehealth coming by. No more dizziness since. Denies falling. HTN: BP is at goal.     Continue course     HLD: crestor 40mg and zetia 10mg.       CKD with nephropathy. 2nd to uncontrolled DM2.     Cr. At baseline 1. 35-1.39    Chronic pain and Diabetic neuropathy bilat feet, norco 5/325mg #25 with a refill will last him 4 months. He only takes them seldomly and PRN for pain from his neuropathy. Discussed side effect and potential for drowsiness with norco and not to drive with it. DM2: he have f/u with [facility] Dr. Rochelle Dahl    A1C 10.4% 07/2019, 9.5% 03/2019, 08/2018 13.5%. Humulin N 25u BID  Uncontrolled, large contributing factor his diet. Reviewed: active problem list, medication list, allergies, notes from last encounter, lab results    A comprehensive review of systems was negative except for that written in the HPI. No Known Allergies  Current Outpatient Medications on File Prior to Visit   Medication Sig Dispense Refill    predniSONE (DELTASONE) 10 mg tablet Take 10 mg by mouth two (2) times a day.  methocarbamol (ROBAXIN) 750 mg tablet Take 750 mg by mouth daily.  insulin NPH (HUMULIN N NPH INSULIN KWIKPEN) 100 unit/mL (3 mL) inpn 35u with breakfast and 35u with dinner 10 Pen 10    clopidogrel (PLAVIX) 75 mg tab Take 1 Tab by mouth daily for 30 days. 30 Tab 0    albuterol (ACCUNEB) 1.25 mg/3 mL nebu Take 1.25 mg by inhalation.  ASMANEX TWISTHALER 220 mcg (120 doses) aepb inhaler TAKE 1 PUFF BY INHALATION TWO (2) TIMES A DAY  1    losartan (COZAAR) 25 mg tablet Take 1 Tab by mouth daily. 90 Tab 1    aspirin 81 mg chewable tablet Take 1 Tab by mouth daily. 30 Tab 0    HYDROcodone-acetaminophen (NORCO) 5-325 mg per tablet Take 1 Tab by mouth daily as needed for Pain.  bumetanide (BUMEX) 2 mg tablet Take 1 Tab by mouth daily as needed. 90 Tab 1    omeprazole (PRILOSEC) 20 mg capsule TAKE ONE CAPSULE BY MOUTH EVERY DAY 90 Cap 1    gabapentin (NEURONTIN) 300 mg capsule TAKE 1 CAPSULE BY MOUTH EVERY NIGHT 90 Cap 1    ergocalciferol (ERGOCALCIFEROL) 50,000 unit capsule Take 1 Cap by mouth every seven (7) days.  20 Cap 1    citalopram (CELEXA) 20 mg tablet TAKE 1 TABLET BY MOUTH EVERY MORNING AS NEEDED FOR ANXIETY DEPPRESSION 90 Tab 1    Blood-Glucose Meter (ACCU-CHEK AGATHA PLUS METER) misc Monitor blood sugar twice daily. ICD 10 Code: E11.65 1 Each 0    Blood-Glucose Meter (CONTOUR METER) monitoring kit Monitor BS twice daily 1 Kit 0    ASCENSIA CONTOUR strip MONITOR BLOOD SUGAR TWICE DAILY 150 Strip 10    rosuvastatin (CRESTOR) 40 mg tablet Take 1 Tab by mouth nightly. 90 Tab 1    metolazone (ZAROXOLYN) 5 mg tablet Take 5 mg by mouth daily as needed.  ezetimibe (ZETIA) 10 mg tablet Take 10 mg by mouth daily. No current facility-administered medications on file prior to visit.       Patient Active Problem List   Diagnosis Code    Gynecomastia N62    IDDM (insulin dependent diabetes mellitus) ([facility] E11.9, Z79.4    COPD (chronic obstructive pulmonary disease) ([facility]) J44.9    History of chronic CHF Z86.79    Constipation K59.00    CKD (chronic kidney disease) stage 3, GFR 30-59 ml/min ([facility]) N18.3    Uncontrolled type II diabetes mellitus ([facility] E11.65    Essential hypertension with goal blood pressure less than 140/90 I10    Mild intermittent asthma with acute exacerbation J45.21    ACP (advance care planning) Z71.89    Vitamin D deficiency E55.9    Type 2 diabetes mellitus with nephropathy ([facility]) E11.21    Recurrent depression ([facility]) F33.9    Urinary incontinence R32    Type 2 diabetes mellitus with diabetic neuropathy ([facility]) E11.40    Chronic pain syndrome G89.4    TIA (transient ischemic attack) M78.4    Embolic stroke involving left vertebral artery ([facility]) C72.320    Bilateral carotid artery stenosis I65.23    Diabetic peripheral neuropathy associated with type 2 diabetes mellitus ([facility]) E11.42    Post concussion syndrome F07.81    Post-concussion headache G44.309    Post-concussion vertigo F07.81, R42    Altered mental status, unspecified R41.82    Convulsive syncope R55    CVA (cerebral vascular accident) ([facility] I63.9    Dizziness R42       Visit Vitals  BP 135/52 (BP 1 Location: Left arm, BP Patient Position: Sitting)   Pulse 95   Temp 96.5 °F (35.8 °C) (Oral)   Resp 17   Ht 5' 11\" (1.803 m)   Wt 211 lb 6.4 oz (95.9 kg)   SpO2 95%   BMI 29.48 kg/m²     General appearance: alert, cooperative, no distress, appears stated age  Neurologic: Alert and oriented X 3, normal strength and tone, symmetric. Normal without focal findings. Cranial nerves 2-12 intact. Normal coordination and gait. Mental status: Alert, oriented, thought content appropriate, affect: stable, mood-congruent. Head: Normocephalic, without obvious abnormality, atraumatic  Eyes: conjunctivae/corneas clear. PERRL, EOM's intact. Neck: supple, symmetrical, trachea midline, no JVD  Lungs: clear to auscultation bilaterally  Heart: regular rate and rhythm, S1, S2 normal, no murmur, click, rub or gallop  Abdomen: soft, non-tender. Extremities: extremities normal, atraumatic, no cyanosis or edema      Assessment/Plans: We discussed if he needs help at home with nursing, PT, or walker, scooters for long distance to let us know. Diagnoses and all orders for this visit:    1. Hospital discharge follow-up    2. History of CVA (cerebrovascular accident)    3. Uncontrolled type 2 diabetes mellitus with hyperglycemia ([address]    4. CKD (chronic kidney disease) stage 3, GFR 30-59 ml/min ([facility])    5. Essential hypertension      Discussed plans, risk/benefits of treatments/observations. Through the use of shared decision making, above plans were agreed upon. Medication compliance advised. Patient verbalized understanding. Follow-up and Dispositions    · Return in about 3 months (around 10/24/2019) for chronic care, meds refill.          Jill Avitia MD  7/24/2019

## 2019-07-25 NOTE — PROGRESS NOTES
7/25/19 Patient's daughter called in to NN to question why her father's neurology appt with Dr Sharmila Schultz was rescheduled for 8/13/19. Dtr is requesting patient be seen earlier by neurology. Nai, nurse for Dr hSarmila Schultz returned NN call and stated patient was last seen by neurology 6 days ago. Per Dr Sharmila Schultz, pt needs to wait at least 2 weeks and complete other therapies before he can be seen by neurology. Nai stated she would call patients daughter to explain situation and confirm next appt date.  NICKB

## 2019-07-26 NOTE — TELEPHONE ENCOUNTER
----- Message from Lenora Meckel sent at 7/26/2019  2:31 PM EDT -----  Regarding: Dr. Arsenio Ojeda   Pt stated that he was put a on restriction by the hospital and wanted to know if he can be taking off so he can drive again. Best contact number is [phone].

## 2019-07-29 NOTE — DISCHARGE INSTRUCTIONS
LOOP RECORDER  REMOVAL DISCHARGE INSTRUCTIONS        Patient ID:  Bobbi Montanez  [id number]  80 y.o.  1935    Admit Date: 7/29/2019    Discharge Date: 7/29/2019     Admitting Physician: Rachel Alvarez MD     Discharge Physician: Rachel Alvarez MD    Admission Diagnoses:   Cardiomyopathy, unspecified type [facility]) [I42.9]    Discharge Diagnoses: Active Problems:    * No active hospital problems. *      Discharge Condition: Good    Cardiology Procedures this Admission:  S/P Loop removal.     Disposition: home    Reference discharge instructions provided by nursing for diet and activity. Follow-up with ICD/PM clinic in 2 weeks. Call [phone] to make an appointment. DISCHARGE INSTRUCTIONS    1. Call if you experience the following:    A.  A bruise that does not go away   B. Soreness or yellow, green, or brown drainage from the site. C. Any swelling from the site. D. If you have a fever of 100 degrees or higher that lasts for a few days      INCISION CARE       1.  Leave dressing over your site for at least 2 days  2. Leave steri-strips (if present) over your site until they start to fall off.   3.   You may shower after 1 week after as long as your incision isnt submerged or directly sprayed upon until well healed. 4.  For comfort, wear loose fitting clothing. 5.  Ice pack to affected shoulder for first 24 hours. 6.  Report any signs of infection, fever, pain, swelling, redness, oozing, or heat at site especially if these symptoms increase after the first 3 to 4 days. ACTIVITY PRECAUTIONS     1. Avoid rough contact with the implant site.

## 2019-07-29 NOTE — INTERVAL H&P NOTE
H&P Update:  Margarita Eugene. was seen and examined. History and physical has been reviewed. The patient has been examined.  There have been no significant clinical changes since the completion of the originally dated History and Physical.

## 2019-07-29 NOTE — TELEPHONE ENCOUNTER
Tried to call patient, vm full. Per Dr. Soliman Douse needs to discuss with Neuro to remove restrictions.

## 2019-07-29 NOTE — Clinical Note
Left chest clipped, prepped with ChloraPrep and draped. Wet prep solution applied at: 846. Wet prep solution dried at: 849. Wet prep elapsed drying time: 3 mins.

## 2019-07-30 NOTE — TELEPHONE ENCOUNTER
Spoke with patient. Per Dr. Schmitt Prude will have to get Neuro to release from driving restrictions.

## 2019-07-30 NOTE — TELEPHONE ENCOUNTER
Pt called wanting to no if he can get back on driving/ he was on driving restricting. PT stated that he was on 3 month driving restrictions. And pt stated that he's at home and he's feeling much better.       Best contact number to reach out to pt is [phone]

## 2019-08-08 NOTE — PROGRESS NOTES
Goals Addressed This Visit's Progress  COMPLETED: ACP     
  8/8/19 Patient stated his daughter in [place] has his paperwork and he does not want to fill out anything else. No further discuss per patient. [facility] 
7/22/19 Patient was instructed in need for an ACP. It was stated in physician note while pt was hospitalized that patient wanted DNR/DNI. When this was approached with patient, he emphatically stated he wants full code and does not want a DDNR form completed. Patient stated he has an ACP and his daughter in [place] has his paperwork. NN requested copy of this paperwork for our chart. Pt stated he would tell his daughter, Magan Romero to call NN to discuss patients ACP. Plan: Patients daughter to contact NN and discuss pts ACP and provide a copy of any documents. NN will continue to monitor status of ACP. NN will attempt to contact patient in ~7-10 days. DMB  Attends follow-up appointments as directed. 8/8/19 Pt has attended all medical appointments as scheduled. The following list of appointments were reviewed with the patient:  8/12/2019 3:00 PM PACEMAKER, [facility] 8/13/2019 12:20 PM Marcel Man MD Neurology [facility] at [facility] 8/14/2019 8:00 AM MD [facility] 8/16/2019 9:40 AM Terri Menon MD [facility] 
9/30/2019 8:50 AM MD Z[facility] 10/24/2019 11:00 AM Terri Menon MD [facility]. Patient stated he is aware of these appointments and has the dates written down. Pt reports his friend will provide transportation to these appointments. Patient has completed course of care with [address]. Plan patient to attend medical appointments as scheduled. Pt's family/friend to provide transportation.  NN will continue to monitor attendence at medical appointments. NN will attempt to contact patient in ~ 7-10 days. [facility] 
7/22/19 Pt is s/p second hospitalization for CVA in 3 weeks. Pt now has PCP appt scheduled for 7/24/19 at 1140. Follow up neurology appt was scheduled for 7/25/19 at 2 pm by NN, pt was made aware of appt time and place. Pt will see Dr Sabra Palacios for neurology appt at this time. Pt also has previously scheduled appt with Dr Judy Donovan on 8/15/19, no earlier appt could be scheduled. Dr Zulma Martinez not in office for 2 more weeks. Pt has appt for procedure on 7/29/19 for a loop recorder for possible AFIB to be placed by Dr Ling Kelsey, Cardiology. Per Dr Zenia Lozoya office, pt does not need an earlier appt with Cardio. Patient is receiving home health services through [facility]. NN attempted to contact Dr Andrei Simons to determine if patient needs earlier appt with Dr Andrei Simons, endocrinologist.  Patient reports he has a neighbor who will provide transportation for patient to appts. Plan patient to attend all medical appts as scheduled. Pt to arrange transportation with family or friends. Pt to participate with [facility] to achieve plan of care goals. NN will continue to monitor pts attendance at medical appts. NN will attempt to f/u with patient in ~7-10 days. DMB  Supportive resources in place to maintain patient in the community (ie. Home Health, DME equipment, refer to, medication assistant plan, etc.)     
  8/8/19 Pt stated he is doing fine. Pt reports he is eating lunch at [facility]. Pt stated he usually eats out one meal a day and gets salads to take home for other meals. Pt continues to report he is losing weigh- \" I gained some of the weight back but I have to take up my belt 4-5 inches. Pt reports he went to have the loop recorder implanted and is not having any issues at this time. Pt will f/u with Cardiology in next few weeks. Pt reports he is not having any s/sx of stroke at this time.  Pt reports he is not having s/sx that made him go to hospital last time. Pt reports he is checking his blood glucose levels 2-3 times a day. Today he reports 140 this morning and 170 last night. Pt stated his blood sugar has not been low lately. He is aware of s/sx of low blood glucose reading. Medication reconciliation was completed. Pt reports he is taking all medications as prescribed. Pt does not report any barriers to obtaining medications. Pt reports he went back to work as  but was laid off since he needed to walk with cane and could not do the walking rounds. HH is no longer working with patient. Patient was reminded to call PCP or Specialist with questions or concerns. Plan pt to take all medications as prescribed. Pt to monitor blood glucose levels daily, keep log and take to PCP and specialist.. Pt to report any low blood glucose levels to his physicians. Patient to continue to work on his diet to include more fruits and vegetables. NN to monitor patient's adherence to plan of care. NN to attempt to contact patient in ~7- 10 days. [facility] 
7/22/19 Patient reports I am fine. Pt was eating breakfast at [employer] with a friend. Pt is now s/p second admission in 3 weeks for CVA. Pt MRI results showed tiny areas of acute ischemia in right cerebellum and bilateral occipital area. Patient with history of ? Recent embolic CVA on 3/30/81 and FLYNN was normal.  Patient is scheduled for loop recorder placement to determine if patient has AFIB -possible cause of pts dizziness and weakness. Inpatient CM reported pt has unsteady gait. Pt is to receive PT, OT and SN services from [facility]. Home health nurse visited patient in the home today. She reported patient has all medications in the home that are listed on his discharge summary. However, patient has a number of older medications he reports he takes occasionally.  NN instructed patient to bring all medications to his next PCP appt for clarification of which medications he is to take at this time. Per hospital notes, patient's insulin dose was changed to 35 units at breakfast and 35 units at night. Patient did have episode of low blood sugar as an inpatient. Patient was instructed to monitor his blood glucose levels at least 2 times daily, keep log and bring to PCP and other Specialist appts. Patient was instructed to SMBP daily, keep log and bring to PCP and other specialist appts. Pt reports he eats most of his meals out at restaunts- he stated he has lost 30 lbs in last 6 months without trying. Pt did report he eats big breakfast and tries to eat a salad or light meal at dinner time. Pt also goes out to eat because it is his social need for contact with other people. Pt lives alone and does not want to eat alone every meal. Pt was instructed not to drive until he sees his primary care physician. Pt stated he was told not to drive by neurology, but there does not seem to be a note in chart to this effect. Red flags, s/sx and action plan was reviewed. Pt was instructed that PCP and Specialist are available 24/7 for questions and concerns. NN and [facility] contact information was provided to pt. Pt verbalized understanding of instructions. Plan pt to keep log of BS and BP, bring to PCP and specialist appts. Pt to take medications as prescribed on d/c summary. Pt to bring medications to next PCP appt for reconciliation. Pt to call PCP, Specialist or NN with questions or concerns. Pt to discuss his driving status with PCP and Specialist. NN to continue to monitor adherence to plan of care. NN to attempt to contact patient in ~7-10 days. DMB

## 2019-08-13 NOTE — LETTER
8/13/19 Patient: Deidre Gannon. YOB: 1935 Date of Visit: 8/13/2019 Santa Najera MD 
[address] [address] [phone] [id number] VIA In Basket Dear Santa Najera MD, Thank you for referring Mr. Tianna Casey to [facility] for evaluation. My notes for this consultation are attached. If you have questions, please do not hesitate to call me. I look forward to following your patient along with you. Sincerely, Keshia Maza MD
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
## 2019-08-13 NOTE — PATIENT INSTRUCTIONS
High Cholesterol: Care Instructions  Your Care Instructions    Cholesterol is a type of fat in your blood. It is needed for many body functions, such as making new cells. Cholesterol is made by your body. It also comes from food you eat. High cholesterol means that you have too much of the fat in your blood. This raises your risk of a heart attack and stroke. LDL and HDL are part of your total cholesterol. LDL is the \"bad\" cholesterol. High LDL can raise your risk for heart disease, heart attack, and stroke. HDL is the \"good\" cholesterol. It helps clear bad cholesterol from the body. High HDL is linked with a lower risk of heart disease, heart attack, and stroke. Your cholesterol levels help your doctor find out your risk for having a heart attack or stroke. You and your doctor can talk about whether you need to lower your risk and what treatment is best for you. A heart-healthy lifestyle along with medicines can help lower your cholesterol and your risk. The way you choose to lower your risk will depend on how high your risk is for heart attack and stroke. It will also depend on how you feel about taking medicines. Follow-up care is a key part of your treatment and safety. Be sure to make and go to all appointments, and call your doctor if you are having problems. It's also a good idea to know your test results and keep a list of the medicines you take. How can you care for yourself at home? · Eat a variety of foods every day. Good choices include fruits, vegetables, whole grains (like oatmeal), dried beans and peas, nuts and seeds, soy products (like tofu), and fat-free or low-fat dairy products. · Replace butter, margarine, and hydrogenated or partially hydrogenated oils with olive and canola oils. (Canola oil margarine without trans fat is fine.)  · Replace red meat with fish, poultry, and soy protein (like tofu). · Limit processed and packaged foods like chips, crackers, and cookies.   · Bake, broil, or steam foods. Don't mtz them. · Be physically active. Get at least 30 minutes of exercise on most days of the week. Walking is a good choice. You also may want to do other activities, such as running, swimming, cycling, or playing tennis or team sports. · Stay at a healthy weight or lose weight by making the changes in eating and physical activity listed above. Losing just a small amount of weight, even 5 to 10 pounds, can reduce your risk for having a heart attack or stroke. · Do not smoke. When should you call for help? Watch closely for changes in your health, and be sure to contact your doctor if:    · You need help making lifestyle changes.     · You have questions about your medicine. Where can you learn more? Go to http://agnes-ca.info/. Enter V567 in the search box to learn more about \"High Cholesterol: Care Instructions. \"  Current as of: July 22, 2018  Content Version: 12.1  © 1697-4449 [employer]. Care instructions adapted under license by [employer] (which disclaims liability or warranty for this information). If you have questions about a medical condition or this instruction, always ask your healthcare professional. [place] [address] any warranty or liability for your use of this information. Learning About How to Prevent a Stroke  What is a stroke? A stroke occurs when a blood vessel in the brain bursts or is blocked by a blood clot. Without blood and the oxygen it carries, part of the brain starts to die. The part of the body controlled by the damaged area of the brain can't work properly. But there are many things you can do to help lower your stroke risk. What increases your risk for stroke? A risk factor is anything that makes you more likely to have a particular health problem.   Risk factors for stroke that you can treat or change include:  · Health problems like high blood pressure, atrial fibrillation, diabetes, and high cholesterol. · Smoking. · Heavy use of alcohol. · Being overweight. · Not getting enough physical activity. Risk factors you can't change include:  · Age. The risk of stroke goes up as you get older. · Race.  Americans, Native Americans, and [place] Natives have a higher risk than those of other races. · Being female. Women have a higher risk of stroke than men. · Family history of stroke. Your doctor can help you know your risk. Then you and your can doctor talk about whether you need to lower it. What can you do to prevent a stroke? · Treat any health problems you have that raise your risk. · Adopt a heart-healthy lifestyle:  ? Don't smoke. If you need help quitting, talk to your doctor about stop-smoking programs and medicines. These can increase your chances of quitting for good. ? Limit alcohol to 2 drinks a day for men and 1 drink a day for women. ? Stay at a healthy weight. Lose weight if you need to.  ? If your doctor recommends it, get more exercise. Walking is a good choice. Bit by bit, increase the amount you walk every day. Try for at least 30 minutes on most days of the week. ? Eat heart-healthy foods. These include fruits, vegetables, high-fiber foods, and fish. Choose foods that are low in sodium, saturated fat, and trans fat. · Decide with your doctor whether you will also take medicines to help lower your risk. For example, you and your doctor may decide you will take a medicine that prevents blood clots. What are the symptoms of a stroke? The brain damage from a stroke starts within minutes. That's why it's so important to know the symptoms of stroke and to act fast. Quick treatment can help limit damage to the brain so that you have fewer problems after the stroke. FAST is a simple way to remember the main symptoms of stroke. Recognizing these symptoms helps you know when to call for medical help. FAST stands for:  · Face drooping.   · Arm weakness. · Speech difficulty. · Time to call 911. Follow-up care is a key part of your treatment and safety. Be sure to make and go to all appointments, and call your doctor if you are having problems. It's also a good idea to know your test results and keep a list of the medicines you take. Where can you learn more? Go to http://agnes-ca.info/. Enter G757 in the search box to learn more about \"Learning About How to Prevent a Stroke. \"  Current as of: September 26, 2018  Content Version: 12.1  © 1635-5971 [employer]. Care instructions adapted under license by "[employer] (which disclaims liability or warranty for this information). If you have questions about a medical condition or this instruction, always ask your healthcare professional. [address] any warranty or liability for your use of this information. Learning About How to Prevent Another Stroke  What can you do to prevent another stroke? After a stroke, people feel lots of different emotions. Some people are worried that they could have another stroke. Or they may feel overwhelmed by how much there is to learn and do. Some people feel sad or depressed. No matter what emotions you are feeling, you can give yourself some control and peace of mind by making a plan to lower your risk of having another stroke. Take your medicines  You'll need to take medicines to help prevent another stroke. Be sure to take your medicines exactly as prescribed. And don't stop taking them unless your doctor tells you to. If you stop taking your medicines, you can increase your risk of having another stroke. Some of the medicines your doctor may prescribe include:  · Aspirin or some other blood thinner to prevent blood clots. · Statins and other medicines to lower cholesterol. · Blood pressure medicines to lower blood pressure.   Manage other health problems  You can help lower your chance of having another stroke by managing certain other health problems. Problems that increase your risk of having another stroke include:  · High blood pressure. · High cholesterol. · Atrial fibrillation. · Diabetes. If you have any of these health problems, you can manage them with healthy lifestyle changes along with medicine. Adopt a healthy lifestyle  · Do not smoke or allow others to smoke around you. If you need help quitting, talk to your doctor about stop-smoking programs and medicines. These can increase your chances of quitting for good. Smoking makes a stroke more likely. · Limit alcohol to 2 drinks a day for men and 1 drink a day for women. · Lose weight if you need to. Controlling your weight will help you keep your heart and body healthy. · Be active. Ask your doctor what type and level of activity is safe for you. · Eat heart-healthy foods, like fruits, vegetables, and high-fiber foods. It's also important to:  · Get a flu shot every year. · Ask for help if you think you are depressed. Do stroke rehab  Taking part in a stroke rehabilitation (rehab) program will help you to regain skills you lost or make the most of your abilities after a stroke. It also helps you take steps to prevent another stroke. Your rehab team will give you education and support to help you build new, healthy habits. You'll learn how to manage any other health problems that you might have. Caroline Sosa also learn how to exercise safely, eat a healthy diet, and quit smoking if you smoke. Caroline Sosa work with your team to decide what lifestyle choices are best for you. If your doctor hasn't already suggested it, ask him or her if stroke rehab is right for you. Know stroke symptoms  Make sure you know the symptoms of stroke. FAST is a simple way to remember. Recognizing these symptoms helps you know when to call for medical help. FAST stands for:  · Face drooping. · Arm weakness. · Speech difficulty.   · Time to call 911.  Follow-up care is a key part of your treatment and safety. Be sure to make and go to all appointments, and call your doctor if you are having problems. It's also a good idea to know your test results and keep a list of the medicines you take. Where can you learn more? Go to http://agnes-ca.info/. Enter W497 in the search box to learn more about \"Learning About How to Prevent Another Stroke. \"  Current as of: September 26, 2018  Content Version: 12.1  © 0857-5242 [employer]. Care instructions adapted under license by [employer] (which disclaims liability or warranty for this information). If you have questions about a medical condition or this instruction, always ask your healthcare professional. [address] any warranty or liability for your use of this information.

## 2019-08-16 NOTE — ACP (ADVANCE CARE PLANNING)
COMPLETED: ACP                 8/8/19 Patient stated his daughter in [facility] has his paperwork and he does not want to fill out anything else. No further discuss per patient. [facility]  7/22/19 Patient was instructed in need for an ACP. It was stated in physician note while pt was hospitalized that patient wanted DNR/DNI. When this was approached with patient, he emphatically stated he wants full code and does not want a DDNR form completed. Patient stated he has an ACP and his daughter in [place] has his paperwork. NN requested copy of this paperwork for our chart. Pt stated he would tell his daughter, Lexii Rees to call NN to discuss patients ACP. Plan: Patients daughter to contact NN and discuss pts ACP and provide a copy of any documents. NN will continue to monitor status of ACP.

## 2019-08-16 NOTE — PROGRESS NOTES
Daniel Rasheed is a 80 y.o. male     has a past medical history of Abnormal prostate exam, ACP (advance care planning) (5/25/2017), Arthritis, Asbestosis(501), CAD (coronary artery disease), Calculus of kidney, Chronic pain syndrome (8/16/2018), CKD (chronic kidney disease) stage 3, GFR 30-59 ml/min ([facility]) (6/6/2016), Congestive heart failure ([facility] COPD, Depression, Essential hypertension with goal blood pressure less than 140/90 (6/6/2016), GERD (gastroesophageal reflux disease), Heart failure ([facility] Mild intermittent asthma with acute exacerbation (6/6/2016), Other ill-defined conditions(799.89), PUD (peptic ulcer disease), Stroke ([facility] (07/18/2019), Uncontrolled type II diabetes mellitus ([facility] (6/6/2016), and Urinary incontinence (4/3/2018). His children are out of state. His wife passed last year. He lives alone. He continues to work as a  usually from 3:30 in the afternoon to 11 at night. But because of staff shortages, he sometimes works from 3:30 in the afternoon to 730 the next morning. The job is entirely sedentary. He just had a stroke/tia in late June was put on aspirin. Another CVA, this time cerebellum and bila occipital lobes. plavix was added    S/p pace maker by Dr. Chintan Ravi,     He have f/u with neuro. No more dizziness since. Denies falling. HTN: BP is at goal.     Continue course     HLD: crestor 40mg and zetia 10mg.       CKD with nephropathy. 2nd to uncontrolled DM2. Cr. At baseline 1.35-1.39    Chronic pain and Diabetic neuropathy bilat feet, norco 5/325mg #30 with a refill will last him 4 months. He only takes them seldomly and PRN for pain from his neuropathy. Discussed side effect and potential for drowsiness with norco and not to drive with it. Depression, mild recurrent on celexa. Sleep is ok. Denies SI or HI  He doesn't think therapy will help.       DM2: he have f/u with [facility] Dr. Colbert Gowers, he keeps missing  A1C 10.4% 07/2019, 9.5% 03/2019, 08/2018 13.5%. He report his BG 250s  Humulin N 25u BID  We'll up dose to 27u BID  Uncontrolled, large contributing factor his diet. Since our last visit he have seen orthopedic for a closed nondisplaced fracture of foot. Note reviewed, to f/u there in next week for repeat X-ray foot. Reviewed: active problem list, medication list, allergies, notes from last encounter, lab results    A comprehensive review of systems was negative except for that written in the HPI. Allergies   Allergen Reactions    Excedrin [Acetaminophen-Caffeine] Nausea Only     Current Outpatient Medications on File Prior to Visit   Medication Sig Dispense Refill    methocarbamol (ROBAXIN) 750 mg tablet Take 750 mg by mouth daily.  clopidogrel (PLAVIX) 75 mg tab Take 1 Tab by mouth daily for 30 days. 30 Tab 0    albuterol (ACCUNEB) 1.25 mg/3 mL nebu Take 1.25 mg by inhalation.  ASMANEX TWISTHALER 220 mcg (120 doses) aepb inhaler TAKE 1 PUFF BY INHALATION TWO (2) TIMES A DAY  1    losartan (COZAAR) 25 mg tablet Take 1 Tab by mouth daily. 90 Tab 1    aspirin 81 mg chewable tablet Take 1 Tab by mouth daily. 30 Tab 0    bumetanide (BUMEX) 2 mg tablet Take 1 Tab by mouth daily as needed. 90 Tab 1    omeprazole (PRILOSEC) 20 mg capsule TAKE ONE CAPSULE BY MOUTH EVERY DAY 90 Cap 1    gabapentin (NEURONTIN) 300 mg capsule TAKE 1 CAPSULE BY MOUTH EVERY NIGHT 90 Cap 1    ergocalciferol (ERGOCALCIFEROL) 50,000 unit capsule Take 1 Cap by mouth every seven (7) days. 20 Cap 1    citalopram (CELEXA) 20 mg tablet TAKE 1 TABLET BY MOUTH EVERY MORNING AS NEEDED FOR ANXIETY DEPPRESSION 90 Tab 1    Blood-Glucose Meter (ACCU-CHEK AGATHA PLUS METER) misc Monitor blood sugar twice daily.  ICD 10 Code: E11.65 1 Each 0    Blood-Glucose Meter (CONTOUR METER) monitoring kit Monitor BS twice daily 1 Kit 0    ASCENSIA CONTOUR strip MONITOR BLOOD SUGAR TWICE DAILY 150 Strip 10    rosuvastatin (CRESTOR) 40 mg tablet Take 1 Tab by mouth nightly. 90 Tab 1    metolazone (ZAROXOLYN) 5 mg tablet Take 5 mg by mouth daily as needed.  ezetimibe (ZETIA) 10 mg tablet Take 10 mg by mouth daily.  predniSONE (DELTASONE) 10 mg tablet Take 10 mg by mouth two (2) times a day. No current facility-administered medications on file prior to visit. Patient Active Problem List   Diagnosis Code    Gynecomastia N62    IDDM (insulin dependent diabetes mellitus) ([facility] E11.9, Z79.4    COPD (chronic obstructive pulmonary disease) ([facility]) J44.9    History of chronic CHF Z86.79    Constipation K59.00    CKD (chronic kidney disease) stage 3, GFR 30-59 ml/min ([facility]) N18.3    Uncontrolled type II diabetes mellitus ([facility] E11.65    Essential hypertension with goal blood pressure less than 140/90 I10    Mild intermittent asthma with acute exacerbation J45.21    ACP (advance care planning) Z71.89    Vitamin D deficiency E55.9    Type 2 diabetes mellitus with nephropathy ([facility]) E11.21    Recurrent depression ([facility]) F33.9    Urinary incontinence R32    Type 2 diabetes mellitus with diabetic neuropathy ([facility]) E11.40    Chronic pain syndrome G89.4    TIA (transient ischemic attack) C99.7    Embolic stroke involving left vertebral artery ([facility]) Y74.706    Bilateral carotid artery stenosis I65.23    Diabetic peripheral neuropathy associated with type 2 diabetes mellitus ([facility]) E11.42    Post concussion syndrome F07.81    Post-concussion headache G44.309    Post-concussion vertigo F07.81, R42    Altered mental status, unspecified R41.82    Convulsive syncope R55    CVA (cerebral vascular accident) ([facility] I63.9    Dizziness R42       Visit Vitals  /59   Pulse 94   Temp 96.6 °F (35.9 °C) (Oral)   Resp 20   Ht 5' 11\" (1.803 m)   Wt 204 lb 12.8 oz (92.9 kg)   SpO2 93%   BMI 28.56 kg/m²     General appearance: alert, cooperative, no distress, appears stated age  Neurologic: Alert and oriented X 3, normal strength and tone, symmetric.  Normal without focal findings. Cranial nerves 2-12 intact. Normal coordination and gait. Mental status: Alert, oriented, thought content appropriate, affect: stable, mood-congruent. Head: Normocephalic, without obvious abnormality, atraumatic  Eyes: conjunctivae/corneas clear. PERRL, EOM's intact. Neck: supple, symmetrical, trachea midline, no JVD  Lungs: clear to auscultation bilaterally  Heart: regular rate and rhythm, S1, S2 normal, no murmur, click, rub or gallop  Abdomen: soft, non-tender. Extremities: extremities normal, atraumatic, no cyanosis or edema      Assessment/Plans: We discussed if he needs help at home with nursing, PT, or walker, scooters for long distance to let us know. Diagnoses and all orders for this visit:    1. Essential hypertension with goal blood pressure less than 140/90    2. Uncontrolled type 2 diabetes mellitus with diabetic nephropathy, with long-term current use of insulin ([facility])  -     insulin NPH (HUMULIN N NPH INSULIN KWIKPEN) 100 unit/mL (3 mL) inpn; 27u with breakfast and 35u with dinner    3. Type 2 diabetes mellitus with nephropathy ([facility])  -     insulin NPH (HUMULIN N NPH INSULIN KWIKPEN) 100 unit/mL (3 mL) inpn; 27u with breakfast and 35u with dinner    4. Recurrent depression ([facility]    5. DM (diabetes mellitus), secondary, uncontrolled, with peripheral vascular complications ([facility]    6. History of CVA (cerebrovascular accident)    7. CKD (chronic kidney disease) stage 3, GFR 30-59 ml/min ([facility])    8. Chronic bilateral low back pain without sciatica  -     HYDROcodone-acetaminophen (NORCO) 5-325 mg per tablet; Take 1 Tab by mouth daily as needed for Pain for up to 30 days. 9. Chronic obstructive pulmonary disease, unspecified COPD type ([facility])  -     guaiFENesin ER (MUCINEX) 600 mg ER tablet; Take 1 Tab by mouth two (2) times a day. Discussed plans, risk/benefits of treatments/observations.    Through the use of shared decision making, above plans were agreed upon.   Medication compliance advised. Patient verbalized understanding. Follow-up and Dispositions    · Return in about 3 months (around 11/16/2019) for meds refill, labs.          Mohit Jalloh MD  8/16/2019

## 2019-08-16 NOTE — PROGRESS NOTES
Chief Complaint   Patient presents with    Follow Up Chronic Condition       1. Have you been to the ER, urgent care clinic since your last visit? Hospitalized since your last visit? no    2. Have you seen or consulted any other health care providers outside of the [address] [place] [address] since your last visit? Include any pap smears or colon screening.  yes

## 2019-09-20 NOTE — PROGRESS NOTES
9/20/19 Patient has graduated from this [facility] episode of care. [facility] Patient and his daughter have NN contact information for concerns or questions. Patient has not been rehospitalized in last 30 days. NICOLA

## 2019-10-02 NOTE — TELEPHONE ENCOUNTER
----- Message from Shaw Silveira sent at 10/2/2019 11:11 AM EDT -----  Regarding: Dr. Trent Chaparro  Caller: Silvestre Knows Denzel/daughter  Reason for call: Wants to know if PT needs both appts, 10/24 and 11/15? Callback requested: YES  Best contact: [phone]  Additional details: PT requested this information on 9/01 on [employer], but never received a response. Daughter wants to know why no one responded on [employer]VENTIVE. PT does not see Dr. Stanley Michaud until 12/20.

## 2019-11-06 NOTE — PATIENT INSTRUCTIONS
Diabetes and Preventing Falls: Care Instructions Your Care Instructions If you are an older adult who has diabetes, you may have a higher risk of falling. Complications of diabetessuch as nerve damage, foot problems, and reduced visionmay increase your risk of a fall. Some of your medicines also may add to your risk. By making your home safer, you can lower your risk of falling. Doing things to prevent diabetes complications may also help to lower your risk. You can make your home safer with a few simple measures. Follow-up care is a key part of your treatment and safety. Be sure to make and go to all appointments, and call your doctor if you are having problems. It's also a good idea to know your test results and keep a list of the medicines you take. How can you care for yourself at home? Taking care of yourself · Keep your blood sugar at a target level (which you set with your doctor). · Exercise regularly to improve your strength, muscle tone, and balance. Walk if you can. Swimming may be a good choice if you cannot walk easily. · Have your vision checked as often as your doctor recommends. It is usually once a year or more often if you have eye problems. · Know the side effects of the medicines you take. Ask your doctor or pharmacist whether the medicines you take can affect your balance. Sleeping pills or sedatives can affect your balance. · Limit the amount of alcohol you drink. Alcohol can impair your balance and other senses. · Have your doctor check your feet during each visit. If you have a foot problem, see your doctor. Preventing falls at home · Remove raised doorway thresholds, throw rugs, and clutter. Repair loose carpet or raised areas in the floor. · Move furniture and electrical cords to keep them out of walking paths. · Use nonskid floor wax, and wipe up spills right away, especially on ceramic tile floors. · If you use a walker or cane, put rubber tips on it. If you use crutches, clean the bottoms of them regularly with an abrasive pad, such as steel wool. · Keep your house well lit, especially Letta Sevin, and outside walkways. Use night-lights in areas such as hallways and bathrooms. Add extra light switches or use remote switches (such as switches that go on or off when you clap your hands) to make it easier to turn lights on if you have to get up during the night. · Install sturdy handrails on stairways. Put grab bars near your shower, bathtub, and toilet. · Store household items on low shelves so that you do not have to climb or reach high. Or use a reaching device that you can get at a medical supply store. If you have to climb for something, use a step stool with handrails, or ask someone to get it for you. · Keep a cordless phone and a flashlight with new batteries by your bed. If possible, put a phone in each of the main rooms of your house, or carry a cell phone in case you fall and cannot reach a phone. Or you can wear a device around your neck or wrist. You push a button that sends a signal for help. · Wear low-heeled shoes that fit well and give your feet good support. Use footwear with nonskid soles. Check the heels and soles of your shoes for wear. Repair or replace worn heels or soles. · Do not wear socks without shoes on wood floors. · Walk on the grass when the sidewalks are slippery. If you live in an area that gets snow and ice in the winter, sprinkle salt on slippery steps and sidewalks. Where can you learn more? Go to http://agnes-ca.info/. Enter I206 in the search box to learn more about \"Diabetes and Preventing Falls: Care Instructions. \" Current as of: November 7, 2018 Content Version: 12.2 © 6929-3613 [employer].  Care instructions adapted under license by [employer] (which disclaims liability or warranty for this information). If you have questions about a medical condition or this instruction, always ask your healthcare professional. [address] any warranty or liability for your use of this information. Learning About How to Prevent a Stroke What is a stroke? A stroke occurs when a blood vessel in the brain bursts or is blocked by a blood clot. Without blood and the oxygen it carries, part of the brain starts to die. The part of the body controlled by the damaged area of the brain can't work properly. But there are many things you can do to help lower your stroke risk. What increases your risk for stroke? A risk factor is anything that makes you more likely to have a particular health problem. Risk factors for stroke that you can treat or change include: 
· Health problems like high blood pressure, atrial fibrillation, diabetes, and high cholesterol. · Smoking. · Heavy use of alcohol. · Being overweight. · Not getting enough physical activity. Risk factors you can't change include: · Age. The risk of stroke goes up as you get older. · Race.  Americans, Native Americans, and [place] Natives have a higher risk than those of other races. · Being female. Women have a higher risk of stroke than men. · Family history of stroke. Your doctor can help you know your risk. Then you and your can doctor talk about whether you need to lower it. What can you do to prevent a stroke? · Treat any health problems you have that raise your risk. · Adopt a heart-healthy lifestyle: ? Don't smoke. If you need help quitting, talk to your doctor about stop-smoking programs and medicines. These can increase your chances of quitting for good. ? Limit alcohol to 2 drinks a day for men and 1 drink a day for women. ? Stay at a healthy weight. Lose weight if you need to. 
? If your doctor recommends it, get more exercise.  Walking is a good choice. Bit by bit, increase the amount you walk every day. Try for at least 30 minutes on most days of the week. ? Eat heart-healthy foods. These include fruits, vegetables, high-fiber foods, and fish. Choose foods that are low in sodium, saturated fat, and trans fat. · Decide with your doctor whether you will also take medicines to help lower your risk. For example, you and your doctor may decide you will take a medicine that prevents blood clots. What are the symptoms of a stroke? The brain damage from a stroke starts within minutes. That's why it's so important to know the symptoms of stroke and to act fast. Quick treatment can help limit damage to the brain so that you have fewer problems after the stroke. FAST is a simple way to remember the main symptoms of stroke. Recognizing these symptoms helps you know when to call for medical help. FAST stands for: 
· Face drooping. · Arm weakness. · Speech difficulty. · Time to call 911. Follow-up care is a key part of your treatment and safety. Be sure to make and go to all appointments, and call your doctor if you are having problems. It's also a good idea to know your test results and keep a list of the medicines you take. Where can you learn more? Go to http://agnes-ca.info/. Enter G757 in the search box to learn more about \"Learning About How to Prevent a Stroke. \" Current as of: September 26, 2018 Content Version: 12.2 © 8781-5140 [employer]. Care instructions adapted under license by [employer] (which disclaims liability or warranty for this information). If you have questions about a medical condition or this instruction, always ask your healthcare professional. [place] [address] any warranty or liability for your use of this information. Learning About How to Prevent Another Stroke What can you do to prevent another stroke? After a stroke, people feel lots of different emotions. Some people are worried that they could have another stroke. Or they may feel overwhelmed by how much there is to learn and do. Some people feel sad or depressed. No matter what emotions you are feeling, you can give yourself some control and peace of mind by making a plan to lower your risk of having another stroke. Take your medicines You'll need to take medicines to help prevent another stroke. Be sure to take your medicines exactly as prescribed. And don't stop taking them unless your doctor tells you to. If you stop taking your medicines, you can increase your risk of having another stroke. Some of the medicines your doctor may prescribe include: · Aspirin or some other blood thinner to prevent blood clots. · Statins and other medicines to lower cholesterol. · Blood pressure medicines to lower blood pressure. Manage other health problems You can help lower your chance of having another stroke by managing certain other health problems. Problems that increase your risk of having another stroke include: · High blood pressure. · High cholesterol. · Atrial fibrillation. · Diabetes. If you have any of these health problems, you can manage them with healthy lifestyle changes along with medicine. Adopt a healthy lifestyle · Do not smoke or allow others to smoke around you. If you need help quitting, talk to your doctor about stop-smoking programs and medicines. These can increase your chances of quitting for good. Smoking makes a stroke more likely. · Limit alcohol to 2 drinks a day for men and 1 drink a day for women. · Lose weight if you need to. Controlling your weight will help you keep your heart and body healthy. · Be active. Ask your doctor what type and level of activity is safe for you. · Eat heart-healthy foods, like fruits, vegetables, and high-fiber foods. It's also important to: · Get a flu shot every year. · Ask for help if you think you are depressed. Do stroke rehab Taking part in a stroke rehabilitation (rehab) program will help you to regain skills you lost or make the most of your abilities after a stroke. It also helps you take steps to prevent another stroke. Your rehab team will give you education and support to help you build new, healthy habits. You'll learn how to manage any other health problems that you might have. Patsy Greene also learn how to exercise safely, eat a healthy diet, and quit smoking if you smoke. Patsy Greene work with your team to decide what lifestyle choices are best for you. If your doctor hasn't already suggested it, ask him or her if stroke rehab is right for you. Know stroke symptoms Make sure you know the symptoms of stroke. FAST is a simple way to remember. Recognizing these symptoms helps you know when to call for medical help. FAST stands for: 
· Face drooping. · Arm weakness. · Speech difficulty. · Time to call 911. Follow-up care is a key part of your treatment and safety. Be sure to make and go to all appointments, and call your doctor if you are having problems. It's also a good idea to know your test results and keep a list of the medicines you take. Where can you learn more? Go to http://agnes-ca.info/. Enter Z625 in the search box to learn more about \"Learning About How to Prevent Another Stroke. \" Current as of: September 26, 2018 Content Version: 12.2 © 5595-3575 [employer]. Care instructions adapted under license by [employer] (which disclaims liability or warranty for this information). If you have questions about a medical condition or this instruction, always ask your healthcare professional. [address] any warranty or liability for your use of this information.
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
## 2019-11-06 NOTE — LETTER
11/6/19 Patient: Ross Wolfe. YOB: 1935 Date of Visit: 11/6/2019 Jodi Fabian MD 
[address] [facility] [address] [address] VIA In Basket Dear Jodi Fabian MD, Thank you for referring Mr. Sumaya Rojas to [facility] for evaluation. My notes for this consultation are attached. If you have questions, please do not hesitate to call me. I look forward to following your patient along with you. Sincerely, Emily Esteban MD

## 2019-11-11 NOTE — TELEPHONE ENCOUNTER
Spoke with Nemesio Grady and provided him with the number that we had. He stated that if anything changed because they don't participate with [facility] he would call me back.

## 2019-11-11 NOTE — TELEPHONE ENCOUNTER
----- Message from Zeferino Gaytan sent at 11/11/2019  1:31 PM EST -----  Regarding: Dr. Ede Garcia first and last name:  Joaquin Muir 18      Reason for call:  Needs pt's Medicare ID #      Callback required yes/no and why:  Yes, Pt's Medicare ID #      Best contact number(s):  [phone]      Details to clarify the request:  [employer] stated, they are not a participant with pt's [facility] plan.       Zeferino Gaytan

## 2019-11-13 NOTE — TELEPHONE ENCOUNTER
----- Message from Jorge Mensah sent at 11/13/2019  3:21 PM EST -----  Regarding: Dr. Elyse Osman first and last name:Schuyler Jara      Reason for call:office notes and doctor's signature on referral      Callback required yes/no and why:yes      Best contact number(s):[phone]      Details to clarify the request: Mr. Vijaya Lomeli requested office notes and doctor's signature on the referral  fax to [phone].       Jorge Mensah

## 2019-12-16 NOTE — TELEPHONE ENCOUNTER
Left voice mail for Chance Damico to leave detailed message and I will try to give him information he needs.

## 2019-12-16 NOTE — TELEPHONE ENCOUNTER
Please call Chance Damico, on HIPAA, needing information for [facility] where patient is currently in-patient. Thanks!

## 2019-12-17 NOTE — TELEPHONE ENCOUNTER
Spoke with Chance Damico. He reports that patient is in [address]. He will bring remote monitor there and plug in patient's bedroom.

## 2019-12-23 NOTE — ED NOTES
2100: Patient arrives to ED via stretcher with [facility] staff after experiencing a GLF in the bathroom at facility. [facility] RN states that the patients bed alarm was not in place and they believe he got up to the bathroom unaccompanied. [facility] RN states that the patient recently had a craniotomy completed and has an incision that was previously closed. She reports he has new bleeding from this incision as well as a laceration on his nose. Patient placed on the monitor x3. Resting in bed at this time. SR x 2.  
 
2200: Patient remains on monitor x3. Resting in bed. SR x2 
 
2300: Patient to CT at this time. 0000: Dr. Ibarra Climes at bedside to stitch patient's nose laceration. Patient combative and refusing further repair. Patient provided with boxed lunch and drink. Patientt resting 3560: [facility] notified of patient's discharge. [facility] arms to  patient in the next 10-15 minutes. 0100: reviewed discharge instructions with the patient. The patient verbalized understanding. Report given to Rossy Rodriguez RN ([facility]). Patient placed on their stretcher and taken back to facility.

## 2019-12-23 NOTE — DISCHARGE INSTRUCTIONS
Scan showed evidence of a very small subdural which is likely related to the prior brain surgery. I talked to a neurosurgeon who states that the patient should follow-up in the office where they can obtain the outside records to compare. Patient would not cooperate for laceration to the nasal bridge, Steri-Strips can be continued for wound care.
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
## 2019-12-24 NOTE — PROGRESS NOTES
Called to offer my condolences on the death of her mother, Noris Moss. Chief Complaint Patient presents with  
[facility] Follow Up Was in Hospital for CVA 1. Have you been to the ER, urgent care clinic since your last visit? Hospitalized since your last visit? yes 2. Have you seen or consulted any other health care providers outside of the [address] [place] [address] since your last visit? Include any pap smears or colon screening. yes

## 2020-01-01 ENCOUNTER — HOSPITAL ENCOUNTER (EMERGENCY)
Age: 85
End: 2020-01-30
Attending: EMERGENCY MEDICINE
Payer: MEDICARE

## 2020-01-01 ENCOUNTER — HOSPITAL ENCOUNTER (INPATIENT)
Age: 85
LOS: 4 days | Discharge: NURSING, REHAB OR LONG-TERM CARE | DRG: 193 | End: 2020-01-24
Attending: EMERGENCY MEDICINE | Admitting: INTERNAL MEDICINE
Payer: MEDICARE

## 2020-01-01 ENCOUNTER — PATIENT OUTREACH (OUTPATIENT)
Dept: CARDIOLOGY CLINIC | Age: 85
End: 2020-01-01

## 2020-01-01 ENCOUNTER — APPOINTMENT (OUTPATIENT)
Dept: CT IMAGING | Age: 85
DRG: 193 | End: 2020-01-01
Attending: NURSE PRACTITIONER
Payer: MEDICARE

## 2020-01-01 ENCOUNTER — APPOINTMENT (OUTPATIENT)
Dept: CT IMAGING | Age: 85
DRG: 193 | End: 2020-01-01
Attending: EMERGENCY MEDICINE
Payer: MEDICARE

## 2020-01-01 ENCOUNTER — APPOINTMENT (OUTPATIENT)
Dept: INTERVENTIONAL RADIOLOGY/VASCULAR | Age: 85
End: 2020-01-01
Attending: PHYSICAL MEDICINE & REHABILITATION

## 2020-01-01 ENCOUNTER — APPOINTMENT (OUTPATIENT)
Dept: GENERAL RADIOLOGY | Age: 85
DRG: 193 | End: 2020-01-01
Attending: NURSE PRACTITIONER
Payer: MEDICARE

## 2020-01-01 ENCOUNTER — APPOINTMENT (OUTPATIENT)
Dept: GENERAL RADIOLOGY | Age: 85
DRG: 193 | End: 2020-01-01
Attending: EMERGENCY MEDICINE
Payer: MEDICARE

## 2020-01-01 ENCOUNTER — TELEPHONE (OUTPATIENT)
Dept: CARDIOLOGY CLINIC | Age: 85
End: 2020-01-01

## 2020-01-01 VITALS
WEIGHT: 174.5 LBS | SYSTOLIC BLOOD PRESSURE: 142 MMHG | TEMPERATURE: 97.8 F | HEIGHT: 70 IN | BODY MASS INDEX: 24.98 KG/M2 | OXYGEN SATURATION: 99 % | RESPIRATION RATE: 18 BRPM | DIASTOLIC BLOOD PRESSURE: 48 MMHG | HEART RATE: 61 BPM

## 2020-01-01 VITALS
HEART RATE: 86 BPM | DIASTOLIC BLOOD PRESSURE: 68 MMHG | OXYGEN SATURATION: 94 % | TEMPERATURE: 98 F | SYSTOLIC BLOOD PRESSURE: 138 MMHG | RESPIRATION RATE: 18 BRPM

## 2020-01-01 DIAGNOSIS — I46.9 ASYSTOLE (HCC): ICD-10-CM

## 2020-01-01 DIAGNOSIS — J96.01 ACUTE RESPIRATORY FAILURE WITH HYPOXIA AND HYPERCAPNIA (HCC): Primary | ICD-10-CM

## 2020-01-01 DIAGNOSIS — J96.90 RESPIRATORY FAILURE, UNSPECIFIED CHRONICITY, UNSPECIFIED WHETHER WITH HYPOXIA OR HYPERCAPNIA (HCC): ICD-10-CM

## 2020-01-01 DIAGNOSIS — J96.02 ACUTE RESPIRATORY FAILURE WITH HYPOXIA AND HYPERCAPNIA (HCC): Primary | ICD-10-CM

## 2020-01-01 DIAGNOSIS — N17.9 ACUTE RENAL FAILURE, UNSPECIFIED ACUTE RENAL FAILURE TYPE (HCC): ICD-10-CM

## 2020-01-01 DIAGNOSIS — J18.9 PNEUMONIA OF BOTH LOWER LOBES DUE TO INFECTIOUS ORGANISM: ICD-10-CM

## 2020-01-01 DIAGNOSIS — I46.9 CARDIAC ARREST (HCC): Primary | ICD-10-CM

## 2020-01-01 LAB
ALBUMIN SERPL-MCNC: 1.9 G/DL (ref 3.5–5)
ALBUMIN SERPL-MCNC: 2 G/DL (ref 3.5–5)
ALBUMIN SERPL-MCNC: 2.2 G/DL (ref 3.5–5)
ALBUMIN SERPL-MCNC: 2.4 G/DL (ref 3.5–5)
ALBUMIN/GLOB SERPL: 0.5 {RATIO} (ref 1.1–2.2)
ALBUMIN/GLOB SERPL: 0.5 {RATIO} (ref 1.1–2.2)
ALBUMIN/GLOB SERPL: 0.6 {RATIO} (ref 1.1–2.2)
ALBUMIN/GLOB SERPL: 0.6 {RATIO} (ref 1.1–2.2)
ALP SERPL-CCNC: 126 U/L (ref 45–117)
ALP SERPL-CCNC: 191 U/L (ref 45–117)
ALP SERPL-CCNC: 209 U/L (ref 45–117)
ALP SERPL-CCNC: 249 U/L (ref 45–117)
ALT SERPL-CCNC: 10 U/L (ref 12–78)
ALT SERPL-CCNC: 16 U/L (ref 12–78)
ALT SERPL-CCNC: 20 U/L (ref 12–78)
ALT SERPL-CCNC: 22 U/L (ref 12–78)
ANION GAP SERPL CALC-SCNC: 2 MMOL/L (ref 5–15)
ANION GAP SERPL CALC-SCNC: 3 MMOL/L (ref 5–15)
ANION GAP SERPL CALC-SCNC: 4 MMOL/L (ref 5–15)
ANION GAP SERPL CALC-SCNC: 4 MMOL/L (ref 5–15)
ANION GAP SERPL CALC-SCNC: 5 MMOL/L (ref 5–15)
ANION GAP SERPL CALC-SCNC: 6 MMOL/L (ref 5–15)
ANION GAP SERPL CALC-SCNC: 7 MMOL/L (ref 5–15)
ANION GAP SERPL CALC-SCNC: 7 MMOL/L (ref 5–15)
ARTERIAL PATENCY WRIST A: YES
AST SERPL-CCNC: 12 U/L (ref 15–37)
AST SERPL-CCNC: 15 U/L (ref 15–37)
AST SERPL-CCNC: 19 U/L (ref 15–37)
AST SERPL-CCNC: 9 U/L (ref 15–37)
ATRIAL RATE: 81 BPM
BACTERIA SPEC CULT: ABNORMAL
BACTERIA SPEC CULT: NORMAL
BACTERIA SPEC CULT: NORMAL
BASE EXCESS BLD CALC-SCNC: 6 MMOL/L
BASOPHILS # BLD: 0 K/UL (ref 0–0.1)
BASOPHILS # BLD: 0.1 K/UL (ref 0–0.1)
BASOPHILS NFR BLD: 0 % (ref 0–1)
BASOPHILS NFR BLD: 1 % (ref 0–1)
BDY SITE: ABNORMAL
BILIRUB DIRECT SERPL-MCNC: 0.2 MG/DL (ref 0–0.2)
BILIRUB SERPL-MCNC: 0.3 MG/DL (ref 0.2–1)
BILIRUB SERPL-MCNC: 0.5 MG/DL (ref 0.2–1)
BILIRUB SERPL-MCNC: 0.6 MG/DL (ref 0.2–1)
BILIRUB SERPL-MCNC: 1.2 MG/DL (ref 0.2–1)
BNP SERPL-MCNC: 340 PG/ML
BUN SERPL-MCNC: 21 MG/DL (ref 6–20)
BUN SERPL-MCNC: 27 MG/DL (ref 6–20)
BUN SERPL-MCNC: 31 MG/DL (ref 6–20)
BUN SERPL-MCNC: 43 MG/DL (ref 6–20)
BUN SERPL-MCNC: 43 MG/DL (ref 6–20)
BUN SERPL-MCNC: 46 MG/DL (ref 6–20)
BUN SERPL-MCNC: 53 MG/DL (ref 6–20)
BUN SERPL-MCNC: 65 MG/DL (ref 6–20)
BUN/CREAT SERPL: 17 (ref 12–20)
BUN/CREAT SERPL: 22 (ref 12–20)
BUN/CREAT SERPL: 22 (ref 12–20)
BUN/CREAT SERPL: 25 (ref 12–20)
BUN/CREAT SERPL: 26 (ref 12–20)
BUN/CREAT SERPL: 26 (ref 12–20)
BUN/CREAT SERPL: 28 (ref 12–20)
BUN/CREAT SERPL: 32 (ref 12–20)
CA-I BLD-SCNC: 1.18 MMOL/L (ref 1.12–1.32)
CALCIUM SERPL-MCNC: 8.1 MG/DL (ref 8.5–10.1)
CALCIUM SERPL-MCNC: 8.2 MG/DL (ref 8.5–10.1)
CALCIUM SERPL-MCNC: 8.3 MG/DL (ref 8.5–10.1)
CALCIUM SERPL-MCNC: 8.5 MG/DL (ref 8.5–10.1)
CALCIUM SERPL-MCNC: 8.7 MG/DL (ref 8.5–10.1)
CALCIUM SERPL-MCNC: 8.8 MG/DL (ref 8.5–10.1)
CALCIUM SERPL-MCNC: 8.9 MG/DL (ref 8.5–10.1)
CALCIUM SERPL-MCNC: 8.9 MG/DL (ref 8.5–10.1)
CALCULATED P AXIS, ECG09: 51 DEGREES
CALCULATED R AXIS, ECG10: 28 DEGREES
CALCULATED T AXIS, ECG11: 51 DEGREES
CC UR VC: NORMAL
CHLORIDE SERPL-SCNC: 101 MMOL/L (ref 97–108)
CHLORIDE SERPL-SCNC: 102 MMOL/L (ref 97–108)
CHLORIDE SERPL-SCNC: 104 MMOL/L (ref 97–108)
CHLORIDE SERPL-SCNC: 106 MMOL/L (ref 97–108)
CHLORIDE SERPL-SCNC: 107 MMOL/L (ref 97–108)
CHLORIDE SERPL-SCNC: 108 MMOL/L (ref 97–108)
CK SERPL-CCNC: 22 U/L (ref 39–308)
CO2 SERPL-SCNC: 27 MMOL/L (ref 21–32)
CO2 SERPL-SCNC: 28 MMOL/L (ref 21–32)
CO2 SERPL-SCNC: 30 MMOL/L (ref 21–32)
CO2 SERPL-SCNC: 32 MMOL/L (ref 21–32)
CREAT SERPL-MCNC: 1.17 MG/DL (ref 0.7–1.3)
CREAT SERPL-MCNC: 1.23 MG/DL (ref 0.7–1.3)
CREAT SERPL-MCNC: 1.24 MG/DL (ref 0.7–1.3)
CREAT SERPL-MCNC: 1.34 MG/DL (ref 0.7–1.3)
CREAT SERPL-MCNC: 1.52 MG/DL (ref 0.7–1.3)
CREAT SERPL-MCNC: 1.77 MG/DL (ref 0.7–1.3)
CREAT SERPL-MCNC: 2.08 MG/DL (ref 0.7–1.3)
CREAT SERPL-MCNC: 2.97 MG/DL (ref 0.7–1.3)
CRP SERPL-MCNC: 6.75 MG/DL (ref 0–0.6)
DATE LAST DOSE: ABNORMAL
DIAGNOSIS, 93000: NORMAL
DIFFERENTIAL METHOD BLD: ABNORMAL
EOSINOPHIL # BLD: 0 K/UL (ref 0–0.4)
EOSINOPHIL # BLD: 0.3 K/UL (ref 0–0.4)
EOSINOPHIL # BLD: 0.3 K/UL (ref 0–0.4)
EOSINOPHIL # BLD: 0.4 K/UL (ref 0–0.4)
EOSINOPHIL # BLD: 0.5 K/UL (ref 0–0.4)
EOSINOPHIL NFR BLD: 0 % (ref 0–7)
EOSINOPHIL NFR BLD: 3 % (ref 0–7)
EOSINOPHIL NFR BLD: 4 % (ref 0–7)
EOSINOPHIL NFR BLD: 5 % (ref 0–7)
EOSINOPHIL NFR BLD: 6 % (ref 0–7)
ERYTHROCYTE [DISTWIDTH] IN BLOOD BY AUTOMATED COUNT: 13.9 % (ref 11.5–14.5)
ERYTHROCYTE [DISTWIDTH] IN BLOOD BY AUTOMATED COUNT: 14 % (ref 11.5–14.5)
ERYTHROCYTE [DISTWIDTH] IN BLOOD BY AUTOMATED COUNT: 14 % (ref 11.5–14.5)
ERYTHROCYTE [DISTWIDTH] IN BLOOD BY AUTOMATED COUNT: 14.1 % (ref 11.5–14.5)
ERYTHROCYTE [DISTWIDTH] IN BLOOD BY AUTOMATED COUNT: 14.2 % (ref 11.5–14.5)
ERYTHROCYTE [SEDIMENTATION RATE] IN BLOOD: 129 MM/HR (ref 0–20)
EST. AVERAGE GLUCOSE BLD GHB EST-MCNC: 154 MG/DL
EST. AVERAGE GLUCOSE BLD GHB EST-MCNC: 166 MG/DL
FERRITIN SERPL-MCNC: 60 NG/ML (ref 26–388)
FOLATE SERPL-MCNC: 6.9 NG/ML (ref 5–21)
GAS FLOW.O2 O2 DELIVERY SYS: ABNORMAL L/MIN
GAS FLOW.O2 SETTING OXYMISER: 15 L/M
GLOBULIN SER CALC-MCNC: 3.6 G/DL (ref 2–4)
GLOBULIN SER CALC-MCNC: 3.8 G/DL (ref 2–4)
GLOBULIN SER CALC-MCNC: 4 G/DL (ref 2–4)
GLOBULIN SER CALC-MCNC: 4.2 G/DL (ref 2–4)
GLUCOSE BLD STRIP.AUTO-MCNC: 105 MG/DL (ref 65–100)
GLUCOSE BLD STRIP.AUTO-MCNC: 115 MG/DL (ref 65–100)
GLUCOSE BLD STRIP.AUTO-MCNC: 138 MG/DL (ref 65–100)
GLUCOSE BLD STRIP.AUTO-MCNC: 146 MG/DL (ref 65–100)
GLUCOSE BLD STRIP.AUTO-MCNC: 167 MG/DL (ref 65–100)
GLUCOSE BLD STRIP.AUTO-MCNC: 194 MG/DL (ref 65–100)
GLUCOSE BLD STRIP.AUTO-MCNC: 213 MG/DL (ref 65–100)
GLUCOSE BLD STRIP.AUTO-MCNC: 244 MG/DL (ref 65–100)
GLUCOSE BLD STRIP.AUTO-MCNC: 256 MG/DL (ref 65–100)
GLUCOSE BLD STRIP.AUTO-MCNC: 260 MG/DL (ref 65–100)
GLUCOSE BLD STRIP.AUTO-MCNC: 266 MG/DL (ref 65–100)
GLUCOSE BLD STRIP.AUTO-MCNC: 278 MG/DL (ref 65–100)
GLUCOSE BLD STRIP.AUTO-MCNC: 279 MG/DL (ref 65–100)
GLUCOSE BLD STRIP.AUTO-MCNC: 296 MG/DL (ref 65–100)
GLUCOSE BLD STRIP.AUTO-MCNC: 303 MG/DL (ref 65–100)
GLUCOSE BLD STRIP.AUTO-MCNC: 45 MG/DL (ref 65–100)
GLUCOSE SERPL-MCNC: 138 MG/DL (ref 65–100)
GLUCOSE SERPL-MCNC: 148 MG/DL (ref 65–100)
GLUCOSE SERPL-MCNC: 183 MG/DL (ref 65–100)
GLUCOSE SERPL-MCNC: 187 MG/DL (ref 65–100)
GLUCOSE SERPL-MCNC: 194 MG/DL (ref 65–100)
GLUCOSE SERPL-MCNC: 218 MG/DL (ref 65–100)
GLUCOSE SERPL-MCNC: 261 MG/DL (ref 65–100)
GLUCOSE SERPL-MCNC: 92 MG/DL (ref 65–100)
GRAM STN SPEC: ABNORMAL
HBA1C MFR BLD: 7 % (ref 4–5.6)
HBA1C MFR BLD: 7.4 % (ref 4–5.6)
HCO3 BLD-SCNC: 31.6 MMOL/L (ref 22–26)
HCT VFR BLD AUTO: 24.6 % (ref 36.6–50.3)
HCT VFR BLD AUTO: 25.5 % (ref 36.6–50.3)
HCT VFR BLD AUTO: 26.3 % (ref 36.6–50.3)
HCT VFR BLD AUTO: 26.6 % (ref 36.6–50.3)
HCT VFR BLD AUTO: 28.2 % (ref 36.6–50.3)
HCT VFR BLD AUTO: 28.6 % (ref 36.6–50.3)
HCT VFR BLD AUTO: 28.9 % (ref 36.6–50.3)
HCT VFR BLD AUTO: 29.2 % (ref 36.6–50.3)
HGB BLD-MCNC: 7.8 G/DL (ref 12.1–17)
HGB BLD-MCNC: 7.9 G/DL (ref 12.1–17)
HGB BLD-MCNC: 7.9 G/DL (ref 12.1–17)
HGB BLD-MCNC: 8.5 G/DL (ref 12.1–17)
HGB BLD-MCNC: 8.8 G/DL (ref 12.1–17)
HGB BLD-MCNC: 8.9 G/DL (ref 12.1–17)
HGB BLD-MCNC: 9 G/DL (ref 12.1–17)
HGB BLD-MCNC: 9.1 G/DL (ref 12.1–17)
IMM GRANULOCYTES # BLD AUTO: 0 K/UL (ref 0–0.04)
IMM GRANULOCYTES # BLD AUTO: 0.1 K/UL (ref 0–0.04)
IMM GRANULOCYTES NFR BLD AUTO: 0 % (ref 0–0.5)
IMM GRANULOCYTES NFR BLD AUTO: 1 % (ref 0–0.5)
INR PPP: 1 (ref 0.9–1.1)
INR PPP: 1.1 (ref 0.9–1.1)
INR PPP: 1.1 (ref 0.9–1.1)
IRON SATN MFR SERPL: 12 % (ref 20–50)
IRON SERPL-MCNC: 24 UG/DL (ref 35–150)
LACTATE SERPL-SCNC: 1.2 MMOL/L (ref 0.4–2)
LYMPHOCYTES # BLD: 0.6 K/UL (ref 0.8–3.5)
LYMPHOCYTES # BLD: 0.7 K/UL (ref 0.8–3.5)
LYMPHOCYTES # BLD: 0.8 K/UL (ref 0.8–3.5)
LYMPHOCYTES # BLD: 0.8 K/UL (ref 0.8–3.5)
LYMPHOCYTES # BLD: 1 K/UL (ref 0.8–3.5)
LYMPHOCYTES # BLD: 1 K/UL (ref 0.8–3.5)
LYMPHOCYTES # BLD: 1.1 K/UL (ref 0.8–3.5)
LYMPHOCYTES NFR BLD: 10 % (ref 12–49)
LYMPHOCYTES NFR BLD: 12 % (ref 12–49)
LYMPHOCYTES NFR BLD: 13 % (ref 12–49)
LYMPHOCYTES NFR BLD: 15 % (ref 12–49)
LYMPHOCYTES NFR BLD: 5 % (ref 12–49)
LYMPHOCYTES NFR BLD: 6 % (ref 12–49)
LYMPHOCYTES NFR BLD: 8 % (ref 12–49)
MAGNESIUM SERPL-MCNC: 2 MG/DL (ref 1.6–2.4)
MAGNESIUM SERPL-MCNC: 2.1 MG/DL (ref 1.6–2.4)
MCH RBC QN AUTO: 28.9 PG (ref 26–34)
MCH RBC QN AUTO: 29.5 PG (ref 26–34)
MCH RBC QN AUTO: 29.6 PG (ref 26–34)
MCH RBC QN AUTO: 29.7 PG (ref 26–34)
MCH RBC QN AUTO: 29.8 PG (ref 26–34)
MCH RBC QN AUTO: 29.9 PG (ref 26–34)
MCH RBC QN AUTO: 30.1 PG (ref 26–34)
MCH RBC QN AUTO: 30.7 PG (ref 26–34)
MCHC RBC AUTO-ENTMCNC: 29.7 G/DL (ref 30–36.5)
MCHC RBC AUTO-ENTMCNC: 30.4 G/DL (ref 30–36.5)
MCHC RBC AUTO-ENTMCNC: 31 G/DL (ref 30–36.5)
MCHC RBC AUTO-ENTMCNC: 31.1 G/DL (ref 30–36.5)
MCHC RBC AUTO-ENTMCNC: 31.2 G/DL (ref 30–36.5)
MCHC RBC AUTO-ENTMCNC: 31.7 G/DL (ref 30–36.5)
MCHC RBC AUTO-ENTMCNC: 31.9 G/DL (ref 30–36.5)
MCHC RBC AUTO-ENTMCNC: 32.3 G/DL (ref 30–36.5)
MCV RBC AUTO: 92 FL (ref 80–99)
MCV RBC AUTO: 92.5 FL (ref 80–99)
MCV RBC AUTO: 95.7 FL (ref 80–99)
MCV RBC AUTO: 96.6 FL (ref 80–99)
MCV RBC AUTO: 96.7 FL (ref 80–99)
MCV RBC AUTO: 96.9 FL (ref 80–99)
MCV RBC AUTO: 97.3 FL (ref 80–99)
MCV RBC AUTO: 97.4 FL (ref 80–99)
MONOCYTES # BLD: 0.6 K/UL (ref 0–1)
MONOCYTES # BLD: 0.7 K/UL (ref 0–1)
MONOCYTES # BLD: 0.9 K/UL (ref 0–1)
MONOCYTES # BLD: 1.1 K/UL (ref 0–1)
MONOCYTES NFR BLD: 10 % (ref 5–13)
MONOCYTES NFR BLD: 10 % (ref 5–13)
MONOCYTES NFR BLD: 11 % (ref 5–13)
MONOCYTES NFR BLD: 5 % (ref 5–13)
MONOCYTES NFR BLD: 7 % (ref 5–13)
MONOCYTES NFR BLD: 8 % (ref 5–13)
MONOCYTES NFR BLD: 9 % (ref 5–13)
NEUTS SEG # BLD: 11.7 K/UL (ref 1.8–8)
NEUTS SEG # BLD: 5.2 K/UL (ref 1.8–8)
NEUTS SEG # BLD: 5.6 K/UL (ref 1.8–8)
NEUTS SEG # BLD: 5.8 K/UL (ref 1.8–8)
NEUTS SEG # BLD: 6.2 K/UL (ref 1.8–8)
NEUTS SEG # BLD: 8.3 K/UL (ref 1.8–8)
NEUTS SEG # BLD: 9 K/UL (ref 1.8–8)
NEUTS SEG NFR BLD: 71 % (ref 32–75)
NEUTS SEG NFR BLD: 72 % (ref 32–75)
NEUTS SEG NFR BLD: 73 % (ref 32–75)
NEUTS SEG NFR BLD: 73 % (ref 32–75)
NEUTS SEG NFR BLD: 78 % (ref 32–75)
NEUTS SEG NFR BLD: 81 % (ref 32–75)
NEUTS SEG NFR BLD: 89 % (ref 32–75)
NRBC # BLD: 0 K/UL (ref 0–0.01)
NRBC BLD-RTO: 0 PER 100 WBC
O2/TOTAL GAS SETTING VFR VENT: 100 %
P-R INTERVAL, ECG05: 224 MS
PCO2 BLD: 58.5 MMHG (ref 35–45)
PH BLD: 7.34 [PH] (ref 7.35–7.45)
PLATELET # BLD AUTO: 264 K/UL (ref 150–400)
PLATELET # BLD AUTO: 280 K/UL (ref 150–400)
PLATELET # BLD AUTO: 285 K/UL (ref 150–400)
PLATELET # BLD AUTO: 297 K/UL (ref 150–400)
PLATELET # BLD AUTO: 301 K/UL (ref 150–400)
PLATELET # BLD AUTO: 303 K/UL (ref 150–400)
PLATELET # BLD AUTO: 317 K/UL (ref 150–400)
PLATELET # BLD AUTO: 365 K/UL (ref 150–400)
PLATELET COMMENTS,PCOM: ABNORMAL
PMV BLD AUTO: 9.2 FL (ref 8.9–12.9)
PMV BLD AUTO: 9.2 FL (ref 8.9–12.9)
PMV BLD AUTO: 9.3 FL (ref 8.9–12.9)
PMV BLD AUTO: 9.3 FL (ref 8.9–12.9)
PMV BLD AUTO: 9.4 FL (ref 8.9–12.9)
PMV BLD AUTO: 9.4 FL (ref 8.9–12.9)
PMV BLD AUTO: 9.5 FL (ref 8.9–12.9)
PMV BLD AUTO: 9.6 FL (ref 8.9–12.9)
PO2 BLD: 118 MMHG (ref 80–100)
POTASSIUM SERPL-SCNC: 3.8 MMOL/L (ref 3.5–5.1)
POTASSIUM SERPL-SCNC: 3.9 MMOL/L (ref 3.5–5.1)
POTASSIUM SERPL-SCNC: 4.5 MMOL/L (ref 3.5–5.1)
POTASSIUM SERPL-SCNC: 4.6 MMOL/L (ref 3.5–5.1)
POTASSIUM SERPL-SCNC: 4.6 MMOL/L (ref 3.5–5.1)
POTASSIUM SERPL-SCNC: 4.7 MMOL/L (ref 3.5–5.1)
POTASSIUM SERPL-SCNC: 4.7 MMOL/L (ref 3.5–5.1)
POTASSIUM SERPL-SCNC: 5.1 MMOL/L (ref 3.5–5.1)
PROT SERPL-MCNC: 5.6 G/DL (ref 6.4–8.2)
PROT SERPL-MCNC: 5.7 G/DL (ref 6.4–8.2)
PROT SERPL-MCNC: 6.4 G/DL (ref 6.4–8.2)
PROT SERPL-MCNC: 6.4 G/DL (ref 6.4–8.2)
PROTHROMBIN TIME: 10.3 SEC (ref 9–11.1)
PROTHROMBIN TIME: 11 SEC (ref 9–11.1)
PROTHROMBIN TIME: 11.4 SEC (ref 9–11.1)
Q-T INTERVAL, ECG07: 376 MS
QRS DURATION, ECG06: 86 MS
QTC CALCULATION (BEZET), ECG08: 436 MS
RBC # BLD AUTO: 2.54 M/UL (ref 4.1–5.7)
RBC # BLD AUTO: 2.64 M/UL (ref 4.1–5.7)
RBC # BLD AUTO: 2.73 M/UL (ref 4.1–5.7)
RBC # BLD AUTO: 2.86 M/UL (ref 4.1–5.7)
RBC # BLD AUTO: 2.97 M/UL (ref 4.1–5.7)
RBC # BLD AUTO: 2.99 M/UL (ref 4.1–5.7)
RBC # BLD AUTO: 3.02 M/UL (ref 4.1–5.7)
RBC # BLD AUTO: 3.05 M/UL (ref 4.1–5.7)
RBC MORPH BLD: ABNORMAL
REPORTED DOSE,DOSE: ABNORMAL UNITS
REPORTED DOSE/TIME,TMG: ABNORMAL
SAO2 % BLD: 98 % (ref 92–97)
SERVICE CMNT-IMP: ABNORMAL
SERVICE CMNT-IMP: NORMAL
SERVICE CMNT-IMP: NORMAL
SODIUM SERPL-SCNC: 136 MMOL/L (ref 136–145)
SODIUM SERPL-SCNC: 137 MMOL/L (ref 136–145)
SODIUM SERPL-SCNC: 138 MMOL/L (ref 136–145)
SODIUM SERPL-SCNC: 138 MMOL/L (ref 136–145)
SODIUM SERPL-SCNC: 139 MMOL/L (ref 136–145)
SODIUM SERPL-SCNC: 143 MMOL/L (ref 136–145)
SPECIMEN TYPE: ABNORMAL
TIBC SERPL-MCNC: 205 UG/DL (ref 250–450)
TOTAL RESP. RATE, ITRR: 19
TROPONIN I SERPL-MCNC: <0.05 NG/ML
TSH SERPL DL<=0.05 MIU/L-ACNC: 6.21 UIU/ML (ref 0.36–3.74)
VANCOMYCIN TROUGH SERPL-MCNC: 18.3 UG/ML (ref 5–10)
VENTRICULAR RATE, ECG03: 81 BPM
VIT B12 SERPL-MCNC: 339 PG/ML (ref 193–986)
WBC # BLD AUTO: 10.6 K/UL (ref 4.1–11.1)
WBC # BLD AUTO: 11 K/UL (ref 4.1–11.1)
WBC # BLD AUTO: 13.2 K/UL (ref 4.1–11.1)
WBC # BLD AUTO: 7.4 K/UL (ref 4.1–11.1)
WBC # BLD AUTO: 7.7 K/UL (ref 4.1–11.1)
WBC # BLD AUTO: 8 K/UL (ref 4.1–11.1)
WBC # BLD AUTO: 8.5 K/UL (ref 4.1–11.1)
WBC # BLD AUTO: 8.8 K/UL (ref 4.1–11.1)
WBC MORPH BLD: ABNORMAL

## 2020-01-01 PROCEDURE — 77030037877 HC DRSG MEPILEX >48IN BORD MOLN -A

## 2020-01-01 PROCEDURE — 74011000258 HC RX REV CODE- 258: Performed by: EMERGENCY MEDICINE

## 2020-01-01 PROCEDURE — 74011000258 HC RX REV CODE- 258: Performed by: NURSE PRACTITIONER

## 2020-01-01 PROCEDURE — 71250 CT THORAX DX C-: CPT

## 2020-01-01 PROCEDURE — 85025 COMPLETE CBC W/AUTO DIFF WBC: CPT

## 2020-01-01 PROCEDURE — 74011636637 HC RX REV CODE- 636/637: Performed by: INTERNAL MEDICINE

## 2020-01-01 PROCEDURE — 65660000000 HC RM CCU STEPDOWN

## 2020-01-01 PROCEDURE — 82550 ASSAY OF CK (CPK): CPT

## 2020-01-01 PROCEDURE — 83735 ASSAY OF MAGNESIUM: CPT

## 2020-01-01 PROCEDURE — 97535 SELF CARE MNGMENT TRAINING: CPT | Performed by: OCCUPATIONAL THERAPIST

## 2020-01-01 PROCEDURE — 80048 BASIC METABOLIC PNL TOTAL CA: CPT

## 2020-01-01 PROCEDURE — 36600 WITHDRAWAL OF ARTERIAL BLOOD: CPT

## 2020-01-01 PROCEDURE — 74011250636 HC RX REV CODE- 250/636: Performed by: INTERNAL MEDICINE

## 2020-01-01 PROCEDURE — 83880 ASSAY OF NATRIURETIC PEPTIDE: CPT

## 2020-01-01 PROCEDURE — 80053 COMPREHEN METABOLIC PANEL: CPT

## 2020-01-01 PROCEDURE — 74011250637 HC RX REV CODE- 250/637: Performed by: NURSE PRACTITIONER

## 2020-01-01 PROCEDURE — 97530 THERAPEUTIC ACTIVITIES: CPT | Performed by: PHYSICAL THERAPIST

## 2020-01-01 PROCEDURE — 80076 HEPATIC FUNCTION PANEL: CPT

## 2020-01-01 PROCEDURE — 74011000258 HC RX REV CODE- 258: Performed by: INTERNAL MEDICINE

## 2020-01-01 PROCEDURE — 83540 ASSAY OF IRON: CPT

## 2020-01-01 PROCEDURE — 70450 CT HEAD/BRAIN W/O DYE: CPT

## 2020-01-01 PROCEDURE — 36415 COLL VENOUS BLD VENIPUNCTURE: CPT

## 2020-01-01 PROCEDURE — 83605 ASSAY OF LACTIC ACID: CPT

## 2020-01-01 PROCEDURE — 82803 BLOOD GASES ANY COMBINATION: CPT

## 2020-01-01 PROCEDURE — 74011000250 HC RX REV CODE- 250

## 2020-01-01 PROCEDURE — 77010033678 HC OXYGEN DAILY

## 2020-01-01 PROCEDURE — 80202 ASSAY OF VANCOMYCIN: CPT

## 2020-01-01 PROCEDURE — 94761 N-INVAS EAR/PLS OXIMETRY MLT: CPT

## 2020-01-01 PROCEDURE — 87077 CULTURE AEROBIC IDENTIFY: CPT

## 2020-01-01 PROCEDURE — 97167 OT EVAL HIGH COMPLEX 60 MIN: CPT | Performed by: OCCUPATIONAL THERAPIST

## 2020-01-01 PROCEDURE — 92950 HEART/LUNG RESUSCITATION CPR: CPT

## 2020-01-01 PROCEDURE — 74011250637 HC RX REV CODE- 250/637: Performed by: INTERNAL MEDICINE

## 2020-01-01 PROCEDURE — 85610 PROTHROMBIN TIME: CPT

## 2020-01-01 PROCEDURE — 74176 CT ABD & PELVIS W/O CONTRAST: CPT

## 2020-01-01 PROCEDURE — 82607 VITAMIN B-12: CPT

## 2020-01-01 PROCEDURE — 87186 SC STD MICRODIL/AGAR DIL: CPT

## 2020-01-01 PROCEDURE — 93005 ELECTROCARDIOGRAM TRACING: CPT

## 2020-01-01 PROCEDURE — C1751 CATH, INF, PER/CENT/MIDLINE: HCPCS

## 2020-01-01 PROCEDURE — 73080 X-RAY EXAM OF ELBOW: CPT

## 2020-01-01 PROCEDURE — 87040 BLOOD CULTURE FOR BACTERIA: CPT

## 2020-01-01 PROCEDURE — 85652 RBC SED RATE AUTOMATED: CPT

## 2020-01-01 PROCEDURE — 82746 ASSAY OF FOLIC ACID SERUM: CPT

## 2020-01-01 PROCEDURE — 82962 GLUCOSE BLOOD TEST: CPT

## 2020-01-01 PROCEDURE — 83036 HEMOGLOBIN GLYCOSYLATED A1C: CPT

## 2020-01-01 PROCEDURE — 97530 THERAPEUTIC ACTIVITIES: CPT

## 2020-01-01 PROCEDURE — 97116 GAIT TRAINING THERAPY: CPT | Performed by: PHYSICAL THERAPIST

## 2020-01-01 PROCEDURE — 84484 ASSAY OF TROPONIN QUANT: CPT

## 2020-01-01 PROCEDURE — 97162 PT EVAL MOD COMPLEX 30 MIN: CPT | Performed by: PHYSICAL THERAPIST

## 2020-01-01 PROCEDURE — 71045 X-RAY EXAM CHEST 1 VIEW: CPT

## 2020-01-01 PROCEDURE — 84443 ASSAY THYROID STIM HORMONE: CPT

## 2020-01-01 PROCEDURE — 82728 ASSAY OF FERRITIN: CPT

## 2020-01-01 PROCEDURE — 92610 EVALUATE SWALLOWING FUNCTION: CPT | Performed by: SPEECH-LANGUAGE PATHOLOGIST

## 2020-01-01 PROCEDURE — 36573 INSJ PICC RS&I 5 YR+: CPT

## 2020-01-01 PROCEDURE — 77030029488 HC ELECTRD PAD DEFIB ZOLL -B

## 2020-01-01 PROCEDURE — 99283 EMERGENCY DEPT VISIT LOW MDM: CPT

## 2020-01-01 PROCEDURE — 85027 COMPLETE CBC AUTOMATED: CPT

## 2020-01-01 PROCEDURE — 99285 EMERGENCY DEPT VISIT HI MDM: CPT

## 2020-01-01 PROCEDURE — 87205 SMEAR GRAM STAIN: CPT

## 2020-01-01 PROCEDURE — 74011250636 HC RX REV CODE- 250/636: Performed by: EMERGENCY MEDICINE

## 2020-01-01 PROCEDURE — 86140 C-REACTIVE PROTEIN: CPT

## 2020-01-01 RX ORDER — DEXTROSE MONOHYDRATE 25 G/50ML
25 INJECTION, SOLUTION INTRAVENOUS ONCE
Status: COMPLETED | OUTPATIENT
Start: 2020-01-01 | End: 2020-01-01

## 2020-01-01 RX ORDER — CLINDAMYCIN HYDROCHLORIDE 300 MG/1
300 CAPSULE ORAL 4 TIMES DAILY
Qty: 28 CAP | Refills: 0 | Status: SHIPPED | OUTPATIENT
Start: 2020-01-01 | End: 2020-01-31

## 2020-01-01 RX ORDER — POLYETHYLENE GLYCOL 3350 17 G/17G
17 POWDER, FOR SOLUTION ORAL DAILY
Status: DISCONTINUED | OUTPATIENT
Start: 2020-01-01 | End: 2020-01-01 | Stop reason: HOSPADM

## 2020-01-01 RX ORDER — LANOLIN ALCOHOL/MO/W.PET/CERES
1 CREAM (GRAM) TOPICAL
Status: DISCONTINUED | OUTPATIENT
Start: 2020-01-01 | End: 2020-01-01 | Stop reason: HOSPADM

## 2020-01-01 RX ORDER — HEPARIN SODIUM 200 [USP'U]/100ML
400 INJECTION, SOLUTION INTRAVENOUS ONCE
Status: ACTIVE | OUTPATIENT
Start: 2020-01-01 | End: 2020-01-01

## 2020-01-01 RX ORDER — SODIUM CHLORIDE 0.9 % (FLUSH) 0.9 %
5-40 SYRINGE (ML) INJECTION EVERY 8 HOURS
Status: DISCONTINUED | OUTPATIENT
Start: 2020-01-01 | End: 2020-01-01 | Stop reason: HOSPADM

## 2020-01-01 RX ORDER — ONDANSETRON 2 MG/ML
4 INJECTION INTRAMUSCULAR; INTRAVENOUS
Status: DISCONTINUED | OUTPATIENT
Start: 2020-01-01 | End: 2020-01-01 | Stop reason: HOSPADM

## 2020-01-01 RX ORDER — SODIUM CHLORIDE 0.9 % (FLUSH) 0.9 %
5-40 SYRINGE (ML) INJECTION AS NEEDED
Status: DISCONTINUED | OUTPATIENT
Start: 2020-01-01 | End: 2020-01-01 | Stop reason: HOSPADM

## 2020-01-01 RX ORDER — AMLODIPINE BESYLATE 2.5 MG/1
2.5 TABLET ORAL DAILY
Status: DISCONTINUED | OUTPATIENT
Start: 2020-01-01 | End: 2020-01-01 | Stop reason: HOSPADM

## 2020-01-01 RX ORDER — INSULIN LISPRO 100 [IU]/ML
INJECTION, SOLUTION INTRAVENOUS; SUBCUTANEOUS
Status: DISCONTINUED | OUTPATIENT
Start: 2020-01-01 | End: 2020-01-01 | Stop reason: HOSPADM

## 2020-01-01 RX ORDER — MAGNESIUM SULFATE 100 %
4 CRYSTALS MISCELLANEOUS AS NEEDED
Status: DISCONTINUED | OUTPATIENT
Start: 2020-01-01 | End: 2020-01-01 | Stop reason: HOSPADM

## 2020-01-01 RX ORDER — LIDOCAINE HYDROCHLORIDE 20 MG/ML
20 INJECTION, SOLUTION INFILTRATION; PERINEURAL ONCE
Status: COMPLETED | OUTPATIENT
Start: 2020-01-01 | End: 2020-01-01

## 2020-01-01 RX ORDER — LANOLIN ALCOHOL/MO/W.PET/CERES
325 CREAM (GRAM) TOPICAL
Qty: 30 TAB | Refills: 0 | Status: SHIPPED | OUTPATIENT
Start: 2020-01-01

## 2020-01-01 RX ORDER — METOPROLOL TARTRATE 25 MG/1
12.5 TABLET, FILM COATED ORAL EVERY 12 HOURS
Status: DISCONTINUED | OUTPATIENT
Start: 2020-01-01 | End: 2020-01-01 | Stop reason: HOSPADM

## 2020-01-01 RX ORDER — ACETAMINOPHEN 325 MG/1
650 TABLET ORAL
Status: SHIPPED | COMMUNITY
Start: 2020-01-01

## 2020-01-01 RX ORDER — HEPARIN SODIUM 5000 [USP'U]/ML
5000 INJECTION, SOLUTION INTRAVENOUS; SUBCUTANEOUS EVERY 8 HOURS
Status: DISCONTINUED | OUTPATIENT
Start: 2020-01-01 | End: 2020-01-01

## 2020-01-01 RX ORDER — DEXTROSE MONOHYDRATE 25 G/50ML
25-50 INJECTION, SOLUTION INTRAVENOUS AS NEEDED
Status: DISCONTINUED | OUTPATIENT
Start: 2020-01-01 | End: 2020-01-01 | Stop reason: HOSPADM

## 2020-01-01 RX ORDER — GABAPENTIN 600 MG/1
600 TABLET ORAL
COMMUNITY

## 2020-01-01 RX ORDER — HEPARIN SODIUM 5000 [USP'U]/ML
5000 INJECTION, SOLUTION INTRAVENOUS; SUBCUTANEOUS EVERY 12 HOURS
Status: DISCONTINUED | OUTPATIENT
Start: 2020-01-01 | End: 2020-01-01

## 2020-01-01 RX ORDER — INSULIN LISPRO 100 [IU]/ML
INJECTION, SOLUTION INTRAVENOUS; SUBCUTANEOUS EVERY 6 HOURS
Status: DISCONTINUED | OUTPATIENT
Start: 2020-01-01 | End: 2020-01-01

## 2020-01-01 RX ORDER — METOPROLOL TARTRATE 25 MG/1
12.5 TABLET, FILM COATED ORAL EVERY 12 HOURS
Qty: 30 TAB | Refills: 0 | Status: SHIPPED | OUTPATIENT
Start: 2020-01-01

## 2020-01-01 RX ORDER — GUAIFENESIN 600 MG/1
600 TABLET, EXTENDED RELEASE ORAL 2 TIMES DAILY
Status: DISCONTINUED | OUTPATIENT
Start: 2020-01-01 | End: 2020-01-01 | Stop reason: HOSPADM

## 2020-01-01 RX ORDER — CITALOPRAM 20 MG/1
20 TABLET, FILM COATED ORAL DAILY
Status: DISCONTINUED | OUTPATIENT
Start: 2020-01-01 | End: 2020-01-01 | Stop reason: HOSPADM

## 2020-01-01 RX ORDER — ACETAMINOPHEN 325 MG/1
650 TABLET ORAL
Status: DISCONTINUED | OUTPATIENT
Start: 2020-01-01 | End: 2020-01-01 | Stop reason: HOSPADM

## 2020-01-01 RX ORDER — AMLODIPINE BESYLATE 2.5 MG/1
2.5 TABLET ORAL DAILY
Qty: 30 TAB | Refills: 0 | Status: SHIPPED | OUTPATIENT
Start: 2020-01-01

## 2020-01-01 RX ORDER — DOXYCYCLINE 100 MG/1
100 CAPSULE ORAL 2 TIMES DAILY
Qty: 8 CAP | Refills: 0 | Status: SHIPPED | OUTPATIENT
Start: 2020-01-01 | End: 2020-01-01

## 2020-01-01 RX ORDER — ATORVASTATIN CALCIUM 40 MG/1
80 TABLET, FILM COATED ORAL
Status: DISCONTINUED | OUTPATIENT
Start: 2020-01-01 | End: 2020-01-01 | Stop reason: HOSPADM

## 2020-01-01 RX ORDER — LIDOCAINE HYDROCHLORIDE 20 MG/ML
INJECTION, SOLUTION INFILTRATION; PERINEURAL
Status: COMPLETED
Start: 2020-01-01 | End: 2020-01-01

## 2020-01-01 RX ORDER — BISACODYL 5 MG
5 TABLET, DELAYED RELEASE (ENTERIC COATED) ORAL DAILY PRN
Status: DISCONTINUED | OUTPATIENT
Start: 2020-01-01 | End: 2020-01-01 | Stop reason: HOSPADM

## 2020-01-01 RX ORDER — SODIUM CHLORIDE 9 MG/ML
75 INJECTION, SOLUTION INTRAVENOUS CONTINUOUS
Status: DISCONTINUED | OUTPATIENT
Start: 2020-01-01 | End: 2020-01-01

## 2020-01-01 RX ADMIN — PIPERACILLIN AND TAZOBACTAM 3.38 G: 3; .375 INJECTION, POWDER, LYOPHILIZED, FOR SOLUTION INTRAVENOUS at 06:04

## 2020-01-01 RX ADMIN — GUAIFENESIN 600 MG: 600 TABLET, EXTENDED RELEASE ORAL at 17:38

## 2020-01-01 RX ADMIN — CITALOPRAM HYDROBROMIDE 20 MG: 20 TABLET ORAL at 08:58

## 2020-01-01 RX ADMIN — INSULIN LISPRO 4 UNITS: 100 INJECTION, SOLUTION INTRAVENOUS; SUBCUTANEOUS at 21:47

## 2020-01-01 RX ADMIN — METOPROLOL TARTRATE 12.5 MG: 25 TABLET ORAL at 08:58

## 2020-01-01 RX ADMIN — VANCOMYCIN HYDROCHLORIDE 1000 MG: 1 INJECTION, POWDER, LYOPHILIZED, FOR SOLUTION INTRAVENOUS at 17:32

## 2020-01-01 RX ADMIN — CITALOPRAM HYDROBROMIDE 20 MG: 20 TABLET ORAL at 10:48

## 2020-01-01 RX ADMIN — SODIUM CHLORIDE 750 MG: 900 INJECTION, SOLUTION INTRAVENOUS at 16:09

## 2020-01-01 RX ADMIN — LIDOCAINE HYDROCHLORIDE 200 MG: 20 INJECTION, SOLUTION INFILTRATION; PERINEURAL at 08:36

## 2020-01-01 RX ADMIN — INSULIN LISPRO 3 UNITS: 100 INJECTION, SOLUTION INTRAVENOUS; SUBCUTANEOUS at 09:08

## 2020-01-01 RX ADMIN — INSULIN LISPRO 5 UNITS: 100 INJECTION, SOLUTION INTRAVENOUS; SUBCUTANEOUS at 17:48

## 2020-01-01 RX ADMIN — Medication 10 ML: at 13:43

## 2020-01-01 RX ADMIN — INSULIN LISPRO 2 UNITS: 100 INJECTION, SOLUTION INTRAVENOUS; SUBCUTANEOUS at 17:30

## 2020-01-01 RX ADMIN — CEFEPIME HYDROCHLORIDE 1 G: 1 INJECTION, POWDER, FOR SOLUTION INTRAMUSCULAR; INTRAVENOUS at 11:00

## 2020-01-01 RX ADMIN — GUAIFENESIN 600 MG: 600 TABLET, EXTENDED RELEASE ORAL at 08:17

## 2020-01-01 RX ADMIN — INSULIN LISPRO 5 UNITS: 100 INJECTION, SOLUTION INTRAVENOUS; SUBCUTANEOUS at 12:03

## 2020-01-01 RX ADMIN — INSULIN LISPRO 5 UNITS: 100 INJECTION, SOLUTION INTRAVENOUS; SUBCUTANEOUS at 12:25

## 2020-01-01 RX ADMIN — GUAIFENESIN 600 MG: 600 TABLET, EXTENDED RELEASE ORAL at 18:10

## 2020-01-01 RX ADMIN — AMLODIPINE BESYLATE 2.5 MG: 2.5 TABLET ORAL at 08:17

## 2020-01-01 RX ADMIN — Medication 10 ML: at 05:05

## 2020-01-01 RX ADMIN — METOPROLOL TARTRATE 12.5 MG: 25 TABLET ORAL at 21:19

## 2020-01-01 RX ADMIN — VANCOMYCIN HYDROCHLORIDE 1000 MG: 1 INJECTION, POWDER, LYOPHILIZED, FOR SOLUTION INTRAVENOUS at 00:25

## 2020-01-01 RX ADMIN — ATORVASTATIN CALCIUM 80 MG: 40 TABLET, FILM COATED ORAL at 21:20

## 2020-01-01 RX ADMIN — Medication 10 ML: at 15:12

## 2020-01-01 RX ADMIN — Medication 10 ML: at 05:00

## 2020-01-01 RX ADMIN — INSULIN LISPRO 5 UNITS: 100 INJECTION, SOLUTION INTRAVENOUS; SUBCUTANEOUS at 09:28

## 2020-01-01 RX ADMIN — DEXTROSE MONOHYDRATE 25 G: 25 INJECTION, SOLUTION INTRAVENOUS at 18:31

## 2020-01-01 RX ADMIN — AMLODIPINE BESYLATE 2.5 MG: 2.5 TABLET ORAL at 08:58

## 2020-01-01 RX ADMIN — Medication 1 CAPSULE: at 10:49

## 2020-01-01 RX ADMIN — CEFEPIME HYDROCHLORIDE 1 G: 1 INJECTION, POWDER, FOR SOLUTION INTRAMUSCULAR; INTRAVENOUS at 09:00

## 2020-01-01 RX ADMIN — Medication 10 ML: at 21:48

## 2020-01-01 RX ADMIN — METOPROLOL TARTRATE 12.5 MG: 25 TABLET ORAL at 21:47

## 2020-01-01 RX ADMIN — CEFEPIME HYDROCHLORIDE 2 G: 2 INJECTION, POWDER, FOR SOLUTION INTRAVENOUS at 12:01

## 2020-01-01 RX ADMIN — Medication 1 CAPSULE: at 12:06

## 2020-01-01 RX ADMIN — VANCOMYCIN HYDROCHLORIDE 1750 MG: 10 INJECTION, POWDER, LYOPHILIZED, FOR SOLUTION INTRAVENOUS at 06:37

## 2020-01-01 RX ADMIN — Medication 1 CAPSULE: at 08:57

## 2020-01-01 RX ADMIN — INSULIN LISPRO 2 UNITS: 100 INJECTION, SOLUTION INTRAVENOUS; SUBCUTANEOUS at 08:59

## 2020-01-01 RX ADMIN — INSULIN LISPRO 3 UNITS: 100 INJECTION, SOLUTION INTRAVENOUS; SUBCUTANEOUS at 21:45

## 2020-01-01 RX ADMIN — METOPROLOL TARTRATE 12.5 MG: 25 TABLET ORAL at 08:17

## 2020-01-01 RX ADMIN — CITALOPRAM HYDROBROMIDE 20 MG: 20 TABLET ORAL at 08:17

## 2020-01-01 RX ADMIN — Medication 10 ML: at 21:20

## 2020-01-01 RX ADMIN — VANCOMYCIN HYDROCHLORIDE 1000 MG: 1 INJECTION, POWDER, LYOPHILIZED, FOR SOLUTION INTRAVENOUS at 08:57

## 2020-01-01 RX ADMIN — METOPROLOL TARTRATE 12.5 MG: 25 TABLET ORAL at 21:45

## 2020-01-01 RX ADMIN — ACETAMINOPHEN 650 MG: 325 TABLET ORAL at 13:41

## 2020-01-01 RX ADMIN — CEFEPIME HYDROCHLORIDE 1 G: 1 INJECTION, POWDER, FOR SOLUTION INTRAMUSCULAR; INTRAVENOUS at 10:17

## 2020-01-01 RX ADMIN — METOPROLOL TARTRATE 12.5 MG: 25 TABLET ORAL at 10:48

## 2020-01-01 RX ADMIN — Medication 10 ML: at 06:26

## 2020-01-01 RX ADMIN — INSULIN LISPRO 3 UNITS: 100 INJECTION, SOLUTION INTRAVENOUS; SUBCUTANEOUS at 21:20

## 2020-01-01 RX ADMIN — ATORVASTATIN CALCIUM 80 MG: 40 TABLET, FILM COATED ORAL at 21:47

## 2020-01-01 RX ADMIN — METOPROLOL TARTRATE 12.5 MG: 25 TABLET ORAL at 16:14

## 2020-01-01 RX ADMIN — GUAIFENESIN 600 MG: 600 TABLET, EXTENDED RELEASE ORAL at 08:57

## 2020-01-01 RX ADMIN — Medication 1 CAPSULE: at 08:17

## 2020-01-01 RX ADMIN — INSULIN LISPRO 5 UNITS: 100 INJECTION, SOLUTION INTRAVENOUS; SUBCUTANEOUS at 18:09

## 2020-01-01 RX ADMIN — Medication 10 ML: at 06:00

## 2020-01-01 RX ADMIN — SODIUM CHLORIDE 500 ML: 900 INJECTION, SOLUTION INTRAVENOUS at 02:50

## 2020-01-01 RX ADMIN — SODIUM CHLORIDE 75 ML/HR: 900 INJECTION, SOLUTION INTRAVENOUS at 06:29

## 2020-01-01 RX ADMIN — SODIUM CHLORIDE 750 MG: 900 INJECTION, SOLUTION INTRAVENOUS at 08:02

## 2020-01-01 RX ADMIN — FERROUS SULFATE TAB 325 MG (65 MG ELEMENTAL FE) 325 MG: 325 (65 FE) TAB at 08:57

## 2020-01-01 RX ADMIN — Medication 10 ML: at 22:09

## 2020-01-01 RX ADMIN — SODIUM CHLORIDE 75 ML/HR: 900 INJECTION, SOLUTION INTRAVENOUS at 20:54

## 2020-01-01 RX ADMIN — Medication 10 ML: at 17:48

## 2020-01-01 RX ADMIN — Medication 10 ML: at 06:05

## 2020-01-01 RX ADMIN — POLYETHYLENE GLYCOL 3350 17 G: 17 POWDER, FOR SOLUTION ORAL at 08:57

## 2020-01-01 RX ADMIN — CITALOPRAM HYDROBROMIDE 20 MG: 20 TABLET ORAL at 12:06

## 2020-01-01 RX ADMIN — CEFEPIME HYDROCHLORIDE 2 G: 2 INJECTION, POWDER, FOR SOLUTION INTRAVENOUS at 23:16

## 2020-01-01 RX ADMIN — Medication 10 ML: at 21:45

## 2020-01-01 RX ADMIN — ATORVASTATIN CALCIUM 80 MG: 40 TABLET, FILM COATED ORAL at 21:44

## 2020-01-01 RX ADMIN — Medication 10 ML: at 15:04

## 2020-01-02 NOTE — PROGRESS NOTES
Name of procedure: PICC Line    Complications, if any, r/t procedure: none    VS : Stable    Post Procedure Care Needed/order sets in connectcare: see orders    Pt tolerated procedure well. VSS. No C/O pain. Dressing to site D&I. No bleeding or hematoma noted to site. HOB elevated. Pt taken to [facility]. NAD noted a time of transfer. Report called to Amna Ríos who will assume care of pt.

## 2020-01-13 NOTE — TELEPHONE ENCOUNTER
Please call patient's son regarding letter received about not receiving a signal. Son states he is at [address], then going back to [facility]. Thanks

## 2020-01-20 PROBLEM — J96.00 ARF (ACUTE RESPIRATORY FAILURE) (HCC): Status: ACTIVE | Noted: 2020-01-01

## 2020-01-20 NOTE — PROGRESS NOTES
Pharmacy Clarification of the Prior to Admission Medication Regimen Retrospective to the Admission Medication Reconciliation The patient was not interviewed regarding clarification of the prior to admission medication regimen. Patient was transferred from [facility], to [address] [place] [address], with a current med list.  
 
Information Obtained From: Transfer Papers Recommendations/Findings: The following amendments were made to the patient's active medication list on file at [address] [place] [address]:  
 
1) Additions: None 2) Removals: Colie Coca Asmanex Aspirin 81 mg 
Ergocalciferol Zetia Mucinex Methocarbamol Prednisone 3) Changes: 
gabapentin (NEURONTIN)  tablet (Old regimen: (strength 300 mg) 300 mg QHS /New regimen: (strength 600 mg) 600 mg QHS) 
insulin NPH (HUMULIN N NPH U-100 INSULIN) 100 unit/mL injection (Old regimen: 27 units with breakfast and 35 units with dinner /New regimen: 30 units with breakfast and 27 units with dinner) 4) Pertinent Pharmacy Findings: 
Updated patients preferred outpatient pharmacy to: [facility] did not update the outpatient pharmacy due to the patient living at a SNF  
 
 PTA medication list was corrected to the following:  
 
Prior to Admission Medications Prescriptions Last Dose Informant Taking? bumetanide (BUMEX) 2 mg tablet 2020 at Unknown time Transfer Papers Yes Sig: Take 1 Tab by mouth daily as needed. citalopram (CELEXA) 20 mg tablet 2020 at Unknown time Transfer Papers Yes Sig: TAKE 1 TABLET BY MOUTH EVERY MORNING AS NEEDED FOR ANXIETY DEPPRESSION  
clopidogrel (PLAVIX) 75 mg tab 2020 at Unknown time Transfer Papers Yes Sig: Take 1 Tab by mouth daily. gabapentin (NEURONTIN) 600 mg tablet 2020 at Unknown time Transfer Papers Yes Sig: Take 600 mg by mouth nightly. insulin NPH (HUMULIN N NPH U-100 INSULIN) 100 unit/mL injection 2020 at Unknown time Transfer Papers Yes Si Units by SubCUTAneous route daily. insulin NPH (HUMULIN N NPH U-100 INSULIN) 100 unit/mL injection 2020 at Unknown time Transfer Papers Yes Si Units by SubCUTAneous route daily (with dinner). losartan (COZAAR) 25 mg tablet 2020 at Unknown time Transfer Papers Yes Sig: Take 1 Tab by mouth daily. metolazone (ZAROXOLYN) 5 mg tablet 2020 at Unknown time Transfer Papers Yes Sig: Take 5 mg by mouth daily as needed. omeprazole (PRILOSEC) 20 mg capsule 2020 at Unknown time Transfer Papers Yes Sig: TAKE ONE CAPSULE BY MOUTH EVERY DAY  
rosuvastatin (CRESTOR) 40 mg tablet 2020 at Unknown time Transfer Papers Yes Sig: Take 1 Tab by mouth nightly. Facility-Administered Medications: None Thank you, 
Wil Hitchcock Medication History Pharmacy Technician

## 2020-01-20 NOTE — PROGRESS NOTES
01/20/20 1431 Vitals Temp 98 °F (36.7 °C) Temp Source Axillary Pulse (Heart Rate) 79 Heart Rate Source Monitor Resp Rate 20  
O2 Sat (%) 96 % Level of Consciousness (!) Responds to Pain /55 MAP (Calculated) 79 BP Patient Position At rest  
Cardiac Rhythm NSR  
MEWS Score 3 Pain 1 Pain Scale 1 Adult Nonverbal Pain Scale Pain Intensity 1 0 Patient Stated Pain Goal 0 Adult Nonverbal Pain Scale Face 0 Activity (Movement) 0 Guarding 0 Physiology (Vital Signs) 0 Respiratory 0 Total Score 0 Oxygen Therapy O2 Device Ventimask Per son patient was this lethargic down stairs, will call Ivett Oliva from ED to confirm. 1508- No answer from the ED 
1518- Per Ivett Oliva, patient has been lethargic all day, didn't get much sleep last night. Will continue to monitor.

## 2020-01-20 NOTE — PROGRESS NOTES
1556 
Pt is too lethargic for swallowing eval. We will follow up tomorrow. Speech path 6693 Acknowledge consult. The pt is on ventimask and cannot be evaluated until weaned to [place]. We will follow Chinedu Witt, SLP

## 2020-01-20 NOTE — PROGRESS NOTES
1818- Blood glucose 45 
 
1825- Paged [place] NP, orders received to administer D50 once and recheck blood glucose in one hour 1831- Administered D50

## 2020-01-20 NOTE — PROGRESS NOTES
Hospitalist Progress Note NAME: Cynda Libman Sr.  
:  1935 MRN:  [id number] I reviewed with Dr. Jeffery Villatoro about the medical history and the findings on the physical examination. I discussed with Dr. Jeffery Villatoro the patient's diagnosis and concur with the plan. Interim Hospital Summary: 80 y.o. male whom presented on 2020 with AMS. Assessment / Plan: Metabolic encephalopathy secondary to PNA POA 
CAP 
- CT ABD/PEL, chest: Bibasilar atelectasis/consolidation greater on the left than on the right. Head CT: Cerebral atrophy and chronic microvascular ischemic change Coronary artery disease. No acute intraperitoneal process is identified. Continue with IV cefepime and Vanc Blood cx pending Respiratory sputum cx to be send if sample is available Wean O2 as long as O2 sat is greater than or equal to 92%. Pt is currently on Ventimask, BIPAP PRN Was seen by SPL - pt was too groggy to participate Hypertension Hx of CHF per chart review - bumex on hold NT pro- BP within normal range Echo (2019): Estimated left ventricular ejection fraction is 61 - 65%. Hyperlipidemia 
- continue with statin therapy JULIO on CKD III 
- creat 2.97 on admission Will continue to follow Type II DM with hyperglycemia 
- A1C 7.4 Check qac/qhs blood glucose and follow SSI BMI BMI 27.05 kg/m² Code status: Full Prophylaxis: Hep SQ Recommended Disposition: TBD To be seen by PT/OT Subjective: Chief Complaint / Reason for Physician Visit \"I get short of breath\". Discussed with RN events overnight. Review of Systems: 
Symptom Y/N Comments  Symptom Y/N Comments Fever/Chills n   Chest Pain n   
Poor Appetite    Edema Cough y   Abdominal Pain Sputum n   Joint Pain SOB/THAKKAR y   Pruritis/Rash Nausea/vomit n   Tolerating PT/OT Diarrhea n   Tolerating Diet Constipation n   Other Could NOT obtain due to: Objective: VITALS:  
Last 24hrs VS reviewed since prior progress note. Most recent are: 
Patient Vitals for the past 24 hrs: 
 Temp Pulse Resp BP SpO2  
01/20/20 0645  98 22 119/49 95 % 01/20/20 0616  96 16 116/59 96 % 01/20/20 0600  98 21 132/53 96 % 01/20/20 0545  97 20 125/52 94 % 01/20/20 0530  98 21 140/56 94 % 01/20/20 0515  96 20 126/55 94 % 01/20/20 0500  97 20 125/50 92 % 01/20/20 0445  (!) 101 20 146/66 92 % 01/20/20 0315  83 18 138/49 95 % 01/20/20 0300  83 19 129/53 95 % 01/20/20 0245  77 18 100/46 95 % 01/20/20 0230  78 18 (!) 85/46 97 % 01/20/20 0228     96 % 01/20/20 0215  79 19 (!) 77/39 99 % 01/20/20 0157 98 °F (36.7 °C) 81 19 (!) 89/51 96 % Intake/Output Summary (Last 24 hours) at 1/20/2020 4254 Last data filed at 1/20/2020 5023 Gross per 24 hour Intake 500 ml Output  Net 500 ml PHYSICAL EXAM: 
General: Ill appearing. Open eyes to verbal response, able to answer with multiple trial. Cooperative but appear very groggy, no acute distress EENT:  EOMI. Anicteric sclerae. MMM Resp:  Coarse breath sounds with decreased breath sounds at bases. no wheezing or rales. No accessory muscle use CV:  Regular  rhythm,  No edema GI:  Soft, Non distended, Non tender. +Bowel sounds Neurologic:  oriented X self only, very groggy to answer to all questions, normal speech, Psych:   Poor insight when awake. Not anxious nor agitated Skin:  No rashes. No jaundice Reviewed most current lab test results and cultures  YES Reviewed most current radiology test results   YES Review and summation of old records today    NO Reviewed patient's current orders and MAR    YES 
PMH/SH reviewed - no change compared to H&P 
________________________________________________________________________ Care Plan discussed with: 
  Comments Patient y Family RN y   
Care Manager Consultant Multidiciplinary team rounds were held today with , nursing, pharmacist and clinical coordinator. Patient's plan of care was discussed; medications were reviewed and discharge planning was addressed. ________________________________________________________________________________________________ Chito Pate NP Procedures: see electronic medical records for all procedures/Xrays and details which were not copied into this note but were reviewed prior to creation of Plan. LABS: 
I reviewed today's most current labs and imaging studies. Pertinent labs include: 
Recent Labs  
  01/20/20 
0203 WBC 13.2* HGB 8.9* HCT 28.6*  
 Recent Labs  
  01/20/20 
0203   
K 4.7  CO2 32 * BUN 65* CREA 2.97* CA 8.1*  
MG 2.1 ALB 2.4* TBILI 0.5 SGOT 12* ALT 10* Signed: )Ivone Marin NP

## 2020-01-20 NOTE — ED NOTES
TRANSFER - OUT REPORT: 
 
Verbal report given to Leigh(name) on Thersa Mohs.  being transferred to PCU (unit) for routine progression of care Report consisted of patients Situation, Background, Assessment and  
Recommendations(SBAR). Information from the following report(s) SBAR, Kardex, ED Summary, [facility] and Recent Results was reviewed with the receiving nurse. Lines:  
Peripheral IV 01/20/20 Left Wrist (Active) Site Assessment Clean, dry, & intact 1/20/2020  2:15 AM  
Phlebitis Assessment 0 1/20/2020  2:15 AM  
Infiltration Assessment 0 1/20/2020  2:15 AM  
Dressing Status Clean, dry, & intact 1/20/2020  2:15 AM  
   
Peripheral IV 01/20/20 Right Wrist (Active) Site Assessment Clean, dry, & intact 1/20/2020  2:15 AM  
Phlebitis Assessment 0 1/20/2020  2:15 AM  
Infiltration Assessment 0 1/20/2020  2:15 AM  
Dressing Status Clean, dry, & intact 1/20/2020  2:15 AM  
   
Peripheral IV 01/20/20 Right Antecubital (Active) Site Assessment Clean, dry, & intact 1/20/2020  2:15 AM  
Phlebitis Assessment 0 1/20/2020  2:15 AM  
Infiltration Assessment 0 1/20/2020  2:15 AM  
Dressing Status Clean, dry, & intact 1/20/2020  2:15 AM  
  
 
Opportunity for questions and clarification was provided. Patient transported with: 
 Monitor Registered Nurse Tech

## 2020-01-20 NOTE — ED TRIAGE NOTES
EMS reports pt from long term care facility, pt was found unresponsive, gl;ucose was checked WNL, pt was put on non-rebreather mask.

## 2020-01-20 NOTE — ED NOTES
Pt. With large formed BM at this time. Pt. Provided with merry care. Pt. With no other complaints. Pt. Placed back in position of comfort with call bell in reach.

## 2020-01-20 NOTE — PROGRESS NOTES
1343- Called ED pod and ED charge, no answer 1405- TRANSFER - IN REPORT: 
 
Verbal report received from Lupe(name) on [employer].  being received from ED(unit) for routine progression of care Report consisted of patients Situation, Background, Assessment and  
Recommendations(SBAR). Information from the following report(s) SBAR, ED Summary and Cardiac Rhythm (NSR) was reviewed with the receiving nurse. Opportunity for questions and clarification was provided. Assessment completed upon patients arrival to unit and care assumed.
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
## 2020-01-21 NOTE — PROGRESS NOTES
0730 - Bedside shift change report given to Beckie Khan (oncoming nurse) by Paige Anna (offgoing nurse). Report included the following information SBAR, Kardex, Intake/Output, MAR and Recent Results. 1040 - Wound culture to head incision obtained per Luis Workman NP. Removed old dressing and replaced with Optifoam until further instruction from wound care team. Pr drowsy but easy to wake. Disoriented to place and time. States he is the president. Did not know the year, but was able to state his name. 1045 - Speech therapy at bedside to assess pt. Tolerating liquids and crackers well. 1115 - Wound care at bedside. Pt much more alert and awake. Oriented to self, date and location at thie time. Laughed when made aware of his answers earlier when NP at bedside. States he must have been talking in his sleep. Currently sitting up in bed watching tv. 
 
1530 - PT/OT assisted pt up to chair. States he will need lift team to return to bed due to heavy leaning when up with walker.

## 2020-01-21 NOTE — PROGRESS NOTES
Hospitalist Progress Note NAME: Pieter Rosenthal Sr.  
:  1935 MRN:  [id number] I reviewed with Dr. Yulissa Hunt about the medical history and the findings on the physical examination. I discussed with Dr. Yulissa Hunt the patient's diagnosis and concur with the plan. Interim Hospital Summary: 80 y.o. male whom presented on 2020 with AMS. Assessment / Plan: 
Pt is alert and orient to self only. Follows simple commends. Metabolic encephalopathy secondary to PNA POA 
CAP 
- CT ABD/PEL, chest: Bibasilar atelectasis/consolidation greater on the left than on the right. Head CT: Cerebral atrophy and chronic microvascular ischemic change Coronary artery disease. No acute intraperitoneal process is identified. Continue with IV cefepime and Vanc Blood cx () no growth so far Respiratory sputum cx to be send if sample is available Wean O2 as long as O2 sat is greater than or equal to 92%. Pt is currently on 2L via n/c. BIPAP PRN 
  SPL cleared for pt to eat diabetic diet Lactic acid 1.2 S/p craniotomy to evacuate hematoma by Dr. Giovanni Ramos at [facility] on 2019 Right scalp wound with non-healing edge Marcelo Snyder Band return phone call ([phone], [phone]); Pt fell at home and went to [address] [place] [address] on 2019. Pt developed subdural hematoma which was evacuated by Dr. Giovanni Ramos at . [place] [address]. The pt has been followed by Dr. Giovanni Ramos, neurosurgeon and Dr. Alta Marie, plastic surgeon at [address] [place] [address] since the surgery, most recent visit was 2020. They were planning to do skin flap in near future. Dr. Giovanni Ramos placed him on keflex to prevent infection during recent visit. Plan of care: will treat the infection if wound cx comes back positive. Will clean the wound with normal saline then cover with dry dressing. Pt will return to Dr. Suraj Marie' care for further surgical intervention once discharge from [facility]. This appears to be a scalp flap that a portion of the skin has  off. The eschar is hard and adherent and measures 11 x 6 cm's. The entire flap is \"U\" shaped . There is a scant amount of purulent drainage from area of distal edge of eschar and an open area from flap edge separation at 9 o'clock of eschar aprox 1.5 x 0.8 x 0.2 with some slough and pink tissue. Wound care: cleanse with dermal wound cleanser, wipe clean. Cover with a piece of Xeroform gauze, small sacral foam dressing and then stretch netting Purulent drainage from the edges of the wound;  wound cx sent Wound cx from 2020 (which was sent from [facility]) grew spah coag negative and staph aureus. Hypertension Hx of CHF per chart review (it is unclear whether this pt has CHF or hx of CHF at this time. DX arrived from the chart review. Normal EF, normal NT pro-BNP, no dependent pitting edema. Placed a call to pcp, waiting for return call) - bumex on hold while on IV hydration. Check daily weight 178lbs ARB on hold due to JULIO Start on BB with parameter NT pro- Echo (2019): Estimated left ventricular ejection fraction is 61 - 65%. Hyperlipidemia 
- continue with statin therapy JULIO secondary to dehydration 
- creat 2.97 on admission, currently creat 2.0 Continue with IV hydration. Type II DM with hyperglycemia 
- A1C 7.4 Check qac/qhs blood glucose and follow SSI Anemia of unknown etiology 
- will send anemia work up 
  hgb 8.8 BMI BMI 27.05 kg/m² Code status: Full Prophylaxis: Hep SQ Recommended Disposition:return to rehab, follow up with neurosurgeon & plastic surgeon upon discharge Subjective: Chief Complaint / Reason for Physician Visit \"I am Gagandeep Honeycutt. I don't care about the date or who the president is\". Discussed with RN events overnight. Review of Systems: 
Symptom Y/N Comments  Symptom Y/N Comments Fever/Chills n   Chest Pain n   
Poor Appetite    Edema Cough n   Abdominal Pain Sputum n   Joint Pain SOB/THAKKAR n   Pruritis/Rash Nausea/vomit n   Tolerating PT/OT Diarrhea n   Tolerating Diet Constipation n   Other Could NOT obtain due to:   
 
Objective: VITALS:  
Last 24hrs VS reviewed since prior progress note. Most recent are: 
Patient Vitals for the past 24 hrs: 
 Temp Pulse Resp BP SpO2  
01/21/20 1057 97.6 °F (36.4 °C) 89 19 118/46 95 % 01/21/20 0727 97.9 °F (36.6 °C) 97 20 128/52 96 % 01/21/20 0406 97.3 °F (36.3 °C) 94 20 140/54 93 % 01/20/20 2334 97.3 °F (36.3 °C) 85 20 116/53 93 % 01/20/20 2003 98.4 °F (36.9 °C) 88 19 131/55 94 % 01/20/20 1924 98.4 °F (36.9 °C) 86 19 110/53 95 % 01/20/20 1455     95 % 01/20/20 1431 98 °F (36.7 °C) 79 20 126/55 96 % 01/20/20 1400  87 20 103/56 95 % Intake/Output Summary (Last 24 hours) at 1/21/2020 1200 Last data filed at 1/21/2020 1132 Gross per 24 hour Intake 1621.25 ml Output 1225 ml Net 396.25 ml PHYSICAL EXAM: 
General: Ill appearing. Alert. Kent to self only. Cooperative, no acute distress EENT:  EOMI. Anicteric sclerae. MMM Resp:  Clear in apex with decreased breath sounds at bases. no wheezing or rales. No accessory muscle use CV:  Regular  rhythm,  No edema GI:  Soft, Non distended, Non tender. +Bowel sounds Neurologic:  oriented X self only, very groggy to answer to all questions, normal speech, Psych:   Poor insight when awake. Not anxious nor agitated Skin:  No rashes. No jaundice Right Scalp: The eschar is hard and adherent and measures 11 x 6 cm's. The entire flap is \"U\" shaped . There is a scant amount of purulent drainage from area of distal edge of eschar and an open area from flap edge separation at 9 o'clock of eschar aprox 1.5 x 0.8 x 0.2 with some slough and pink tissue. Reviewed most current lab test results and cultures  YES Reviewed most current radiology test results   YES 
 Review and summation of old records today    NO Reviewed patient's current orders and MAR    YES 
PMH/SH reviewed - no change compared to H&P 
________________________________________________________________________ Care Plan discussed with: 
  Comments Patient y Family  y Pt's son RN y   
Care Manager Consultant   Dr. Bob Damico from [facility] Multidiciplinary team rounds were held today with , nursing, pharmacist and clinical coordinator. Patient's plan of care was discussed; medications were reviewed and discharge planning was addressed. ________________________________________________________________________________________________ Chito Packer NP Procedures: see electronic medical records for all procedures/Xrays and details which were not copied into this note but were reviewed prior to creation of Plan. LABS: 
I reviewed today's most current labs and imaging studies. Pertinent labs include: 
Recent Labs  
  01/21/20 0426 01/20/20 
0203 WBC 11.0 13.2* HGB 8.8* 8.9* HCT 28.9* 28.6*  
 365 Recent Labs  
  01/21/20 
0426 01/20/20 
0731 01/20/20 
5080   --  139  
K 4.6  --  4.7   --  101 CO2 28  --  32  
GLU 92  --  148* BUN 53*  --  65* CREA 2.08*  --  2.97* CA 8.5  --  8.1*  
MG  --   --  2.1 ALB  --   --  2.4* TBILI  --   --  0.5 SGOT  --   --  12* ALT  --   --  10* INR 1.1 1.1  --   
 
 
Signed: )Chito Packer, NP
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
## 2020-01-21 NOTE — PROGRESS NOTES
1900 - Bedside and Verbal shift change report given to Amanda López RN (oncoming nurse) by Srikanth Jones RN (offgoing nurse). Report included the following information SBAR, Kardex, Intake/Output, MAR, Recent Results and Cardiac Rhythm NSR. Pt very lethargic, responding to painful stimuli. MD aware per day shift nurse. BG low and given glucose, to recheck at [address]. Pt responds \"I dont know\" to every question asked. Pt shakes head when asked to open eyes. Currently on 3L with O2 sat >95%. Call bell within reach. Will continue to monitor. 1947 - . 
  
2000 - Pt more Alert at this time. Pt following commands. Equal  strength, facial movements symmetrical, equal strength in LE. Pt denies any pain. VSS. When asked what pt name is pt states \"I dont know. \" Asked pt birthday and able to respond correctly. Pt asked \"Where am I? \" and \"Why am I here? \" Pt then proceeded to state he needed to pee. Assisted pt with urinal. Pt has wound on back of head that is black in color with eschar. Covered with ABD and dressing tape. Call bell within reach. Bed alarm on. Will continue to monitor. 2334 - Pt able to carry on conversation with me at this time. Pt eyes open spontaneously, purposeful movement with equal strength, Pt alert and oriented to self only. Pt able to name all of his kids and talk to me about them. Pt remains on 3L NC. Bed alarm on, call bell within reach, VSS. Will continue to monitor. 0410 - Pt alert at this time. VSS. Will continue to monitor. Call bell within reach. Bed alarm on.  
 
0502 - Pt cleaned of very large amount of urine and incontinence of BM. Pt given CHG bath, bed pad changed, linens changed, gown changed, and zinc cream applied to sacrum. Pt resting quietly in bed at this time. No complaints of pain or discomfort. Call bell within reach. Bed alarm on. Will continue to monitor. 0512 - .  Pt asking for water, explained to pt that he cannot eat or drink anything until speech evaluates ability to swallow. Pt verbalized understanding stating \"As soon as the doctor says I can, I'll take that water. \" Call bell within reach. Bed alarm on. Will continue to monitor. 0700 - Shift report given to Goyo Cantu RN.
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
## 2020-01-21 NOTE — CDMP QUERY
Pt admitted with pna and has CHF documented. Please further specify type and acuity of CHF in the medical record. ? Chronic Systolic CHF ? Chronic Diastolic CHF ? Chronic Systolic and Diastolic CHF 
? Other, please specify ? Clinically unable to determine The medical record reflects the following: 
  Risk Factors: htn, dm 
  Clinical Indicators: 1/20 pn: hx of chf per chart review, pbnp 340, echo (7/2019) ef 61-65% Treatment: \"bumex on hold\" Thanks, Elis Judge RN/CDMP
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
## 2020-01-21 NOTE — PROGRESS NOTES
Pharmacy Automatic Renal Dosing Protocol - Antimicrobials Indication for Antimicrobials: Pneumonia (VAP) Current Regimen of Each Antimicrobial: 
Cefepime 1 g q 24 hours (; day #3 Vancomycin 1,250 mg q 36 hours Previous Antimicrobial Therapy: 
Zosyn 3.375 gram iv q8h X1 day Significant Cultures: Paired blood cultures  Radiology / Imaging results: (X-ray, CT scan or MRI): 
 
Paralysis, amputations, malnutrition: 
 
Labs: 
Recent Labs  
  20 
0426 20 
3134 CREA 2.08* 2.97* BUN 53* 65* WBC 11.0 13.2* Temp (24hrs), Av.8 °F (36.6 °C), Min:97.3 °F (36.3 °C), Max:98.4 °F (36.9 °C) Creatinine Clearance (mL/min) or Dialysis: 27 Impression/Plan:  
Continue with cefepime If we continue with the current dose of Vancomycin, the estimated trough is ~8 mcg/ml, since the Scr has improved. Will adjust the vancomycin to 750 mg q 18 hours and this dose will yield an estimated trough of 15 mcg/ml and an AUC of 498 Scr trending down WBC trending down Afebrile Antimicrobial stop date: pending Pharmacy will follow daily and adjust medications as appropriate for renal function and/or serum levels. Thank you, Eyad Juarez, PHARMD 
 
Recommended duration of therapy 
http://[facility]/Pharmacy/Clinical%20Companion/Duration%20of%20ABX%20therapy. docx Renal Dosing 
http://[facility]/[place]/[facility]/Pharmacy/Clinical%20Companion/Renal%20Dosing%[id number]. pdf

## 2020-01-22 NOTE — PROGRESS NOTES
Pharmacy Automatic Renal Dosing Protocol - Antimicrobials Indication for Antimicrobials: Pneumonia (VAP) Current Regimen of Each Antimicrobial: 
Cefepime 2 g q 12 hours (; day #3 Vancomycin 1,000 mg q 16 hours Previous Antimicrobial Therapy: 
Zosyn 3.375 gram iv q8h X1 day Significant Cultures: Paired blood cultures  Radiology / Imaging results: (X-ray, CT scan or MRI): 
 
Paralysis, amputations, malnutrition: 
 
Labs: 
Recent Labs  
  20 
0351 20 
0426 20 
5193 CREA 1.52* 2.08* 2.97* BUN 43* 53* 65* WBC 7.7 11.0 13.2* Temp (24hrs), Av.6 °F (36.4 °C), Min:97.3 °F (36.3 °C), Max:97.7 °F (36.5 °C) Creatinine Clearance (mL/min) or Dialysis: 40 Impression/Plan:  
Vancomycin adjusted to 1000 mg Q16H with anticipated trough of 17.2 mcg/ml. Cefepime adjusted to 2grams Q12H per renal dosing protocol Scr trending down WBC trending down Afebrile Antimicrobial stop date: pending Pharmacy will follow daily and adjust medications as appropriate for renal function and/or serum levels. Thank you, 
Fernando Marin, [facility] Recommended duration of therapy 
http://[facility]/[place]/[facility]/Pharmacy/Clinical%20Companion/Duration%20of%20ABX%20therapy. docx Renal Dosing 
http://[facility]/[place]/[facility]/Pharmacy/Clinical%20Companion/Renal%[id number]. pdf
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
## 2020-01-22 NOTE — PROGRESS NOTES
Post Fall Documentation Maggie Matos witnessed/unwitnessed fall occurred on 1/22/2020 (Date) at [phone] (Time). The answers to the following questions summarize the fall: In the patient's own words,: 
· What were you attempting to do when you fell? \"I needed to go to the bathroom. I thought I could go by myself\" · Do you know why you fell? no 
· Do you have any pain/discomfort or any other complaints? My elbow hurts · Which part of your body made contact with the floor or other object? Left elbow, buttocks and head Nurse: 
? Was this an assisted fall? no 
? Was fall witnessed? No 
? If witnessed, what part of the body made contact with the floor or other object? ? Patients mental status after the fall/when found: Alert and oriented ? Any apparent injury:  Bleeding and Laceration(s) ? Immediate interventions for injury/suspected injury? Stopped bleeding and Other Ct scan/xray ? Patient assisted back to bed? Assist X2 
? Name of provider notified and time, any comments? [address] [place] [address],  [phone]      
? Name of family member notified and time:  
 
 
Immediate VS and physical assessment documented in flow sheets. Neuro assessment every hour x 4 (for potential head injury or unwitnessed fall) documented in flow sheets.  
 
 
Ghanshyam Rucker RN

## 2020-01-22 NOTE — PROGRESS NOTES
Problem: Diabetes Self-Management Goal: *Monitoring blood glucose, interpreting and using results Description Identify recommended blood glucose targets  and personal targets. Outcome: Progressing Towards Goal 
  
Problem: Falls - Risk of 
Goal: *Absence of Falls Description Document Susi Blanca Fall Risk and appropriate interventions in the flowsheet. Outcome: Progressing Towards Goal 
Note: Fall Risk Interventions: 
Mobility Interventions: Bed/chair exit alarm, Patient to call before getting OOB, PT Consult for mobility concerns, PT Consult for assist device competence, Strengthening exercises (ROM-active/passive), Utilize walker, cane, or other assistive device, Utilize gait belt for transfers/ambulation, OT consult for ADLs Mentation Interventions: Adequate sleep, hydration, pain control, Bed/chair exit alarm, Door open when patient unattended, More frequent rounding, Reorient patient Medication Interventions: Bed/chair exit alarm, Patient to call before getting OOB, Teach patient to arise slowly Elimination Interventions: Bed/chair exit alarm, Call light in reach, Toileting schedule/hourly rounds, Patient to call for help with toileting needs History of Falls Interventions: Bed/chair exit alarm Problem: Pressure Injury - Risk of 
Goal: *Prevention of pressure injury Description Document John Scale and appropriate interventions in the flowsheet. Outcome: Progressing Towards Goal 
Note: Pressure Injury Interventions: 
Sensory Interventions: Assess changes in LOC, Assess need for specialty bed, Chair cushion, Check visual cues for pain, Float heels, Keep linens dry and wrinkle-free, Maintain/enhance activity level, Minimize linen layers, Monitor skin under medical devices, Pad between skin to skin, Turn and reposition approx. every two hours (pillows and wedges if needed), Use 30-degree side-lying position, Pressure redistribution bed/mattress (bed type) Moisture Interventions: Absorbent underpads, Check for incontinence Q2 hours and as needed, Internal/External urinary devices, Limit adult briefs, Minimize layers, Moisture barrier, Maintain skin hydration (lotion/cream), Contain wound drainage Activity Interventions: PT/OT evaluation, Pressure redistribution bed/mattress(bed type), Increase time out of bed, Chair cushion Mobility Interventions: PT/OT evaluation, Pressure redistribution bed/mattress (bed type), HOB 30 degrees or less, Float heels, Turn and reposition approx. every two hours(pillow and wedges) Nutrition Interventions: Offer support with meals,snacks and hydration, Document food/fluid/supplement intake Friction and Shear Interventions: Minimize layers, Lift sheet, HOB 30 degrees or less, Foam dressings/transparent film/skin sealants, Apply protective barrier, creams and emollients

## 2020-01-22 NOTE — PROGRESS NOTES
0730 - Bedside shift change report given to [facility] (oncoming nurse) by Kushal Do (offgoing nurse). Report included the following information SBAR, Kardex, Procedure Summary, Intake/Output, MAR and Recent Results. 5869 - Pt missed a few orientation questions including location and reason for admission. He had removed his O2, but sats were 93% so will remain on RA for now. 1308 - xray to left elbow completed at bedside. Nurse manager [facility] and PCT transporting pt to CT post fall.

## 2020-01-22 NOTE — PROGRESS NOTES
Problem: Mobility Impaired (Adult and Pediatric) Goal: *Acute Goals and Plan of Care (Insert Text) Description FUNCTIONAL STATUS PRIOR TO ADMISSION: Patient is a poor historian and unable to provide much information. Was at SNF for rehab and appears he had crani at some point but patient unable to provide this history. Unsure if he was ambulatory. HOME SUPPORT PRIOR TO ADMISSION: At baseline patient was living alone and daughters checked in on him Physical Therapy Goals Initiated 1/21/2020 1. Patient will move from supine to sit and sit to supine  in bed with minimal assistance/contact guard assist within 7 day(s). 2.  Patient will transfer from bed to chair and chair to bed with moderate assistance  using the least restrictive device within 7 day(s). 3.  Patient will perform sit to stand with minimal assistance/contact guard assist within 7 day(s). 4.  Patient will ambulate with moderate assistance  for 10 feet with the least restrictive device within 7 day(s). Outcome: Progressing Towards Goal 
PHYSICAL THERAPY TREATMENT Patient: Sophie Woodard (80 y.o. male) Date: 1/22/2020 Diagnosis: ARF (acute respiratory failure) ([facility] [address] [J96.00] <principal problem not specified> Precautions: Fall Chart, physical therapy assessment, plan of care and goals were reviewed. ASSESSMENT Patient continues with skilled PT services and is progressing towards goals. Pt was received in supine and cleared by nursing to mobilize. He was able to come to the EOB with improved sitting balance compared to last session. He was able to ambulate with RW a few steps to the chair. He was left sitting up in the chair. And set up to eat lunch. Nursing was aware he was left sitting up and that his IV looked infiltrated. Current Level of Function Impacting Discharge (mobility/balance): max A Other factors to consider for discharge: PLAN : 
 Patient continues to benefit from skilled intervention to address the above impairments. Continue treatment per established plan of care. to address goals. Recommendation for discharge: (in order for the patient to meet his/her long term goals) Therapy up to 5 days/week in SNF setting This discharge recommendation: 
Has not yet been discussed the attending provider and/or case management IF patient discharges home will need the following DME: to be determined (TBD) SUBJECTIVE:  
Patient stated yea ok.  OBJECTIVE DATA SUMMARY:  
Critical Behavior: 
Neurologic State: Confused Orientation Level: Oriented to person, Disoriented to situation, Disoriented to time Cognition: Decreased attention/concentration, Decreased command following, Impaired decision making Safety/Judgement: Fall prevention Functional Mobility Training: 
Bed Mobility: 
Rolling: Total assistance(decreased attention and command following) Supine to Sit: Maximum assistance; Additional time(decreased initation and command following) Scooting: Total assistance(decreased attention and command following ) Transfers: 
Sit to Stand: Maximum assistance Stand to Sit: Minimum assistance Bed to Chair: (after max assist sit to stand, min A x2 to pivot chair RW) Balance: 
Sitting - Static: Good (unsupported) Sitting - Dynamic: Good (unsupported) Standing: Impaired Standing - Static: Constant support; Fair 
Standing - Dynamic : Poor Ambulation/Gait Training: 
  
 Ambulated a few steps with RW and min A to get tot he chair. Activity Tolerance:  
Good Please refer to the flowsheet for vital signs taken during this treatment.  
 
 
After treatment patient left in no apparent distress:  
Sitting in chair and Call bell within reach eating lunch with tray table in front of him, nursing notified pt was in the chair eating lunch, pt did not have bed alarm on in bed upon arrival, pt sat in the chair yesterday without chair alarm without any occurrences COMMUNICATION/COLLABORATION:  
The patients plan of care was discussed with: Occupational Therapist and Registered Nurse Rutherford Carrel, PT, DPT Time Calculation: 20 mins

## 2020-01-22 NOTE — PROGRESS NOTES
Problem: Self Care Deficits Care Plan (Adult) Goal: *Acute Goals and Plan of Care (Insert Text) Description FUNCTIONAL STATUS PRIOR TO ADMISSION: Unable to accurately obtain from pt due to confusion, per chart pt was at SNF for rehab and previous admit pt was ambulatory and living at home alone with family in the area HOME SUPPORT PRIOR TO ADMISSION: pt was at [facility] for rehab Occupational Therapy Goals: 
Initiated 1/21/2020 1. Patient will perform grooming seated with independence within 7 days. 2. Patient will perform upper body dressing with minimal assistance within 7 days. 3. Patient will perform toileting with maximal assistance within 7 days. 4. Patient will perform lower body dressing with max assist within 7 days. 5. Patient will improve static sitting balance to CGA in midline for increased independence with ADLS within 7 days. 6. Patient will transfer from bedside commode with moderate assistance using the least restrictive device and appropriate durable medical equipment within 7 days. Outcome: Progressing Towards Goal 
 OCCUPATIONAL THERAPY TREATMENT Patient: Kory Deleon (80 y.o. male) Date: 1/22/2020 Diagnosis: ARF (acute respiratory failure) ([facility] [address] [J96.00] <principal problem not specified> Precautions: Fall Chart, occupational therapy assessment, plan of care, and goals were reviewed. ASSESSMENT Patient continues with skilled OT services and is progressing towards goals. Pt had decreased attention and command following at times. Increased assist needed with bed mobility due to decreased initiation. Improved dynamic seated and standing balance this session and pt did not have any LOB seated EOB during grooming task today. Improved independence with mobility overall and pt was able to mobilize with Rw to bedside chair with max assist sit to stand and min assist x2 to transfer to chair. Current Level of Function Impacting Discharge (ADLs): Bed to Chair: (after max assist sit to stand, min A x2 to pivot chair RW) Feeding Container Management: Set-up Cutting Food: Stand-by assistance Utensil Management: Stand-by assistance Food to Mouth: Supervision Drink to Mouth: Supervision Grooming Washing Face: Stand-by assistance(cues for throughness and seated EOB) Other factors to consider for discharge: none PLAN : 
Patient continues to benefit from skilled intervention to address the above impairments. Continue treatment per established plan of care. to address goals. Recommend with staff: OOB for meals pt needs set up Recommend next OT session: standing balance, further ADLs Recommendation for discharge: (in order for the patient to meet his/her long term goals) Therapy up to 5 days/week in SNF setting This discharge recommendation: 
Has been made in collaboration with the attending provider and/or case management IF patient discharges home will need the following DME: defer to SNF SUBJECTIVE:  
Patient stated The same old Sh*t.  Referring to the food that he was given for lunch. Asked pt if there was something he would like other than what he had but he declined. OBJECTIVE DATA SUMMARY:  
Cognitive/Behavioral Status: 
Neurologic State: Confused Orientation Level: Oriented to person;Disoriented to situation;Disoriented to time Cognition: Decreased attention/concentration;Decreased command following; Impaired decision making Perception: Appears intact Perseveration: No perseveration noted Safety/Judgement: Fall prevention Functional Mobility and Transfers for ADLs: 
Bed Mobility: 
Rolling: Total assistance(decreased attention and command following) Supine to Sit: Maximum assistance; Additional time(decreased initation and command following) Scooting: Total assistance(decreased attention and command following ) Transfers: Sit to Stand: Maximum assistance Bed to Chair: (after max assist sit to stand, min A x2 to pivot chair RW) Balance: 
Sitting - Static: Good (unsupported) Sitting - Dynamic: Good (unsupported) Standing: Impaired Standing - Static: Constant support; Fair 
Standing - Dynamic : Poor ADL Intervention: 
Feeding Container Management: Set-up Cutting Food: Stand-by assistance Utensil Management: Stand-by assistance Food to Mouth: Supervision Drink to Mouth: Supervision Grooming Washing Face: Stand-by assistance(cues for throughness and seated EOB) Cognitive Retraining Safety/Judgement: Fall prevention Pain: 
0/10 Activity Tolerance:  
Fair Please refer to the flowsheet for vital signs taken during this treatment. After treatment patient left in no apparent distress:  
Sitting in chair and Call bell within reach eating lunch with tray table in front of him, nursing notified pt was in the chair eating lunch, pt did not have bed alarm on in bed upon arrival, pt sat in the chair yesterday without chair alarm without any occurrences COMMUNICATION/COLLABORATION:  
The patients plan of care was discussed with: Physical Therapist, Registered Nurse and patient Aditi Aesncio OTR/L Time Calculation: 23 mins

## 2020-01-23 NOTE — PROGRESS NOTES
[facility]--Skilled Nursing Facility resumption. Spoke with patient and his son. Both would like to return to [address] [place] [address] to continue his rehab. Referral sent to [address] [place] [address] and will await their response. Daja Garcia RN BSN CRM  [phone]

## 2020-01-23 NOTE — PROGRESS NOTES
Problem: Mobility Impaired (Adult and Pediatric) Goal: *Acute Goals and Plan of Care (Insert Text) Description FUNCTIONAL STATUS PRIOR TO ADMISSION: Patient is a poor historian and unable to provide much information. Was at SNF for rehab and appears he had crani at some point but patient unable to provide this history. Unsure if he was ambulatory. HOME SUPPORT PRIOR TO ADMISSION: At baseline patient was living alone and daughters checked in on him Physical Therapy Goals Initiated 1/21/2020 1. Patient will move from supine to sit and sit to supine  in bed with minimal assistance/contact guard assist within 7 day(s). 2.  Patient will transfer from bed to chair and chair to bed with moderate assistance  using the least restrictive device within 7 day(s). 3.  Patient will perform sit to stand with minimal assistance/contact guard assist within 7 day(s). 4.  Patient will ambulate with moderate assistance  for 10 feet with the least restrictive device within 7 day(s). Note: PHYSICAL THERAPY TREATMENT Patient: Erika Gonzalez (80 y.o. male) Date: 1/23/2020 Diagnosis: [facility] [J96.00] <principal problem not specified> Precautions: Fall Chart, physical therapy assessment, plan of care and goals were reviewed. ASSESSMENT Patient continues with skilled PT services and is progressing towards goals. Patient with improved balance and mobility today, requiring less assistance with bed mobility and ambulation. Bed mobility with mod assist x 2. Good sitting balance EOB although patient complains of feeling lightheaded. BP stable. Sit to stand with min assist x 2. Patient ambulated a few feet to chair then stood and ambulated to bathroom with rolling walker and min assist x 1. Patient takes shortened steps with decreased step clearance with fair balance. Mod assist for toilet transfers.   Patient ambulated back to chair with increasingly flexed posture with reports of back pain. Bed alarm in place and RN aware of patient being in chair. Patient reminded to use call bell to ask for assistance to get out of chair. Current Level of Function Impacting Discharge (mobility/balance): min/mod assist 
 
Other factors to consider for discharge: safety awareness PLAN : 
Patient continues to benefit from skilled intervention to address the above impairments. Continue treatment per established plan of care. to address goals. Recommendation for discharge: (in order for the patient to meet his/her long term goals) Therapy up to 5 days/week in SNF setting This discharge recommendation: 
Has been made in collaboration with the attending provider and/or case management IF patient discharges home will need the following DME: to be determined (TBD) SUBJECTIVE:  
Patient stated I'm okay.  OBJECTIVE DATA SUMMARY:  
Critical Behavior: 
Neurologic State: Alert, Confused Orientation Level: Oriented to person, Oriented to place, Disoriented to situation, Oriented to time Cognition: Follows commands, Impulsive, Memory loss, Poor safety awareness Safety/Judgement: Fall prevention Functional Mobility Training: 
Bed Mobility: 
Rolling: Moderate assistance Supine to Sit: Moderate assistance;Assist x2 Scooting: Minimum assistance Transfers: 
Sit to Stand: Minimum assistance;Assist x2(mod assist from lower surface) Stand to Sit: Minimum assistance; Additional time Bed to Chair: Minimum assistance;Assist x2 Balance: 
Sitting: Intact Sitting - Static: Good (unsupported) Sitting - Dynamic: Good (unsupported) Standing: Impaired Standing - Static: Constant support;Good Standing - Dynamic : Fair;Constant support Ambulation/Gait Training: 
Distance (ft): 20 Feet (ft)(10' x 2) Assistive Device: Gait belt;Walker, rolling Ambulation - Level of Assistance: Minimal assistance;Assist x1 
  
  
 Gait Abnormalities: Decreased step clearance;Shuffling gait Base of Support: Narrowed Speed/Herlinda: Shuffled; Slow Step Length: Left shortened;Right shortened Pain Rating: 
Back pain with no number given Activity Tolerance:  
Fair Please refer to the flowsheet for vital signs taken during this treatment. After treatment patient left in no apparent distress:  
Sitting in chair, Call bell within reach, and Bed / chair alarm activated COMMUNICATION/COLLABORATION:  
The patients plan of care was discussed with: Registered Nurse Elzbieta Shah, PT Time Calculation: 27 mins

## 2020-01-23 NOTE — PROGRESS NOTES
Hospitalist Progress Note NAME: Castro Sierra Sr.  
:  1935 MRN:  [id number] I reviewed with Dr. Khai Hunter about the medical history and the findings on the physical examination. I discussed with Dr. Khai Hunter the patient's diagnosis and concur with the plan. Interim Hospital Summary: 80 y.o. male whom presented on 2020 with AMS. Assessment / Plan: Pt was groggy this morning. According to the son, pt was working as a night time  at the [employer] So he usually sleeps during the day time, awake at night time. Emphasized the importance of not taking any caffenated drinks after 4pm.  We must work on reverse on his day sleeping pattern for pt be fully awake when work with PT/OT. C/o headache around lunch time which relieved after taking tylenol. Pt may needs repeat head CT if he experiences unresolved headache. Spoke with pt's son; goals of care - full restorative care Metabolic encephalopathy secondary to PNA POA 
CAP 
- CT ABD/PEL, chest: Bibasilar atelectasis/consolidation greater on the left than on the right. Repeat Head CT after the fall (): Unchanged tiny right frontal extra-axial fluid collection, compatible with postoperative changes. No new intracranial hemorrhage, mass, or acute infarct. Head CT (): Cerebral atrophy and chronic microvascular ischemic change Coronary artery disease. No acute intraperitoneal process is identified. Continue with IV cefepime and Vanc Blood cx () no growth so far Respiratory sputum cx to be send if sample is available RA O2 sat 98% Tolerating diabetic diet without difficulty Lactic acid 1.2 S/p craniotomy to evacuate hematoma by Dr. Edwin Veronica at [facility] on 2019 Right scalp wound with non-healing edge [facility] Dr. James General return phone call ([phone], [phone]);  Pt fell at home and went to [facility] on 2019. Pt developed subdural hematoma which was evacuated by Dr. Susie Reed at [place] [address]. The pt has been followed by Dr. Susie Reed, neurosurgeon and Dr. Julia Greco, plastic surgeon at [facility] since the surgery, most recent visit was 2020. They were planning to do skin flap in near future. Dr. Susie Reed placed him on keflex to prevent infection during recent visit. Plan of care: will treat the infection if wound cx comes back positive. Will clean the wound with normal saline then cover with dry dressing. Pt will return to Dr. Denia Greco' care for further surgical intervention once discharge from [facility]. This appears to be a scalp flap that a portion of the skin has  off. The eschar is hard and adherent and measures 11 x 6 cm's. The entire flap is \"U\" shaped . There is a scant amount of purulent drainage from area of distal edge of eschar and an open area from flap edge separation at 9 o'clock of eschar aprox 1.5 x 0.8 x 0.2 with some slough and pink tissue. Wound care: cleanse with dermal wound cleanser, wipe clean. Cover with a piece of Xeroform gauze, small sacral foam dressing and then stretch netting Purulent drainage from the edges of the wound;   
  wound cx (20): staph aureus, gram (+) cocci Wound cx from 2020 (which was sent from [facility]) grew spah coag negative and staph aureus. Hx of CHF per chart review (it is unclear whether this pt has CHF or hx of CHF at this time. DX arrived from the chart review. Normal EF, normal NT pro-BNP, no dependent pitting edema. Placed a call to pcp, waiting for return call) - pt was taking bumex as need as outpatient. I spoke with Dr. Gerald Hickey in regards pt's clinical issues. We decided to discontinue bumex at this time. Pt was taking bumex for ankle swelling as need. No dependent or pedal edema at this time. Hypertension 
- continue with BB with parameter ARB on hold due to JULIO Start on BB with parameter NT pro- Echo (7/2019): Estimated left ventricular ejection fraction is 61 - 65%. Hyperlipidemia 
- continue with statin therapy JULIO secondary to dehydration 
- creat 2.97 on admission, currently creat 1.2 
 d/c'd IV hydration. Tolerating diabetic diet Type II DM with hyperglycemia 
- A1C 7.4 Check qac/qhs blood glucose and follow SSI Hx CVA Hx of implanted loop monitor record placement (7/2019) by Dr. Aliyah Akins 
Gait instability Iron deficient anemia 
- normal Folate and vitamin B 12 Iron 24, TIBC 205, Iron Sat 12%, Ferritin 60 Unable to give IV iron due to active infection (PNA). Will start on Iron supplement with bowel regimen 
  hgb 8.5 BMI BMI 27.05 kg/m² Code status: Full Prophylaxis: Hep SQ Recommended Disposition:return to rehab, follow up with neurosurgeon & plastic surgeon upon discharge ** Discharge summary to be faxed to Dr. Tyson Fierro office upon discharge. Subjective: Chief Complaint / Reason for Physician Visit \"I feel ok. I am sleepy. \"  Discussed with RN events overnight. Review of Systems: 
Symptom Y/N Comments  Symptom Y/N Comments Fever/Chills n   Chest Pain n   
Poor Appetite    Edema Cough n   Abdominal Pain Sputum n   Joint Pain SOB/THAKKAR n   Pruritis/Rash Nausea/vomit n   Tolerating PT/OT Diarrhea n   Tolerating Diet Constipation n   Other Could NOT obtain due to:   
 
Objective: VITALS:  
Last 24hrs VS reviewed since prior progress note. Most recent are: 
Patient Vitals for the past 24 hrs: 
 Temp Pulse Resp BP SpO2  
01/23/20 0319 98 °F (36.7 °C) (!) 59 15 153/47 97 % 01/22/20 2318 97.9 °F (36.6 °C) (!) 56 19 171/52 97 % 01/22/20 2119  75     
01/22/20 1946 98.1 °F (36.7 °C) (!) 58 19 149/54 96 % 01/22/20 1511 97 °F (36.1 °C) 84 18 146/43 96 % 01/22/20 1510    146/43   
01/22/20 1509  62 17    
01/22/20 1302  (!) 56 17   01/22/20 1301 97.4 °F (36.3 °C) (!) 56 18 165/54 93 % 01/22/20 1300  (!) 56 17    
01/22/20 1238  62 19 150/47   
01/22/20 1233  73 16 (!) 150/102 94 % 01/22/20 1232  81 14  97 % 01/22/20 0757  74 19  98 % 01/22/20 0756 97.7 °F (36.5 °C) 74 19 140/43 98 % 01/22/20 0755  87 19  99 % Intake/Output Summary (Last 24 hours) at 1/23/2020 3651 Last data filed at 1/22/2020 2316 Gross per 24 hour Intake 1883 ml Output 475 ml Net 1408 ml PHYSICAL EXAM: 
General: Ill appearing. Alert. [place] to self only. Cooperative, no acute distress EENT:  EOMI. Anicteric sclerae. MMM Resp:  Clear in apex with decreased breath sounds at bases. no wheezing or rales. No accessory muscle use CV:  Regular  rhythm,  No edema GI:  Soft, Non distended, Non tender. +Bowel sounds Neurologic:  oriented X self only, very groggy to answer to all questions, normal speech, Psych:   Poor insight when awake. Not anxious nor agitated Skin:  No rashes. No jaundice Right Scalp: The eschar is hard and adherent and measures 11 x 6 cm's. The entire flap is \"U\" shaped . There is a scant amount of purulent drainage from area of distal edge of eschar and an open area from flap edge separation at 9 o'clock of eschar aprox 1.5 x 0.8 x 0.2 with some slough and pink tissue. 2cm laceration on left elbow, cleaned with NS and covered with dry dressing. Reviewed most current lab test results and cultures  YES Reviewed most current radiology test results   YES Review and summation of old records today    NO Reviewed patient's current orders and MAR    YES 
PMH/SH reviewed - no change compared to H&P 
________________________________________________________________________ Care Plan discussed with: 
  Comments Patient y Family RN y   
Care Manager Consultant                   Multidiciplinary team rounds were held today with case manager, nursing, pharmacist and clinical coordinator. Patient's plan of care was discussed; medications were reviewed and discharge planning was addressed. ________________________________________________________________________________________________ Chito Catalan NP Procedures: see electronic medical records for all procedures/Xrays and details which were not copied into this note but were reviewed prior to creation of Plan. LABS: 
I reviewed today's most current labs and imaging studies. Pertinent labs include: 
Recent Labs  
  01/23/20 0534 01/22/20 
0351 01/21/20 
0426 WBC 8.0 7.7 11.0 HGB 8.5* 7.9* 8.8* HCT 26.3* 26.6* 28.9*  
 285 301 Recent Labs  
  01/23/20 0534 01/22/20 
0351 01/21/20 
0426 01/20/20 
6172  139 143  --   
K 3.9 4.6 4.6  --   
 107 108  --   
CO2 28 30 28  --   
* 261* 92  --   
BUN 27* 43* 53*  --   
CREA 1.23 1.52* 2.08*  --   
CA 8.9 8.2* 8.5  --   
INR  --  1.0 1.1 1.1 Signed: )Ajit iDop NP

## 2020-01-23 NOTE — PROGRESS NOTES
[facility]--Skilled Nursing Facility. [address] [place] [address] will accept patient back and he will need insurance authorization. The facility has started the auth and will let me know once approved. Second medicare im letter discussed with patient and son and in agreement. Signed copy placed on chart and patient given a copy. Daja Garcia RN BSN CRM  [phone]

## 2020-01-23 NOTE — PROGRESS NOTES
Occupational Therapy Attempted to see patient for OT treatment. Patient is sleeping soundly and not able to participate. Spoke with nursing, who indicates that he is used to working the night shift and always sleeps until at least 11:00, and suggest afternoons may work better for him. Will defer for now but continue to follow.

## 2020-01-23 NOTE — PROGRESS NOTES
Problem: Falls - Risk of 
Goal: *Absence of Falls Description Document Earl Miller Fall Risk and appropriate interventions in the flowsheet. Outcome: Progressing Towards Goal 
Note: Fall Risk Interventions: 
Mobility Interventions: Bed/chair exit alarm, Patient to call before getting OOB, Strengthening exercises (ROM-active/passive), Utilize walker, cane, or other assistive device, PT Consult for assist device competence, PT Consult for mobility concerns, OT consult for ADLs, Utilize gait belt for transfers/ambulation Mentation Interventions: Bed/chair exit alarm, Adequate sleep, hydration, pain control, Door open when patient unattended, More frequent rounding, Reorient patient Medication Interventions: Bed/chair exit alarm, Patient to call before getting OOB, Teach patient to arise slowly Elimination Interventions: Bed/chair exit alarm, Call light in reach, Patient to call for help with toileting needs, Toileting schedule/hourly rounds History of Falls Interventions: Bed/chair exit alarm Problem: Pressure Injury - Risk of 
Goal: *Prevention of pressure injury Description Document John Scale and appropriate interventions in the flowsheet. Outcome: Progressing Towards Goal 
Note: Pressure Injury Interventions: 
Sensory Interventions: Assess changes in LOC, Assess need for specialty bed, Check visual cues for pain, Discuss PT/OT consult with provider, Keep linens dry and wrinkle-free, Float heels, Minimize linen layers, Monitor skin under medical devices, Maintain/enhance activity level, Turn and reposition approx. every two hours (pillows and wedges if needed), Pressure redistribution bed/mattress (bed type) Moisture Interventions: Absorbent underpads, Internal/External urinary devices, Check for incontinence Q2 hours and as needed, Limit adult briefs, Minimize layers, Moisture barrier, Maintain skin hydration (lotion/cream), Contain wound drainage Activity Interventions: PT/OT evaluation, Pressure redistribution bed/mattress(bed type), Increase time out of bed, Chair cushion Mobility Interventions: PT/OT evaluation, Pressure redistribution bed/mattress (bed type), HOB 30 degrees or less, Float heels, Turn and reposition approx. every two hours(pillow and wedges) Nutrition Interventions: Document food/fluid/supplement intake Friction and Shear Interventions: Feet elevated on foot rest, Minimize layers

## 2020-01-24 PROBLEM — F07.81 POST-CONCUSSION VERTIGO: Status: RESOLVED | Noted: 2019-01-01 | Resolved: 2020-01-01

## 2020-01-24 PROBLEM — Z98.890 S/P CRANIOTOMY: Status: ACTIVE | Noted: 2020-01-01

## 2020-01-24 PROBLEM — N17.9 AKI (ACUTE KIDNEY INJURY) (HCC): Status: ACTIVE | Noted: 2020-01-01

## 2020-01-24 PROBLEM — R32 URINARY INCONTINENCE: Status: RESOLVED | Noted: 2018-04-03 | Resolved: 2020-01-01

## 2020-01-24 PROBLEM — R26.81 GAIT INSTABILITY: Status: ACTIVE | Noted: 2020-01-01

## 2020-01-24 PROBLEM — B95.8 STAPH SKIN INFECTION: Status: ACTIVE | Noted: 2020-01-01

## 2020-01-24 PROBLEM — G45.9 TIA (TRANSIENT ISCHEMIC ATTACK): Status: RESOLVED | Noted: 2019-01-01 | Resolved: 2020-01-01

## 2020-01-24 PROBLEM — D50.9 IRON DEFICIENCY ANEMIA: Status: ACTIVE | Noted: 2020-01-01

## 2020-01-24 PROBLEM — F07.81 POST CONCUSSION SYNDROME: Status: RESOLVED | Noted: 2019-01-01 | Resolved: 2020-01-01

## 2020-01-24 PROBLEM — I63.112: Status: RESOLVED | Noted: 2019-01-01 | Resolved: 2020-01-01

## 2020-01-24 PROBLEM — E78.5 HYPERLIPEMIA: Status: ACTIVE | Noted: 2020-01-01

## 2020-01-24 PROBLEM — R41.82 ALTERED MENTAL STATUS, UNSPECIFIED: Status: RESOLVED | Noted: 2019-01-01 | Resolved: 2020-01-01

## 2020-01-24 PROBLEM — R42 DIZZINESS: Status: RESOLVED | Noted: 2019-01-01 | Resolved: 2020-01-01

## 2020-01-24 PROBLEM — J18.9 COMMUNITY ACQUIRED PNEUMONIA: Status: ACTIVE | Noted: 2020-01-01

## 2020-01-24 PROBLEM — G44.309 POST-CONCUSSION HEADACHE: Status: RESOLVED | Noted: 2019-01-01 | Resolved: 2020-01-01

## 2020-01-24 PROBLEM — R42 POST-CONCUSSION VERTIGO: Status: RESOLVED | Noted: 2019-01-01 | Resolved: 2020-01-01

## 2020-01-24 PROBLEM — G93.41 METABOLIC ENCEPHALOPATHY: Status: ACTIVE | Noted: 2020-01-01

## 2020-01-24 PROBLEM — L08.9 STAPH SKIN INFECTION: Status: ACTIVE | Noted: 2020-01-01

## 2020-01-24 PROBLEM — R55 CONVULSIVE SYNCOPE: Status: RESOLVED | Noted: 2019-01-01 | Resolved: 2020-01-01

## 2020-01-24 NOTE — DISCHARGE INSTRUCTIONS
HOSPITALIST DISCHARGE INSTRUCTIONS    NAME: Monica Javed.   :  1935   MRN:  [id number]     Date/Time:  2020 9:43 AM    ADMIT DATE: 2020   DISCHARGE DATE: 2020     Attending Physician: Eliecer Edmond NP    DISCHARGE DIAGNOSIS:  C/Tiffany Lowery 1106 Problems    Diagnosis Date Noted    Hyperlipemia 2020    [facility]acute [facility]) ([facility] 2020    Gait instability 2020    Iron deficiency anemia     Metabolic encephalopathy     Community acquired pneumonia 2020    S/P craniotomy 2020    Staph skin infection 2020    ARF (acute respiratory failure) ([facility] 2020    CVA (cerebral vascular accident) ([facility] 2019    Essential hypertension with goal blood pressure less than 140/90 2016    IDDM (insulin dependent diabetes mellitus) ([facility] 06/10/2014       Medications: Per above medication reconciliation. Pain Management: per above medications    Recommended diet: Cardiac Diet and Diabetic Diet    Recommended activity: Activity as tolerated    Wound care: See surgical/procedure care instructions    Indwelling devices:  None    Supplemental Oxygen: None    Required Lab work: Per SNF routine    Glucose management:  Accucheck ACHS with sliding scale per SNF protocol    Code status: Full        Outside physician follow up: Follow-up Information     Follow up With Specialties Details Why Contact Info    Garth Perkins [address]  [phone]      [address] [place] [address]  [phone]               Skilled nursing facility/ SNF MD responsible for above on discharge. Information obtained by :  I understand that if any problems occur once I am at home I am to contact my physician.     I understand and acknowledge receipt of the instructions indicated above.                                                                                                                                            Physician's or R.N.'s Signature                                                                  Date/Time                                                                                                                                              Patient or Repres

## 2020-01-24 NOTE — PROGRESS NOTES
0725: Bedside report given to [facility]. Pt had an uneventful night. Pt denied headaches, dizziness, and blurry vision. Pt able to orient and engage appropriately in conversation. No acute s/s of distress noted.

## 2020-01-24 NOTE — PROGRESS NOTES
[facility] PLAN--Skilled Nursing Facility Patient is being discharged back to [facility] today. Spoke with jose and they have auth and they have a bed for the patient today. Nursing to call report to [phone] and patient to go to room 54a. PCS completed with medicals and given to nursing. Referral sent to [facility] and awaiting their response. Initial physician order written on 1/20/20 at 0536am. 
 
 
Daja Garcia  RN BSN CRM  [phone]

## 2020-01-24 NOTE — PROGRESS NOTES
Problem: Falls - Risk of 
Goal: *Absence of Falls Description Document Darrell Lilly Fall Risk and appropriate interventions in the flowsheet. Outcome: Progressing Towards Goal 
Note: Fall Risk Interventions: 
Mobility Interventions: Bed/chair exit alarm, OT consult for ADLs, Patient to call before getting OOB, PT Consult for mobility concerns, PT Consult for assist device competence, Strengthening exercises (ROM-active/passive), Utilize walker, cane, or other assistive device, Utilize gait belt for transfers/ambulation Mentation Interventions: Bed/chair exit alarm, Adequate sleep, hydration, pain control, More frequent rounding, Reorient patient, Door open when patient unattended Medication Interventions: Bed/chair exit alarm, Patient to call before getting OOB, Teach patient to arise slowly Elimination Interventions: Call light in reach, Patient to call for help with toileting needs History of Falls Interventions: Bed/chair exit alarm Problem: Pressure Injury - Risk of 
Goal: *Prevention of pressure injury Description Document John Scale and appropriate interventions in the flowsheet. Outcome: Progressing Towards Goal 
Note: Pressure Injury Interventions: 
Sensory Interventions: Float heels, Check visual cues for pain, Minimize linen layers, Keep linens dry and wrinkle-free, Assess changes in LOC, Assess need for specialty bed, Monitor skin under medical devices, Turn and reposition approx. every two hours (pillows and wedges if needed), Pressure redistribution bed/mattress (bed type) Moisture Interventions: Absorbent underpads, Internal/External urinary devices, Limit adult briefs, Contain wound drainage, Check for incontinence Q2 hours and as needed Activity Interventions: PT/OT evaluation, Pressure redistribution bed/mattress(bed type), Increase time out of bed Mobility Interventions: PT/OT evaluation, Turn and reposition approx. every two hours(pillow and wedges) Nutrition Interventions: Document food/fluid/supplement intake Friction and Shear Interventions: Feet elevated on foot rest, Minimize layers

## 2020-01-24 NOTE — PROGRESS NOTES
[facility] [facility]. Patient is being transported at 1130 am today to [address] [place] [address]. Called son and informed him and he is fine with this. Informed patient and the facility. Daja Garcia  RN BSN [facility]  [phone] Transition of Care Plan to SNF/Rehab Communication to Patient/Family: Met with patient and family and they are agreeable to the transition plan. The Plan for Transition of Care is related to the following treatment goals: Rehabilitation. The Patient and/or patient representative was provided with a choice of provider and agrees  with the discharge plan. Yes [x] No [] A Freedom of choice list was provided with basic dialogue that supports the patient's individualized plan of care/goals and shares the quality data associated with the providers. Yes [x] No [] SNF/Rehab Transition: 
Patient has been accepted to go back to [facility] an Rehab SNF/Rehab and meets criteria for admission. Patient will transported by [facility] and expected to leave at 1130am 
 
Communication to SNF/Rehab: 
Bedside RN, Nikhil Billingsley, has been notified to update the transition plan to the facility and call report to [phone] Discharge information has been updated on the AVS. And communicated to facility via [employer], or CC link. Discharge instructions to be fax'd to facility at [phone]. Patient has been identified as part of the [employer].  For Care Coordination associated with that Bundle Program, please contact-sound Bundle information has been communication to sound. Nursing Please include all hard scripts for controlled substances, med rec and dc summary,  AVS in packet. Reviewed and confirmed with facility, nursing and liason, can manage the patient care needs for the following:  
 
Flor Suh with (X) only those applicable: 
Medication: 
[x]Medications are available at the facility []IV Antibiotics [x]Controlled Substance  hard copies available sent. []Weekly Labs Equipment: 
[]CPAP/BiPAP []Wound Vacuum []Bruno or Urinary Device []PICC/Central Line []Nebulizer []Ventilator Treatment: 
[]Isolation (for MRSA, VRE, etc.) []Surgical Drain Management []Tracheostomy Care 
[]Dressing Changes []Dialysis with transportation []PEG Care []Oxygen []Daily Weights for Heart Failure Dietary: 
[]Any diet limitations []Tube Feedings []Total Parenteral Management (TPN) Financial Resources: 
[]Medicaid Application Completed []UAI Completed and copy given to pt/family 
and copy given to pt/family 
[]A screening has previously been completed. []Level II Completed 
 
[] Private pay individual who will not become  
financially eligible for Medicaid within 6 months from admission to a [address] [place] [address]. [x] Individual refused to have screening conducted. []Medicaid Application Completed []The screening denied because it was determined individual did not need/did not qualify for nursing facility level of care. [] Out of state residents seeking direct admission to a [address] facility. [] Individuals who are inpatients of an out of state hospital, or in state or out of state veterans/ hospital and seek direct admission to a [address] facility [] Individuals who are pateints or residents of a state owned/operated facility that is licensed by Department of [employer] ([employer]) and seek direct admission to [address] facility [] A screening not required for enrollment in [facility] services as set out in 12 VAC 30- [] [facility]) staff shall perform screenings of the [facility] clients. Advanced Care Plan: 
[]Surrogate Decision Maker of Care 
[]POA []Communicated Code Status and copy sent.    
Other:  
Per son the plan is for patient to return home and he will have help for him at home after he completes rehab. Son did not want screening.

## 2020-01-24 NOTE — DISCHARGE SUMMARY
Hospitalist Discharge Summary Patient ID: iBanka Barr 
[id number] 
72 y.o. 
1935 1/20/2020 PCP on record: Ana Kern MD 
 
Admit date: 1/20/2020 Discharge date and time: 1/24/2020 DISCHARGE DIAGNOSIS: 
 
Active Hospital Problems Diagnosis Date Noted  Hyperlipemia 01/24/2020  JULIO (acute kidney injury) ([facility] 01/24/2020  Gait instability 01/24/2020  Iron deficiency anemia 01/24/2020  Metabolic encephalopathy 71/55/5824  Community acquired pneumonia 01/24/2020  S/P craniotomy 01/24/2020  Staph skin infection 01/24/2020  ARF (acute respiratory failure) ([facility] 01/20/2020  CVA (cerebral vascular accident) ([facility] 07/02/2019  Essential hypertension with goal blood pressure less than 140/90 06/06/2016  [facility] mellitus[facility] 06/10/2014 CONSULTATIONS: 
None Excerpted HPI from H&P of Tawnya Epstein MD: 
\"80 years old male from nursing home with past medical history significant for DM, hypertension, hyperlipidemia, chronic kidney disease presented to the hospital for evaluation of change mental status, blood work daily significant for elevated white blood cell count 13.2, CT chest was done and showed bibasilar atelectasis and consolidation greater on the left than the right. \" 
______________________________________________________________________ DISCHARGE SUMMARY/HOSPITAL COURSE:  for full details see H&P, daily progress notes, labs, consult notes. Pita Holt Sr.84 y.o.male was admitted to [facility] on 1/20/2020 and treated for the following medical complaints: Metabolic encephalopathy secondary to pneumonia, POA Community acquired pneumonia, POA · CT chest:  Bibasilar atelectasis/consolidation greater on the left than on the right. Coronary artery disease. No acute intraperitoneal process is identified. · Head CT:  Cerebral atrophy and chronic microvascular ischemic change · Repeat head CT:  Unchanged tiny right frontal extra-axial fluid collection, compatible with postoperative changes. No new intracranial hemorrhage, mass, or acute infarct. · Received IV cefepime and vancomycin; will continue doxycycline at dicharge · BC NGTD · RA O2 98% · Lactic acid 1.2 S/p craniotomy to evacuate hematoma by Dr. David Barnett at [place] [address], 2019 Right scalp wound with non-healing edge · Per phone conversation with Dr. David Barnett- \"Pt fell at home and went to [facility] on 2019. Pt developed subdural hematoma which was evacuated by Dr. David Barnett at [place] [address]. The pt has been followed by Dr. David Barnett, neurosurgeon and Dr. Kateryna Lozada, plastic surgeon at [facility] since the surgery, most recent visit was 2020. They were planning to do skin flap in near future. Dr. David Barnett placed him on keflex to prevent infection during recent visit. \" · This appears to be a scalp flap that a portion of the skin has  off. The eschar is hard and adherent and measures 11 x 6 cm's. The entire flap is \"U\" shaped . There is a scant amount of purulent drainage from area of distal edge of eschar and an open area from flap edge separation at 9 o'clock of eschar aprox 1.5 x 0.8 x 0.2 with some slough and pink tissue. · Wound care: cleanse with dermal wound cleanser, wipe clean. Cover with a piece of Xeroform gauze, small sacral foam dressing and then stretch netting · Purulent drainage from wound edge on 20- wound culture shoed staph aureus · Received IV vancomycin during admission; will discharge on clindamycin per sensitivities HTN 
· Continue BB 
· ARB held during admission d/t JULIO Hyperlipidemia · Continue statin JULIO secondary to dehydration- resolved Type 2 DM with hyperglycemia · Hgb A1c 7.4 · BS AC and HS 
· SSI Hx CVA Hx of implanted loop monitor record placement by Dr. Sidra Holland, 2019 Gait instability Iron deficiency anemia · Normal folate and Vitamin B12 · Iron 24, TIBC 205, Iron sat 12%, Ferritin 60 · Continue Iron supplement · Hgb 9.0 Patient's plan of care has been reviewed with them. Patient and/or family have verbally conveyed their understanding and agreement of the patient's signs, symptoms, diagnosis, treatment and prognosis and additionally agree to follow up as recommended or return to [facility] should their condition change prior to follow-up. Discharge instructions have also been provided to the patient with some educational information regarding their diagnosis as well a list of reasons why they would want to return to the office prior to their follow-up appointment should their condition change. Please fax discharge summary to Dr. Daniela De La Torre office at discharge ([phone]) 
_______________________________________________________________________ Patient seen and examined by me on discharge day. Pertinent Findings: 
Gen:    Not in distress Chest: Clear lungs CVS:   Regular rhythm. No edema Abd:  Soft, not distended, not tender Neuro:  Alert, oriented  
_______________________________________________________________________ DISCHARGE MEDICATIONS:  
Current Discharge Medication List  
  
START taking these medications Details  
acetaminophen (TYLENOL) 325 mg tablet Take 2 Tabs by mouth every six (6) hours as needed. amLODIPine (NORVASC) 2.5 mg tablet Take 1 Tab by mouth daily. Qty: 30 Tab, Refills: 0  
  
ferrous sulfate 325 mg (65 mg iron) tablet Take 1 Tab by mouth daily (with breakfast). Qty: 30 Tab, Refills: 0  
  
metoprolol tartrate (LOPRESSOR) 25 mg tablet Take 0.5 Tabs by mouth every twelve (12) hours. Qty: 30 Tab, Refills: 0  
  
clindamycin (CLEOCIN) 300 mg capsule Take 1 Cap by mouth four (4) times daily for 7 days. Qty: 28 Cap, Refills: 0  
  
doxycycline (MONODOX) 100 mg capsule Take 1 Cap by mouth two (2) times a day for 4 days. Qty: 8 Cap, Refills: 0 CONTINUE these medications which have NOT CHANGED Details  
gabapentin (NEURONTIN) 600 mg tablet Take 600 mg by mouth nightly. !! insulin NPH (HUMULIN N NPH U-100 INSULIN) 100 unit/mL injection 30 Units by SubCUTAneous route daily. !! insulin NPH (HUMULIN N NPH U-100 INSULIN) 100 unit/mL injection 27 Units by SubCUTAneous route daily (with dinner). clopidogrel (PLAVIX) 75 mg tab Take 1 Tab by mouth daily. Qty: 30 Tab, Refills: 5  
  
losartan (COZAAR) 25 mg tablet Take 1 Tab by mouth daily. Qty: 90 Tab, Refills: 1 Associated Diagnoses: Essential hypertension with goal blood pressure less than 140/90  
  
bumetanide (BUMEX) 2 mg tablet Take 1 Tab by mouth daily as needed. Qty: 90 Tab, Refills: 1 Associated Diagnoses: Pedal edema  
  
omeprazole (PRILOSEC) 20 mg capsule TAKE ONE CAPSULE BY MOUTH EVERY DAY Qty: 90 Cap, Refills: 1 Comments: **Patient requests 90 days supply** Associated Diagnoses: Gastroesophageal reflux disease without esophagitis  
  
citalopram (CELEXA) 20 mg tablet TAKE 1 TABLET BY MOUTH EVERY MORNING AS NEEDED FOR ANXIETY DEPPRESSION Qty: 90 Tab, Refills: 1  
  
rosuvastatin (CRESTOR) 40 mg tablet Take 1 Tab by mouth nightly. Qty: 90 Tab, Refills: 1 Associated Diagnoses: Mixed hyperlipidemia  
  
metolazone (ZAROXOLYN) 5 mg tablet Take 5 mg by mouth daily as needed. !! - Potential duplicate medications found. Please discuss with provider. STOP taking these medications  
  
 insulin NPH (HUMULIN N NPH INSULIN KWIKPEN) 100 unit/mL (3 mL) inpn Comments:  
Reason for Stopping:   
   
  
 
 
 
Patient Follow Up Instructions: Activity: PT/OT Eval and Treat Diet: Diabetic Diet Wound Care: As directed Follow-up with PCP in 1 week. Follow-up Information Follow up With Specialties Details Why Contact Info Ramona Davis MD [facility]   [address] [facility] [address] [facility]. [phone] [address] [facility] [place] [facility] Giovanni [place] 
[phone]  
  
 
________________________________________________________________ Risk of deterioration: Moderate Condition at Discharge:  Stable 
__________________________________________________________________ Disposition SNF/LTC 
 
____________________________________________________________________ Code Status: Full Code 
___________________________________________________________________ Total time in minutes spent coordinating this discharge (includes going over instructions, follow-up, prescriptions, and preparing report for sign off to her PCP) :  31 minutes Signed: 
NYDIA Sarmiento

## 2020-01-27 NOTE — PROGRESS NOTES
Transition of care outreach postponed for 14 days due to patient's discharge to SNF. D/c to [facility] [address] 1/24/20 f/u 14d

## 2020-01-28 NOTE — TELEPHONE ENCOUNTER
Spoke with pt son, Margaret Sanchez who is on HIPAA. Mr [facility] in in the hospital recovering from surgery. Dylan Snowden will take the monitor to the hospital and give me a call so we can do a manual transmission.
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
## 2020-01-30 NOTE — PROGRESS NOTES
Met with patient's son, daughter and daughter-in-law in ER family support room. Provided ministry of presence and grief support as family members recalled memories of patient some of which brought tears and others that evoked laughter. Son shared patient and his wife attended [facility] for many years before his wife grew ill and . Family was waiting for two more family members before they were going to bedside.  received two pages for needs elsewhere. Offered family assurance of prayer and will attempt to follow up. ZEYAD Skinner, [facility], Staff  [facility]  Paging Service  [phone])

## 2020-01-30 NOTE — ED NOTES
1332- Patient arrived via EMS Report obtained Patient was being transferred from [address] [place] [address] reported that when patient was being transferred from bed to stretcher and went into cardiac arrest.  CPR was initiated at facility 911 called, patient was placed on Life Band in route. Patient was intubated in route, IO placed in Left shin. EMS reported patient received 5 rounds of Epi last dose given at [phone] and remained in [place]. EMS also reported upon arrival that patient had been in Asystole for 45 min Dr. Marley Garcia, Respiratory, Pharmacy at bedside 1333 Patient was placed on Monitor . 1334 - , 1mg Epi given via IO in Left lower leg 1336 - Rhythm check and Ultrasound check, life band removed - No pulse CPR resumed. 1338 - Rhythm Check/ultrasound check - Patient remains in Asystole 1339 - CPR resumed. 1340 -Rhythm Check - Patient remains in Asystole,  Time of Death called by Dr. Marley Garcia 800 [facility] notified

## 2020-02-10 ENCOUNTER — PATIENT OUTREACH (OUTPATIENT)
Dept: CARDIOLOGY CLINIC | Age: 85
End: 2020-02-10

## 2021-08-03 NOTE — PROGRESS NOTES
Hospitalist Progress Note NAME: Slade Bravo .  
:  1935 MRN:  [id number] Assessment / Plan: 
Acute on to of Chronic CVA asa, statin e could explain the ataxia,  
  
Multiple emboli evidenced on MRI cardiology to evaluate for possible FLYNN , results pending  
  
Headache controlled much better ,  
  
DM 2 continue insulin regimen ,  
  
Suspected concussion continue current regimen , 
  
Intractable N/V improved  
  
  
Hyponatremia continue current regimen improved  
  
Chronic cough continue ,  
  
Unknown type hyperlipidemia statin  
  
Diabetic neuropathy continue current regimen ,  
 
Hypoglycemia likely secondary to glipizide and npo Body mass index is 29.43 kg/m². Returned from FLYNN noted to be hypoxic requiring o2, will keep over night f/p with FLYNN results , 
 
Code status: DNR Prophylaxis: Lovenox Recommended Disposition: home Subjective: Chief Complaint / Reason for Physician Visit Discussed with RN events overnight. Seen and examined at bedside comfortable no fever, no chills, no nausea had FLYNN Review of Systems: 
Symptom Y/N Comments  Symptom Y/N Comments Fever/Chills n   Chest Pain n   
Poor Appetite n   Edema n   
Cough n   Abdominal Pain n   
Sputum n   Joint Pain n   
SOB/THAKKAR n   Pruritis/Rash n   
Nausea/vomit n   Tolerating PT/OT n Diarrhea n   Tolerating Diet Constipation n   Other Could NOT obtain due to:   
 
Objective: VITALS:  
Last 24hrs VS reviewed since prior progress note. Most recent are: 
Patient Vitals for the past 24 hrs: 
 Temp Pulse Resp BP SpO2  
19 1600  87  131/83 92 % 19 1550  88 22 143/54 (!) 89 % 19 1545  89 16 142/55 93 % 19 1540  92 22 145/59 91 % 19 1535  91 24 134/53 93 % 19 1530  91 22 141/79 96 % 19 1525  90 22 147/60 94 % 19 1520  90 20 143/57 93 % 19 1515  90 21 137/58 93 % 19 1510  74 20 148/53 94 % 07/02/19 1505  87 20 153/84 94 % 07/02/19 1500  90 21 160/69 95 % 07/02/19 1455  85 19 149/73 94 % 07/02/19 1450  93 21 156/64 93 % 07/02/19 1445  94 22 161/60 92 % 07/02/19 1430  92 21 150/59 95 % 07/02/19 1415  92 21 140/50 95 % 07/02/19 1400  91 20 142/60 95 % 07/02/19 1345  96 20 142/59 96 % 07/02/19 1330  97 22 156/63 96 % 07/02/19 1315  96 21 161/69 95 % 07/02/19 1256  96  157/64   
07/02/19 0821  (!) 104  142/59 99 % 07/02/19 0820  96  147/82 92 % 07/02/19 0819 97.6 °F (36.4 °C) 93 22 170/67 91 % 07/02/19 0431 97.9 °F (36.6 °C) 85 16 147/56 96 % 07/01/19 2356 98.2 °F (36.8 °C) 86 20 157/60 94 % 07/01/19 1923  94  125/48   
07/01/19 1921  86  136/44   
07/01/19 1920 97.7 °F (36.5 °C) 81 20 141/55 91 % Intake/Output Summary (Last 24 hours) at 7/2/2019 1711 Last data filed at 7/2/2019 6247 Gross per 24 hour Intake 125 ml Output 2100 ml Net -1975 ml PHYSICAL EXAM: 
General: WD, WN. Alert, cooperative, no acute distress   
EENT:  EOMI. Anicteric sclerae. MMM Resp:  CTA bilaterally, no wheezing or rales. No accessory muscle use CV:  Regular  rhythm,  No edema GI:  Soft, Non distended, Non tender.  +Bowel sounds Neurologic:  Alert and oriented X 3, normal speech, Psych:   Good insight. Not anxious nor agitated Skin:  No rashes. No jaundice Reviewed most current lab test results and cultures  YES Reviewed most current radiology test results   YES Review and summation of old records today    NO Reviewed patient's current orders and MAR    YES 
PMH/SH reviewed - no change compared to H&P 
________________________________________________________________________ Care Plan discussed with: 
  Comments Patient x Family  xx RN Care Manager Consultant Multidiciplinary team rounds were held today with , nursing, pharmacist and clinical coordinator.   Patient's plan of care was discussed; medications were reviewed and discharge planning was addressed. ________________________________________________________________________ Total CRITICAL CARE TIME Spent:   Minutes non procedure based Comments >50% of visit spent in counseling and coordination of care    
________________________________________________________________________ Crystal Bautista MD  
 
Procedures: see electronic medical records for all procedures/Xrays and details which were not copied into this note but were reviewed prior to creation of Plan. LABS: 
I reviewed today's most current labs and imaging studies. Pertinent labs include: 
Recent Labs  
  07/01/19 
0255 06/30/19 
1236 WBC 6.8 8.7 HGB 13.0 13.8 HCT 39.5 40.0  268 Recent Labs  
  07/01/19 
0255 06/30/19 
1236  135*  
K 5.0 5.3*  
 100 CO2 30 28 * 252* BUN 18 16 CREA 1.36* 1.33* CA 8.8 9.4 MG 1.6  --   
PHOS 3.9  --   
 
 
Signed: Crystal Bautista MD 
 
 
 
 No

## 2023-06-02 NOTE — MR AVS SNAPSHOT
Visit Information Date & Time Provider Department Dept. Phone Encounter #  
 1/24/2017  2:00 PM Yudith Boykin MD [facility] at [address] [place] [address] [id number] Upcoming Health Maintenance Date Due  
 FOOT EXAM Q1 9/20/1945 EYE EXAM RETINAL OR DILATED Q1 9/20/1945 DTaP/Tdap/Td series (1 - Tdap) 9/20/1956 ZOSTER VACCINE AGE 60> 9/20/1995 MEDICARE YEARLY EXAM 9/20/2000 Pneumococcal 65+ High/Highest Risk (2 of 2 - PPSV23) 11/28/2015 HEMOGLOBIN A1C Q6M 3/14/2017 LIPID PANEL Q1 5/13/2017 MICROALBUMIN Q1 9/14/2017 GLAUCOMA SCREENING Q2Y 3/16/2018 Allergies as of 1/24/2017  Review Complete On: 9/14/2016 By: Yudith Boykin MD  
 No Known Allergies Current Immunizations  Reviewed on 5/4/2016 Name Date Influenza High Dose Vaccine PF 9/14/2016 Influenza Vaccine Whole 9/28/2010 Pneumococcal Vaccine (Unspecified Type) 11/28/2010  9:00 PM  
  
 Not reviewed this visit You Were Diagnosed With   
  
 Codes Comments IDDM (insulin dependent diabetes mellitus) ([facility]    -  Primary ICD-10-CM: E11.9, Z79.4 ICD-9-CM: 250.00, V58.67 CKD (chronic kidney disease) stage 3, GFR 30-59 ml/min     ICD-10-CM: N18.3 ICD-9-CM: 478. 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 
ICD-9-CM: 250.60, 356.2 Encounter for long-term (current) use of medications     ICD-10-CM: Z79.899 ICD-9-CM: V58.69 Vitals BP Pulse Temp Resp Height(growth percentile) Weight(growth percentile) 133/61 81 97.6 °F (36.4 °C) (Oral) 14 5' 11\" (1.803 m) 215 lb (97.5 kg) SpO2 BMI Smoking Status 94% 29.99 kg/m2 Former Smoker Vitals History BMI and BSA Data Body Mass Index Body Surface Area  
 29.99 kg/m 2 2.21 m 2 Preferred Pharmacy Pharmacy Name Phone [facility] [address], [address] [place] [address] [phone] No Your Updated Medication List  
  
   
This list is accurate as of: 1/24/17  2:29 PM.  Always use your most recent med list.  
  
  
  
  
 * albuterol 90 mcg/actuation inhaler Commonly known as:  PROVENTIL HFA, VENTOLIN HFA, PROAIR HFA Take 2 puffs by inhalation every four (4) hours as needed for Wheezing. * albuterol 1.25 mg/3 mL Nebu Commonly known as:  Sergey Medellin Take 3 mL by inhalation every four (4) hours as needed for Wheezing (wheezing). Blood-Glucose Meter monitoring kit Use as directed  
  
 bumetanide 2 mg tablet Commonly known as:  Dayan Gomez Take 1 Tab by mouth daily as needed. citalopram 20 mg tablet Commonly known as:  CELEXA  
TAKE 1 TABLET BY MOUTH EVERY MORNING AS NEEDED FOR ANXIETY DEPPRESSION  
  
 desonide 0.05 % topical ointment Commonly known as:  Emelia Apa Apply  to affected area two (2) times a day.  
  
 gabapentin 300 mg capsule Commonly known as:  NEURONTIN  
TAKE 1 CAPSULE BY MOUTH EVERY NIGHT  
  
 glipiZIDE 5 mg tablet Commonly known as:  Rumalda Ursula Take  by mouth two (2) times a day. HYDROcodone-acetaminophen 5-325 mg per tablet Commonly known as:  Peyman Handler Take 1 Tab by mouth daily as needed for Pain. Insulin Lisp & Lisp Prot (Hum) 100 unit/mL (75-25) Inpn Commonly known as:  HUMALOG 75-25 MIX 52 units in the morning, 45 units in the evening. Inulin-Sorbitol 2 gram Chew Commonly known as:  Fiber Take 2 g by mouth daily. lisinopril 20 mg tablet Commonly known as:  Nonah Dipti Take 1 Tab by mouth daily. metOLazone 5 mg tablet Commonly known as:  Jena Ripper Take 5 mg by mouth daily. MYRBETRIQ 25 mg ER tablet Generic drug:  mirabegron ER  
  
 omeprazole 20 mg capsule Commonly known as:  PRILOSEC  
TAKE ONE CAPSULE BY MOUTH EVERY DAY  
  
 rosuvastatin 40 mg tablet Commonly known as:  CRESTOR Take 1 Tab by mouth nightly. SPIRIVA WITH HANDIHALER 18 mcg inhalation capsule Generic drug:  tiotropium Take 1 Cap by inhalation daily. triamcinolone acetonide 0.025 % topical cream  
Commonly known as:  KENALOG  
  
 VESIcare 10 mg tablet Generic drug:  solifenacin ZETIA 10 mg tablet Generic drug:  ezetimibe Take 10 mg by mouth daily. * Notice: This list has 2 medication(s) that are the same as other medications prescribed for you. Read the directions carefully, and ask your doctor or other care provider to review them with you. Prescriptions Printed Refills HYDROcodone-acetaminophen (NORCO) 5-325 mg per tablet 0 Sig: Take 1 Tab by mouth daily as needed for Pain. Class: Print Route: Oral  
  
Prescriptions Sent to Pharmacy Refills Insulin Lisp & Lisp Prot, Hum, (HUMALOG 75-25 MIX) 100 unit/mL (75-25) inpn 5 Si units in the morning, 45 units in the evening. Class: Normal  
 Pharmacy: [facility] [address] #: [phone] We Performed the Following HEMOGLOBIN A1C W/O EAG [33683 CPT(R)] METABOLIC PANEL, COMPREHENSIVE [52306 CPT(R)] MICROALBUMIN, UR, RAND W/ MICROALBUMIN/CREA RATIO [id number] CPT(R)] REFERRAL TO ENDOCRINOLOGY [AOR17 Custom] Comments:  
 Please evaluate patient for uncontrolled DM2. Previous Endo was Dr. Elham Montgomery. Referral Information Referral ID Referred By Referred To  
  
 [id number] Zelalem Chun MD   
   [address] [place] [address] [address] Phone: [phone] Fax: [phone] Visits Status Start Date End Date 1 New Request 17 If your referral has a status of pending review or denied, additional information will be sent to support the outcome of this decision. Introducing [facility]!    
 Joey Khan introduces [employer] patient portal. Now you can access parts of your medical record, email your doctor's office, and request medication refills online. 1. In your internet browser, go to [email or web address] 2. Click on the First Time User? Click Here link in the Sign In box. You will see the New Member Sign Up page. 3. Enter your KeyView Access Code exactly as it appears below. You will not need to use this code after youve completed the sign-up process. If you do not sign up before the expiration date, you must request a new code. · KeyView Access Code: [email or web address] Expires: 4/24/2017  2:29 PM 
 
4. Enter the last four digits of your Social Security Number (xxxx) and Date of Birth (mm/dd/yyyy) as indicated and click Submit. You will be taken to the next sign-up page. 5. Create a KeyView ID. This will be your KeyView login ID and cannot be changed, so think of one that is secure and easy to remember. 6. Create a KeyView password. You can change your password at any time. 7. Enter your Password Reset Question and Answer. This can be used at a later time if you forget your password. 8. Enter your e-mail address. You will receive e-mail notification when new information is available in [address] 9. Click Sign Up. You can now view and download portions of your medical record. 10. Click the Download Summary menu link to download a portable copy of your medical information. If you have questions, please visit the Frequently Asked Questions section of the KeyView website. Remember, KeyView is NOT to be used for urgent needs. For medical emergencies, dial 911. Now available from your iPhone and Android! Please provide this summary of care documentation to your next provider. Your primary care clinician is listed as Chato Pandya. If you have any questions after today's visit, please call [phone].